# Patient Record
Sex: FEMALE | Race: OTHER | HISPANIC OR LATINO
[De-identification: names, ages, dates, MRNs, and addresses within clinical notes are randomized per-mention and may not be internally consistent; named-entity substitution may affect disease eponyms.]

---

## 2017-06-26 ENCOUNTER — APPOINTMENT (OUTPATIENT)
Dept: MRI IMAGING | Facility: CLINIC | Age: 27
End: 2017-06-26

## 2017-06-26 ENCOUNTER — OUTPATIENT (OUTPATIENT)
Dept: OUTPATIENT SERVICES | Facility: HOSPITAL | Age: 27
LOS: 1 days | End: 2017-06-26
Payer: MEDICAID

## 2017-06-26 DIAGNOSIS — Z00.8 ENCOUNTER FOR OTHER GENERAL EXAMINATION: ICD-10-CM

## 2017-06-26 PROCEDURE — 70551 MRI BRAIN STEM W/O DYE: CPT

## 2018-05-15 ENCOUNTER — RESULT REVIEW (OUTPATIENT)
Age: 28
End: 2018-05-15

## 2018-09-20 ENCOUNTER — APPOINTMENT (OUTPATIENT)
Dept: RHEUMATOLOGY | Facility: CLINIC | Age: 28
End: 2018-09-20
Payer: MEDICAID

## 2018-09-20 VITALS
HEIGHT: 59 IN | SYSTOLIC BLOOD PRESSURE: 120 MMHG | OXYGEN SATURATION: 98 % | TEMPERATURE: 97.8 F | BODY MASS INDEX: 26.21 KG/M2 | HEART RATE: 64 BPM | WEIGHT: 130 LBS | DIASTOLIC BLOOD PRESSURE: 72 MMHG | RESPIRATION RATE: 14 BRPM

## 2018-09-20 DIAGNOSIS — G43.909 MIGRAINE, UNSPECIFIED, NOT INTRACTABLE, W/OUT STATUS MIGRAINOSUS: ICD-10-CM

## 2018-09-20 DIAGNOSIS — Z83.511 FAMILY HISTORY OF GLAUCOMA: ICD-10-CM

## 2018-09-20 PROCEDURE — 99204 OFFICE O/P NEW MOD 45 MIN: CPT

## 2018-09-23 RX ORDER — IBUPROFEN 600 MG/1
600 TABLET, FILM COATED ORAL
Qty: 15 | Refills: 0 | Status: DISCONTINUED | COMMUNITY
Start: 2018-07-27 | End: 2018-09-23

## 2018-09-23 RX ORDER — PREDNISONE 20 MG/1
20 TABLET ORAL
Qty: 8 | Refills: 0 | Status: DISCONTINUED | COMMUNITY
Start: 2018-09-18 | End: 2018-09-23

## 2018-09-23 RX ORDER — CEPHALEXIN 500 MG/1
500 CAPSULE ORAL
Qty: 28 | Refills: 0 | Status: DISCONTINUED | COMMUNITY
Start: 2018-07-12 | End: 2018-09-23

## 2018-09-23 RX ORDER — AMOXICILLIN 500 MG/1
500 CAPSULE ORAL
Qty: 21 | Refills: 0 | Status: DISCONTINUED | COMMUNITY
Start: 2018-07-27 | End: 2018-09-23

## 2018-09-23 RX ORDER — METHYLPREDNISOLONE 4 MG/1
4 TABLET ORAL
Qty: 21 | Refills: 0 | Status: DISCONTINUED | COMMUNITY
Start: 2018-07-23 | End: 2018-09-23

## 2018-09-23 RX ORDER — CLOTRIMAZOLE 10 MG/G
1 CREAM TOPICAL
Qty: 30 | Refills: 0 | Status: DISCONTINUED | COMMUNITY
Start: 2018-07-23 | End: 2018-09-23

## 2018-09-25 LAB
ANA PAT FLD IF-IMP: ABNORMAL
ANA SER IF-ACNC: ABNORMAL
C3 SERPL-MCNC: 64 MG/DL
C4 SERPL-MCNC: 6 MG/DL
CREAT SPEC-SCNC: 104 MG/DL
CREAT/PROT UR: 0.1 RATIO
DSDNA AB SER-ACNC: 306 IU/ML
PROT UR-MCNC: 13 MG/DL

## 2018-10-25 ENCOUNTER — APPOINTMENT (OUTPATIENT)
Dept: RHEUMATOLOGY | Facility: CLINIC | Age: 28
End: 2018-10-25
Payer: MEDICAID

## 2018-10-25 VITALS
DIASTOLIC BLOOD PRESSURE: 72 MMHG | WEIGHT: 137 LBS | HEIGHT: 59 IN | OXYGEN SATURATION: 98 % | HEART RATE: 87 BPM | BODY MASS INDEX: 27.62 KG/M2 | TEMPERATURE: 97.8 F | SYSTOLIC BLOOD PRESSURE: 106 MMHG

## 2018-10-25 LAB
BASOPHILS # BLD AUTO: 0.02 K/UL
BASOPHILS NFR BLD AUTO: 0.2 %
EOSINOPHIL # BLD AUTO: 0.01 K/UL
EOSINOPHIL NFR BLD AUTO: 0.1 %
ERYTHROCYTE [SEDIMENTATION RATE] IN BLOOD BY WESTERGREN METHOD: 7 MM/HR
HCT VFR BLD CALC: 37.1 %
HGB BLD-MCNC: 13.4 G/DL
IMM GRANULOCYTES NFR BLD AUTO: 0.1 %
LYMPHOCYTES # BLD AUTO: 0.92 K/UL
LYMPHOCYTES NFR BLD AUTO: 11.2 %
MAN DIFF?: NORMAL
MCHC RBC-ENTMCNC: 29.8 PG
MCHC RBC-ENTMCNC: 36.1 GM/DL
MCV RBC AUTO: 82.4 FL
MONOCYTES # BLD AUTO: 0.25 K/UL
MONOCYTES NFR BLD AUTO: 3.1 %
NEUTROPHILS # BLD AUTO: 6.97 K/UL
NEUTROPHILS NFR BLD AUTO: 85.3 %
PLATELET # BLD AUTO: 340 K/UL
RBC # BLD: 4.5 M/UL
RBC # FLD: 13.1 %
WBC # FLD AUTO: 8.18 K/UL

## 2018-10-25 PROCEDURE — 99214 OFFICE O/P EST MOD 30 MIN: CPT

## 2018-10-25 RX ORDER — PREDNISONE 20 MG/1
20 TABLET ORAL
Qty: 60 | Refills: 1 | Status: DISCONTINUED | COMMUNITY
Start: 2018-09-20 | End: 2018-10-25

## 2018-10-25 RX ORDER — NAPROXEN 500 MG/1
500 TABLET ORAL
Qty: 20 | Refills: 0 | Status: DISCONTINUED | COMMUNITY
Start: 2018-09-18 | End: 2018-10-25

## 2018-10-26 LAB
ALBUMIN SERPL ELPH-MCNC: 4.7 G/DL
ALP BLD-CCNC: 56 U/L
ALT SERPL-CCNC: 13 U/L
ANION GAP SERPL CALC-SCNC: 14 MMOL/L
AST SERPL-CCNC: 15 U/L
BILIRUB SERPL-MCNC: 0.3 MG/DL
BUN SERPL-MCNC: 9 MG/DL
C3 SERPL-MCNC: 71 MG/DL
C4 SERPL-MCNC: 9 MG/DL
CALCIUM SERPL-MCNC: 9.1 MG/DL
CHLORIDE SERPL-SCNC: 103 MMOL/L
CO2 SERPL-SCNC: 27 MMOL/L
CREAT SERPL-MCNC: 0.68 MG/DL
CRP SERPL-MCNC: <0.1 MG/DL
DSDNA AB SER-ACNC: 119 IU/ML
GLUCOSE SERPL-MCNC: 92 MG/DL
POTASSIUM SERPL-SCNC: 4.3 MMOL/L
PROT SERPL-MCNC: 7.7 G/DL
SODIUM SERPL-SCNC: 144 MMOL/L

## 2018-10-28 LAB
ANA PAT FLD IF-IMP: ABNORMAL
ANA SER IF-ACNC: ABNORMAL

## 2018-11-07 ENCOUNTER — FORM ENCOUNTER (OUTPATIENT)
Age: 28
End: 2018-11-07

## 2018-11-08 ENCOUNTER — APPOINTMENT (OUTPATIENT)
Dept: ULTRASOUND IMAGING | Facility: HOSPITAL | Age: 28
End: 2018-11-08

## 2018-11-08 ENCOUNTER — OUTPATIENT (OUTPATIENT)
Dept: OUTPATIENT SERVICES | Facility: HOSPITAL | Age: 28
LOS: 1 days | End: 2018-11-08
Payer: MEDICAID

## 2018-11-08 DIAGNOSIS — I77.6 ARTERITIS, UNSPECIFIED: ICD-10-CM

## 2018-11-08 PROCEDURE — 93930 UPPER EXTREMITY STUDY: CPT | Mod: 26

## 2018-11-08 PROCEDURE — 93970 EXTREMITY STUDY: CPT

## 2018-11-08 PROCEDURE — 93970 EXTREMITY STUDY: CPT | Mod: 26

## 2018-11-08 PROCEDURE — 93930 UPPER EXTREMITY STUDY: CPT

## 2018-11-09 ENCOUNTER — INPATIENT (INPATIENT)
Facility: HOSPITAL | Age: 28
LOS: 4 days | Discharge: HOME | DRG: 300 | End: 2018-11-14
Attending: HOSPITALIST | Admitting: INTERNAL MEDICINE
Payer: MEDICAID

## 2018-11-09 ENCOUNTER — RX RENEWAL (OUTPATIENT)
Age: 28
End: 2018-11-09

## 2018-11-09 VITALS
HEART RATE: 91 BPM | OXYGEN SATURATION: 98 % | WEIGHT: 134.92 LBS | RESPIRATION RATE: 18 BRPM | SYSTOLIC BLOOD PRESSURE: 109 MMHG | HEIGHT: 59 IN | DIASTOLIC BLOOD PRESSURE: 76 MMHG | TEMPERATURE: 98 F

## 2018-11-09 LAB
ALBUMIN SERPL ELPH-MCNC: 4.6 G/DL — SIGNIFICANT CHANGE UP (ref 3.3–5)
ALP SERPL-CCNC: 60 U/L — SIGNIFICANT CHANGE UP (ref 40–120)
ALT FLD-CCNC: 12 U/L — SIGNIFICANT CHANGE UP (ref 10–45)
ANION GAP SERPL CALC-SCNC: 11 MMOL/L — SIGNIFICANT CHANGE UP (ref 5–17)
APTT BLD: 67.2 SEC — HIGH (ref 27.5–36.3)
AST SERPL-CCNC: 18 U/L — SIGNIFICANT CHANGE UP (ref 10–40)
BASOPHILS # BLD AUTO: 0.1 K/UL — SIGNIFICANT CHANGE UP (ref 0–0.2)
BASOPHILS NFR BLD AUTO: 0.8 % — SIGNIFICANT CHANGE UP (ref 0–2)
BILIRUB SERPL-MCNC: 0.4 MG/DL — SIGNIFICANT CHANGE UP (ref 0.2–1.2)
BLD GP AB SCN SERPL QL: NEGATIVE — SIGNIFICANT CHANGE UP
BUN SERPL-MCNC: 8 MG/DL — SIGNIFICANT CHANGE UP (ref 7–23)
CALCIUM SERPL-MCNC: 9.3 MG/DL — SIGNIFICANT CHANGE UP (ref 8.4–10.5)
CHLORIDE SERPL-SCNC: 100 MMOL/L — SIGNIFICANT CHANGE UP (ref 96–108)
CO2 SERPL-SCNC: 25 MMOL/L — SIGNIFICANT CHANGE UP (ref 22–31)
CREAT SERPL-MCNC: 0.65 MG/DL — SIGNIFICANT CHANGE UP (ref 0.5–1.3)
EOSINOPHIL # BLD AUTO: 0.1 K/UL — SIGNIFICANT CHANGE UP (ref 0–0.5)
EOSINOPHIL NFR BLD AUTO: 0.9 % — SIGNIFICANT CHANGE UP (ref 0–6)
GLUCOSE SERPL-MCNC: 87 MG/DL — SIGNIFICANT CHANGE UP (ref 70–99)
HCT VFR BLD CALC: 40.3 % — SIGNIFICANT CHANGE UP (ref 34.5–45)
HGB BLD-MCNC: 14.1 G/DL — SIGNIFICANT CHANGE UP (ref 11.5–15.5)
INR BLD: 1.2 RATIO — HIGH (ref 0.88–1.16)
LYMPHOCYTES # BLD AUTO: 2.1 K/UL — SIGNIFICANT CHANGE UP (ref 1–3.3)
LYMPHOCYTES # BLD AUTO: 22.8 % — SIGNIFICANT CHANGE UP (ref 13–44)
MCHC RBC-ENTMCNC: 29.8 PG — SIGNIFICANT CHANGE UP (ref 27–34)
MCHC RBC-ENTMCNC: 34.9 GM/DL — SIGNIFICANT CHANGE UP (ref 32–36)
MCV RBC AUTO: 85.3 FL — SIGNIFICANT CHANGE UP (ref 80–100)
MONOCYTES # BLD AUTO: 0.6 K/UL — SIGNIFICANT CHANGE UP (ref 0–0.9)
MONOCYTES NFR BLD AUTO: 6.5 % — SIGNIFICANT CHANGE UP (ref 2–14)
NEUTROPHILS # BLD AUTO: 6.4 K/UL — SIGNIFICANT CHANGE UP (ref 1.8–7.4)
NEUTROPHILS NFR BLD AUTO: 69.1 % — SIGNIFICANT CHANGE UP (ref 43–77)
PLATELET # BLD AUTO: 355 K/UL — SIGNIFICANT CHANGE UP (ref 150–400)
POTASSIUM SERPL-MCNC: 3.8 MMOL/L — SIGNIFICANT CHANGE UP (ref 3.5–5.3)
POTASSIUM SERPL-SCNC: 3.8 MMOL/L — SIGNIFICANT CHANGE UP (ref 3.5–5.3)
PROT SERPL-MCNC: 8.1 G/DL — SIGNIFICANT CHANGE UP (ref 6–8.3)
PROTHROM AB SERPL-ACNC: 13.8 SEC — HIGH (ref 10–12.9)
RBC # BLD: 4.73 M/UL — SIGNIFICANT CHANGE UP (ref 3.8–5.2)
RBC # FLD: 12.6 % — SIGNIFICANT CHANGE UP (ref 10.3–14.5)
RH IG SCN BLD-IMP: POSITIVE — SIGNIFICANT CHANGE UP
SODIUM SERPL-SCNC: 136 MMOL/L — SIGNIFICANT CHANGE UP (ref 135–145)
WBC # BLD: 9.2 K/UL — SIGNIFICANT CHANGE UP (ref 3.8–10.5)
WBC # FLD AUTO: 9.2 K/UL — SIGNIFICANT CHANGE UP (ref 3.8–10.5)

## 2018-11-09 PROCEDURE — 99285 EMERGENCY DEPT VISIT HI MDM: CPT

## 2018-11-09 NOTE — ED PROVIDER NOTE - NS ED ROS FT
See HPI    [ x] All other systems negative  [ ] Unable to assess ROS because ________. paresthesias, Otherwise negative except as per HPI.

## 2018-11-09 NOTE — ED PROVIDER NOTE - PHYSICAL EXAMINATION
General: WN/WD NAD  HEENT: PERRLA, EOMI, moist mucous membranes  Neurology: A&Ox3, nonfocal, AMADO x 4  Respiratory: CTA B/L, normal respiratory effort, no wheezes, crackles, rales  CV: RRR, S1S2, no murmurs, rubs or gallops  Abdominal: Soft, NT, ND +BS, Last BM  Extremities: No edema, + peripheral pulses  Incisions: None  Tubes: None General: WN/WD NAD  HEENT: PERRLA, EOMI, moist mucous membranes  Neurology: A&Ox3, nonfocal, AMADO x 4  Respiratory: CTA B/L, normal respiratory effort, no wheezes, crackles, rales  CV: RRR, S1S2, no murmurs, rubs or gallops  Abdominal: Soft, NT, ND +BS, Last BM  Extremities: No edema, + peripheral pulses, LUE -- L hand c palp radial pulse, symmetric, normal sensation throughout M/R/U, strength 5/5 throughout, no swelling of hand, CR <2 sec distally.  Incisions: None  Tubes: None

## 2018-11-09 NOTE — CONSULT NOTE ADULT - ASSESSMENT
Patient is a 28F with lupus found to have occlusion of right ulnar artery, blood flow to hand intact.  - no acute surgical intervention at this time  - patient can go home on therapeutic AC  - recommend hypercoagulability workup with heme/rheum  - patient seen and examined with fellow Dr. Danay Blunt PGY2  [phone] Patient is a 28F with lupus found to have occlusion of right ulnar artery, blood flow to hand intact.  - no acute surgical intervention at this time  - patient can go home on therapeutic AC, may use warfarin or NOAC as preferred by patient and/or primary team, depending on pricing/availability  - recommend hypercoagulability workup with heme/rheum  - not unreasonable to obtain echo to rule out source of thromboembolism  - patient seen and examined with fellow Dr. Danay Blunt PGY2  [id number]

## 2018-11-09 NOTE — ED PROVIDER NOTE - OBJECTIVE STATEMENT
28F w/ hx of lupus (on plaquinil and prednisone) and migraines presents sent in by rheumatologist after RUE ultrasound showed R ulnar arterial clot in the distal forearm. Pt saw rheum for RUE heaviness and pain. Obtained bilat venous and arterial sono, which showed findings above. No HA, blurry vision, SOB, nausea, abdominal pain, lower extremity paresthesias, fevers, chills. Endorses some mild R chest heaviness in association with the RUE heaviness. Can still move R hand and wrist. Doesn't feel cold. No hx of arterial clots in the past. 2 successful pregnancies, no abortions. No fam hx of early cardiac death. Non smoker (occ smokes marijuana, last use last week). Denies IVDU. No OCPs. LMP one month ago.

## 2018-11-09 NOTE — CONSULT NOTE ADULT - ATTENDING COMMENTS
seen ex;ed dw;ed fellow  As above  R ulnar occ.  No ischia  +rad puklse.  Ulnar pulse nonpalp.  Hand/fingers ok    Rec:   Med/rheum/heme pete and mgmt  Agree w AC

## 2018-11-09 NOTE — ED ADULT TRIAGE NOTE - CHIEF COMPLAINT QUOTE
Right arm weakness for a while, had ultrasound of right arm yesterday, was called today to go to ER for "arterial blockage". Had chest pressure today. Diagnosed with Lupus 3 months ago, on Prednisone and Plaquenil

## 2018-11-09 NOTE — ED ADULT NURSE REASSESSMENT NOTE - NS ED NURSE REASSESS COMMENT FT1
23:10. Report received from NEHA Martin. Pt AAOx4, NAD, resp nonlabored, skin warm/dry, resting comfortably in bed with family at bedside. Pt denies headache, dizziness, chest pain, palpitations, SOB, abd pain, n/v/d, urinary symptoms, fevers, chills, weakness at this time. Safety maintained.

## 2018-11-09 NOTE — CONSULT NOTE ADULT - SUBJECTIVE AND OBJECTIVE BOX
CC: 28y old Female admitted with a chief complaint of arm weakness , now    HPI: Patient is a 28F hx of lupus diagnosed 3 months ago sent in to the ED by her rheumatologist for evaluation of right ulnar artery occlusion.  Patient has had intermittent weakness, pain, and warmth in her right arm for the past 2 months.  As part of workup her rheumatologist sent her for OP dopplers, was found to have occlusion of rt ulnar artery and was sent into ED.  She is currently asymptomatic, no pain, no numbness/tingling, no weakness, no pressure. She is able to use her arm without difficulty.  No fevers, chills, n/v.  No hx of blood clots in past.      PMHx: Migraines  Lupus erythematosus, unspecified form    PSHx: No significant past surgical history      Allergies: No Known Allergies    Family Hx: No pertinent family history in first degree relatives      Physical Exam  T(C): 36.8  HR: 91 (91 - 91)  BP: 109/76 (109/76 - 109/76)  RR: 18 (18 - 18)  SpO2: 98% (98% - 98%)  Tmax: T(C): , Max: 36.8 (11-09-18 @ 18:39)    General: well developed, well nourished, NAD  Neuro: alert and oriented, no focal deficits, moves all extremities spontaneously  HEENT: NCAT, EOMI, anicteric, mucosa moist  Respiratory: airway patent, respirations unlabored  CVS: regular rate and rhythm  Abdomen: soft, nontender, nondistended  Extremities: no edema, sensation and movement grossly intact. Right radial and ulnar pulses intact, hand warm, good cap refill.   Skin: warm, dry, appropriate color    Labs:                        14.1   9.2   )-----------( 355      ( 09 Nov 2018 20:45 )             40.3     PT/INR - ( 09 Nov 2018 20:45 )   PT: 13.8 sec;   INR: 1.20 ratio         PTT - ( 09 Nov 2018 20:45 )  PTT:67.2 sec  11-09    136  |  100  |  8   ----------------------------<  87  3.8   |  25  |  0.65    Ca    9.3      09 Nov 2018 20:45    TPro  8.1  /  Alb  4.6  /  TBili  0.4  /  DBili  x   /  AST  18  /  ALT  12  /  AlkPhos  60  11-09            Imaging and other studies:  < from: [facility] Duplex Upper Extrem Arterial, Bilat (11.08.18 @ 10:51) >  Impression:    Near-total occlusion of the mid to distal right ulnar artery .    < end of copied text >

## 2018-11-10 ENCOUNTER — TRANSCRIPTION ENCOUNTER (OUTPATIENT)
Age: 28
End: 2018-11-10

## 2018-11-10 DIAGNOSIS — I74.2 EMBOLISM AND THROMBOSIS OF ARTERIES OF THE UPPER EXTREMITIES: ICD-10-CM

## 2018-11-10 DIAGNOSIS — M32.9 SYSTEMIC LUPUS ERYTHEMATOSUS, UNSPECIFIED: ICD-10-CM

## 2018-11-10 DIAGNOSIS — Z29.9 ENCOUNTER FOR PROPHYLACTIC MEASURES, UNSPECIFIED: ICD-10-CM

## 2018-11-10 LAB
APPEARANCE UR: CLEAR — SIGNIFICANT CHANGE UP
APTT BLD: 118.3 SEC — HIGH (ref 27.5–36.3)
APTT BLD: 75.4 SEC — HIGH (ref 27.5–36.3)
BACTERIA # UR AUTO: ABNORMAL
BILIRUB UR-MCNC: NEGATIVE — SIGNIFICANT CHANGE UP
COLOR SPEC: YELLOW — SIGNIFICANT CHANGE UP
CREAT ?TM UR-MCNC: 74 MG/DL — SIGNIFICANT CHANGE UP
DIFF PNL FLD: NEGATIVE — SIGNIFICANT CHANGE UP
EPI CELLS # UR: 4 /HPF — SIGNIFICANT CHANGE UP
GLUCOSE UR QL: NEGATIVE — SIGNIFICANT CHANGE UP
HCT VFR BLD CALC: 37.7 % — SIGNIFICANT CHANGE UP (ref 34.5–45)
HCT VFR BLD CALC: 40.5 % — SIGNIFICANT CHANGE UP (ref 34.5–45)
HGB BLD-MCNC: 13.7 G/DL — SIGNIFICANT CHANGE UP (ref 11.5–15.5)
HGB BLD-MCNC: 13.7 G/DL — SIGNIFICANT CHANGE UP (ref 11.5–15.5)
HYALINE CASTS # UR AUTO: 1 /LPF — SIGNIFICANT CHANGE UP (ref 0–2)
INR BLD: 1.31 RATIO — HIGH (ref 0.88–1.16)
KETONES UR-MCNC: NEGATIVE — SIGNIFICANT CHANGE UP
LEUKOCYTE ESTERASE UR-ACNC: ABNORMAL
MCHC RBC-ENTMCNC: 28.8 PG — SIGNIFICANT CHANGE UP (ref 27–34)
MCHC RBC-ENTMCNC: 30.8 PG — SIGNIFICANT CHANGE UP (ref 27–34)
MCHC RBC-ENTMCNC: 33.9 GM/DL — SIGNIFICANT CHANGE UP (ref 32–36)
MCHC RBC-ENTMCNC: 36.4 GM/DL — HIGH (ref 32–36)
MCV RBC AUTO: 84.7 FL — SIGNIFICANT CHANGE UP (ref 80–100)
MCV RBC AUTO: 85.2 FL — SIGNIFICANT CHANGE UP (ref 80–100)
NITRITE UR-MCNC: NEGATIVE — SIGNIFICANT CHANGE UP
PH UR: 7 — SIGNIFICANT CHANGE UP (ref 5–8)
PLATELET # BLD AUTO: 318 K/UL — SIGNIFICANT CHANGE UP (ref 150–400)
PLATELET # BLD AUTO: 336 K/UL — SIGNIFICANT CHANGE UP (ref 150–400)
PROT ?TM UR-MCNC: 10 MG/DL — SIGNIFICANT CHANGE UP (ref 0–12)
PROT UR-MCNC: ABNORMAL
PROT/CREAT UR-RTO: 0.1 RATIO — SIGNIFICANT CHANGE UP (ref 0–0.2)
PROTHROM AB SERPL-ACNC: 15.2 SEC — HIGH (ref 10–12.9)
RBC # BLD: 4.44 M/UL — SIGNIFICANT CHANGE UP (ref 3.8–5.2)
RBC # BLD: 4.76 M/UL — SIGNIFICANT CHANGE UP (ref 3.8–5.2)
RBC # FLD: 12.4 % — SIGNIFICANT CHANGE UP (ref 10.3–14.5)
RBC # FLD: 12.4 % — SIGNIFICANT CHANGE UP (ref 10.3–14.5)
RBC CASTS # UR COMP ASSIST: 1 /HPF — SIGNIFICANT CHANGE UP (ref 0–4)
SP GR SPEC: 1.01 — SIGNIFICANT CHANGE UP (ref 1.01–1.02)
UROBILINOGEN FLD QL: NEGATIVE — SIGNIFICANT CHANGE UP
WBC # BLD: 12.1 K/UL — HIGH (ref 3.8–10.5)
WBC # BLD: 9.4 K/UL — SIGNIFICANT CHANGE UP (ref 3.8–10.5)
WBC # FLD AUTO: 12.1 K/UL — HIGH (ref 3.8–10.5)
WBC # FLD AUTO: 9.4 K/UL — SIGNIFICANT CHANGE UP (ref 3.8–10.5)
WBC UR QL: 10 /HPF — HIGH (ref 0–5)

## 2018-11-10 PROCEDURE — 99223 1ST HOSP IP/OBS HIGH 75: CPT | Mod: GC

## 2018-11-10 PROCEDURE — 12345: CPT | Mod: NC,GC

## 2018-11-10 RX ORDER — FONDAPARINUX SODIUM 2.5 MG/.5ML
1 INJECTION, SOLUTION SUBCUTANEOUS
Qty: 60 | Refills: 0 | OUTPATIENT
Start: 2018-11-10 | End: 2018-12-09

## 2018-11-10 RX ORDER — WARFARIN SODIUM 2.5 MG/1
1 TABLET ORAL
Qty: 30 | Refills: 0 | OUTPATIENT
Start: 2018-11-10 | End: 2018-12-09

## 2018-11-10 RX ORDER — ENOXAPARIN SODIUM 100 MG/ML
50 INJECTION SUBCUTANEOUS
Qty: 1500 | Refills: 0 | OUTPATIENT
Start: 2018-11-10 | End: 2018-12-09

## 2018-11-10 RX ORDER — HYDROXYCHLOROQUINE SULFATE 200 MG
1 TABLET ORAL
Qty: 0 | Refills: 0 | COMMUNITY

## 2018-11-10 RX ORDER — WARFARIN SODIUM 2.5 MG/1
5 TABLET ORAL ONCE
Qty: 0 | Refills: 0 | Status: COMPLETED | OUTPATIENT
Start: 2018-11-10 | End: 2018-11-10

## 2018-11-10 RX ORDER — HEPARIN SODIUM 5000 [USP'U]/ML
2500 INJECTION INTRAVENOUS; SUBCUTANEOUS EVERY 6 HOURS
Qty: 0 | Refills: 0 | Status: DISCONTINUED | OUTPATIENT
Start: 2018-11-10 | End: 2018-11-14

## 2018-11-10 RX ORDER — HEPARIN SODIUM 5000 [USP'U]/ML
5000 INJECTION INTRAVENOUS; SUBCUTANEOUS EVERY 6 HOURS
Qty: 0 | Refills: 0 | Status: DISCONTINUED | OUTPATIENT
Start: 2018-11-10 | End: 2018-11-14

## 2018-11-10 RX ORDER — HEPARIN SODIUM 5000 [USP'U]/ML
INJECTION INTRAVENOUS; SUBCUTANEOUS
Qty: 25000 | Refills: 0 | Status: DISCONTINUED | OUTPATIENT
Start: 2018-11-10 | End: 2018-11-10

## 2018-11-10 RX ORDER — HEPARIN SODIUM 5000 [USP'U]/ML
INJECTION INTRAVENOUS; SUBCUTANEOUS
Qty: 25000 | Refills: 0 | Status: DISCONTINUED | OUTPATIENT
Start: 2018-11-10 | End: 2018-11-14

## 2018-11-10 RX ORDER — ASPIRIN/CALCIUM CARB/MAGNESIUM 324 MG
81 TABLET ORAL DAILY
Qty: 0 | Refills: 0 | Status: DISCONTINUED | OUTPATIENT
Start: 2018-11-10 | End: 2018-11-14

## 2018-11-10 RX ORDER — HYDROXYCHLOROQUINE SULFATE 200 MG
200 TABLET ORAL
Qty: 0 | Refills: 0 | Status: DISCONTINUED | OUTPATIENT
Start: 2018-11-10 | End: 2018-11-14

## 2018-11-10 RX ORDER — APIXABAN 2.5 MG/1
2 TABLET, FILM COATED ORAL
Qty: 120 | Refills: 0 | OUTPATIENT
Start: 2018-11-10 | End: 2018-12-09

## 2018-11-10 RX ADMIN — Medication 200 MILLIGRAM(S): at 17:51

## 2018-11-10 RX ADMIN — HEPARIN SODIUM 1100 UNIT(S)/HR: 5000 INJECTION INTRAVENOUS; SUBCUTANEOUS at 14:03

## 2018-11-10 RX ADMIN — HEPARIN SODIUM 1100 UNIT(S)/HR: 5000 INJECTION INTRAVENOUS; SUBCUTANEOUS at 01:37

## 2018-11-10 RX ADMIN — Medication 200 MILLIGRAM(S): at 07:48

## 2018-11-10 RX ADMIN — WARFARIN SODIUM 5 MILLIGRAM(S): 2.5 TABLET ORAL at 22:26

## 2018-11-10 RX ADMIN — Medication 81 MILLIGRAM(S): at 11:55

## 2018-11-10 RX ADMIN — WARFARIN SODIUM 5 MILLIGRAM(S): 2.5 TABLET ORAL at 01:38

## 2018-11-10 RX ADMIN — Medication 5 MILLIGRAM(S): at 07:18

## 2018-11-10 RX ADMIN — HEPARIN SODIUM 1000 UNIT(S)/HR: 5000 INJECTION INTRAVENOUS; SUBCUTANEOUS at 21:49

## 2018-11-10 NOTE — H&P ADULT - PROBLEM SELECTOR PLAN 1
- Evaluated by Vascular surgery- No surgical intervention at this time  - Blood flow intact to hand  - Hypercoaguable work up outpt by Rheum/Heme  - c/w Hep gtt and d/c on therapeutic a/c - Evaluated by Vascular surgery- No surgical intervention at this time  - Blood flow intact to hand. Palpable pulses. c/w ASA  - Hypercoaguable work up outpt by Rheum/Heme  - c/w Hep gtt and d/c on therapeutic a/c - Evaluated by Vascular surgery- No surgical intervention at this time  - f/u vascular recs. likely no need for TTE.  - right hand perfusing well.   - will r/o APLS for now. otherwise f/u outpt Rheum/Heme for hypercoag workup  - c/w ASA  - c/w Hep gtt and d/c on therapeutic a/c  - likely avoid coumadin given risk of birth defects. confirm insurance coverage. - Evaluated by Vascular surgery- No surgical intervention at this time  - f/u vascular recs.  - spoke with vasc surgery, they will f/u with further recs. likely no need for TTE.  - right hand perfusing well.   - will r/o APLS for now. otherwise f/u outpt Rheum/Heme for hypercoag workup  - c/w ASA  - c/w Hep gtt and d/c on therapeutic a/c  - consider avoiding coumadin given risk of birth defects. confirm insurance coverage.

## 2018-11-10 NOTE — PROGRESS NOTE ADULT - PROBLEM SELECTOR PLAN 1
- Evaluated by Vascular surgery- No surgical intervention at this time  - f/u vascular recs.  - spoke with vasc surgery, they will f/u with further recs. likely no need for TTE.  - right hand perfusing well.   - will r/o APLS for now. otherwise f/u outpt Rheum/Heme for hypercoag workup  - c/w ASA  - c/w Hep gtt and d/c on therapeutic a/c  - consider avoiding coumadin given risk of birth defects. confirm insurance coverage. - Evaluated by Vascular surgery- No surgical intervention at this time  - f/u vascular recs.  - right hand perfusing well.   - will r/o APLS for now. otherwise f/u outpt Rheum/Heme for hypercoag workup  - will order echo to rule out cardiac thrombi  - c/w ASA  - c/w Hep gtt and d/c on therapeutic a/c  - consider avoiding coumadin given risk of birth defects. confirm insurance coverage. - Evaluated by Vascular surgery- No surgical intervention at this time  - f/u vascular recs.  - right hand perfusing well.   - will r/o APLS for now. otherwise f/u outpt Rheum/Heme for hypercoag workup  - if APLS negative, can consider echo for cardiac thrombi  - c/w ASA  - c/w Hep gtt and d/c on therapeutic a/c  - consider avoiding coumadin given risk of birth defects. confirm insurance coverage.

## 2018-11-10 NOTE — PROGRESS NOTE ADULT - SUBJECTIVE AND OBJECTIVE BOX
Azam Segovia  [id number]    CHIEF COMPLAINT: right arm pain    Interval Events:    No pain or numbness in her right arm.  No chest pain, shortness of breath.    REVIEW OF SYSTEMS:    CONSTITUTIONAL: No weakness, fevers or chills  EYES/ENT: No visual changes;  No vertigo or throat pain   NECK: No pain or stiffness  RESPIRATORY: No cough, wheezing, hemoptysis; No shortness of breath  CARDIOVASCULAR: No chest pain or palpitations  GASTROINTESTINAL: No abdominal or epigastric pain. No nausea, vomiting, or hematemesis; No diarrhea or constipation. No melena or hematochezia.  GENITOURINARY: No dysuria, frequency or hematuria  NEUROLOGICAL: No numbness or weakness  SKIN: No itching, rashes      OBJECTIVE:  ICU Vital Signs Last 24 Hrs  T(C): 36.7 (10 Nov 2018 04:59), Max: 36.9 (10 Nov 2018 03:11)  T(F): 98.1 (10 Nov 2018 04:59), Max: 98.5 (10 Nov 2018 03:11)  HR: 68 (10 Nov 2018 04:59) (68 - 91)  BP: 100/69 (10 Nov 2018 04:59) (100/69 - 109/76)  BP(mean): --  ABP: --  ABP(mean): --  RR: 16 (10 Nov 2018 04:59) (16 - 18)  SpO2: 100% (10 Nov 2018 04:59) (98% - 100%)        CAPILLARY BLOOD GLUCOSE    PHYSICAL EXAM:  GENERAL: NAD, well-developed  HEAD:  Atraumatic, Normocephalic  EYES: EOMI, PERRLA, conjunctiva and sclera clear  NECK: Supple, No JVD  CHEST/LUNG: Clear to auscultation bilaterally; No wheeze  HEART: Regular rate and rhythm; No murmurs, rubs, or gallops  ABDOMEN: Soft, Nontender, Nondistended; Bowel sounds present  EXTREMITIES:  2+ radial pulses b/l, lower extremities warm  PSYCH: AAOx3  NEUROLOGY: non-focal  SKIN: No rashes or lesions    LINES:    HOSPITAL MEDICATIONS:  aspirin  chewable 81 milliGRAM(s) Oral daily  heparin  Infusion.  Unit(s)/Hr IV Continuous <Continuous>    hydroxychloroquine 200 milliGRAM(s) Oral two times a day      predniSONE   Tablet 5 milliGRAM(s) Oral daily                        LABS:                        14.1   9.2   )-----------( 355      ( 09 Nov 2018 20:45 )             40.3     Hgb Trend: 14.1<--  11-09    136  |  100  |  8   ----------------------------<  87  3.8   |  25  |  0.65    Ca    9.3      09 Nov 2018 20:45    TPro  8.1  /  Alb  4.6  /  TBili  0.4  /  DBili  x   /  AST  18  /  ALT  12  /  AlkPhos  60  11-09    Creatinine Trend: 0.65<--  PT/INR - ( 09 Nov 2018 20:45 )   PT: 13.8 sec;   INR: 1.20 ratio         PTT - ( 09 Nov 2018 20:45 )  PTT:67.2 sec          MICROBIOLOGY:     RADIOLOGY:  [ ] Reviewed and interpreted by me    EKG:

## 2018-11-10 NOTE — DISCHARGE NOTE ADULT - MEDICATION SUMMARY - MEDICATIONS TO TAKE
I will START or STAY ON the medications listed below when I get home from the hospital:    predniSONE 5 mg oral tablet  -- 1 tab(s) by mouth once a day  -- Indication: For Lupus erythematosus, unspecified form    aspirin 81 mg oral tablet  -- 1 tab(s) by mouth once a day  -- Indication: For Lupus erythematosus, unspecified form    warfarin 4 mg oral tablet  -- 1 tab(s) by mouth once a day (at bedtime) MDD:Take a total of warfarin 6.5mg each night  -- Do not take this drug if you are pregnant.  It is very important that you take or use this exactly as directed.  Do not skip doses or discontinue unless directed by your doctor.  Obtain medical advice before taking any non-prescription drugs as some may affect the action of this medication.    -- Indication: For Right ulnar artery clot    warfarin 2.5 mg oral tablet  -- 1 tab(s) by mouth once a day (at bedtime) MDD:Take a total of warfarin 6.5mg each night  -- Do not take this drug if you are pregnant.  It is very important that you take or use this exactly as directed.  Do not skip doses or discontinue unless directed by your doctor.  Obtain medical advice before taking any non-prescription drugs as some may affect the action of this medication.    -- Indication: For Right ulnar artery clot    hydroxychloroquine 200 mg oral tablet  -- 1 tab(s) by mouth 2 times a day  -- Indication: For Lupus erythematosus, unspecified form

## 2018-11-10 NOTE — H&P ADULT - NSHPREVIEWOFSYSTEMS_GEN_ALL_CORE
REVIEW OF SYSTEMS:    CONSTITUTIONAL: Denies any fatigue, weakness, fevers or chills  EYES/ENT: No visual changes;  No vertigo or throat pain   NECK: No pain or stiffness  RESPIRATORY: No cough, wheezing, hemoptysis; No shortness of breath  CARDIOVASCULAR: No chest pain or palpitations  GASTROINTESTINAL: No abdominal or epigastric pain. No nausea, vomiting, or hematemesis; No diarrhea or constipation. No melena or hematochezia.  GENITOURINARY: No dysuria, frequency or hematuria  NEUROLOGICAL: No numbness or weakness  SKIN: No itching, burning, rashes, or lesions   All other review of systems is negative unless indicated above. REVIEW OF SYSTEMS:    CONSTITUTIONAL: Denies any fatigue, weakness, fevers or chills  EYES/ENT: Reports a transient episode of b/l blurry vision yesterday while driving, but no visual field defects;  No vertigo or throat pain   NECK: No pain or stiffness  RESPIRATORY: No cough, wheezing, hemoptysis; No shortness of breath  CARDIOVASCULAR: No chest pain or palpitations  GASTROINTESTINAL: No abdominal or epigastric pain. No nausea, vomiting, or hematemesis; No diarrhea or constipation. No melena or hematochezia.  GENITOURINARY: No dysuria, frequency or hematuria  NEUROLOGICAL: No numbness or weakness  SKIN: No itching, rashes, or lesions. +burning in right hand and b/l LE  psych: no depression or anxiety  All other review of systems is negative unless indicated above.

## 2018-11-10 NOTE — DISCHARGE NOTE ADULT - PLAN OF CARE
Ongoing management You came to the hospital with an obstruction of an artery in your arm.  The vascular surgeons did not think you needed surgery.  You will need to take... Your lupus may have been underlying this.  Please continue to take your home medications.  Please also follow with your rheumatologist to discuss this hospital stay. You came to the hospital with an obstruction of an artery in your arm.  The vascular surgeons did not think you needed surgery.  You were started on an IV blood thinner called heparin and were then changed to a medication called warfarin. You will continue taking warfarin once you leave the hospital. This medication requires you to measure your blood levels called the INR which has to be between 2-3. Please continue to take your home medications for management of lupus.  In addition, imaging was done of your chest and neck and no clots were seen. We also ordered some labs to assess for a clotting disorder and you can follow up with these results as an outpatient with your rheumatologist. You came to the hospital with an obstruction of an artery in your arm.  The vascular surgeons did not think you needed surgery.  You were started on an IV blood thinner called heparin and were then changed to a medication called warfarin. You will continue taking warfarin once you leave the hospital. This medication requires you to measure your blood levels called the INR which has to be between 2.5-3.5. Please continue to take your home medications for management of lupus. In addition, imaging was done of your chest and neck and no clots were seen. We also ordered some labs to assess for a clotting disorder and you can follow up with these results as an outpatient with your rheumatologist. You came to the hospital with an obstruction of an artery in your arm.  The vascular surgeons did not think you needed surgery.  You were started on an IV blood thinner called heparin and were then changed to a medication called warfarin. You will continue taking warfarin once you leave the hospital. This medication requires you to measure your blood levels called the INR which has to be between 2.5-3.5. You will need to follow up with your rheumatologist in order to check the INR levels. Please continue to take your home medications for management of lupus. In addition, imaging was done of your chest and neck and no clots were seen. Management We ordered various labs in order to assess whether or not you had a clotting disorder that is common in patient with lupus. You will follow up with your rheumatologist and they will review these labs with you. 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 You need to take longterm blood thinners such as coumadin to prevent clots.

## 2018-11-10 NOTE — H&P ADULT - NSHPLABSRESULTS_GEN_ALL_CORE
14.1   9.2   )-----------( 355      ( 09 Nov 2018 20:45 )             40.3       11-09    136  |  100  |  8   ----------------------------<  87  3.8   |  25  |  0.65    Ca    9.3      09 Nov 2018 20:45    TPro  8.1  /  Alb  4.6  /  TBili  0.4  /  DBili  x   /  AST  18  /  ALT  12  /  AlkPhos  60  11-09                  PT/INR - ( 09 Nov 2018 20:45 )   PT: 13.8 sec;   INR: 1.20 ratio         PTT - ( 09 Nov 2018 20:45 )  PTT:67.2 sec    Lactate Trend            CAPILLARY BLOOD GLUCOSE Personally reviewed labs, imaging.    14.1   9.2   )-----------( 355      ( 09 Nov 2018 20:45 )             40.3       11-09    136  |  100  |  8   ----------------------------<  87  3.8   |  25  |  0.65    Ca    9.3      09 Nov 2018 20:45    TPro  8.1  /  Alb  4.6  /  TBili  0.4  /  DBili  x   /  AST  18  /  ALT  12  /  AlkPhos  60  11-09                  PT/INR - ( 09 Nov 2018 20:45 )   PT: 13.8 sec;   INR: 1.20 ratio         PTT - ( 09 Nov 2018 20:45 )  PTT:67.2 sec    Lactate Trend            CAPILLARY BLOOD GLUCOSE

## 2018-11-10 NOTE — CONSULT NOTE ADULT - ASSESSMENT
28 Avery, recently found to have  +DAMON, high DsDNA and hypocomplementemia done as work up of erythematous spots palms/soles, now with a near complete occlusion of  right distal artery   Discussed with radiology no evidence of thrombus, absent blood flow distal to the ulnar artery. Limited role for further imaging such as CTA 28 Avery, recently found to have  +DAMON, high DsDNA and hypocomplementemia done as work up of erythematous spots palms/soles, now with a near complete occlusion of  right distal artery   Discussed with radiology no evidence of thrombus on the US, absent blood flow distal to the ulnar artery. Limited role for CTA of the UE     Recommend  -agree with anticoagulation  -check aPL labs ( LA, anticardiolipin, anti-beta2 glycoprotein)  If patient turns out to have APS, would recommend long-term treatment with coumadin rather than DOACs  -check cryoglobulins   -would recommend imaging of other vessel beds to ensure patency ( neck/chest)     Patient aware of the plan    Maya Winter MD   Rheumatology Fellow  Pager: [phone]

## 2018-11-10 NOTE — DISCHARGE NOTE ADULT - PATIENT PORTAL LINK FT
You can access the [facility] Patient Portal, offered by [facility], by registering with the following website: [email or web address]

## 2018-11-10 NOTE — CONSULT NOTE ADULT - SUBJECTIVE AND OBJECTIVE BOX
DANILO RUSHING  [id number]    HISTORY OF PRESENT ILLNESS:      Serology  DAMON +1:640   DsDNA > 300  C3   C4   Sm, RNP, SSA/SSB negative    Meds  prednisone 6mg  HCQ       PAST MEDICAL & SURGICAL HISTORY:  Migraines  Lupus erythematosus, unspecified form  No significant past surgical history      Review of Systems:  Gen:  No fevers/chills, weight loss  HEENT: No recurrent episodes of URTI, sinusitis, otitis, scleritis. No oral ulcers   CVS: No chest pain/palpitations  Resp: No SOB/wheezing  GI: No N/V/C/D/abdominal pain  MSK: no history of joint pain or swelling   Skin: No rashes  No Raynaud's   No history of miscarriages     MEDICATIONS  (STANDING):  aspirin  chewable 81 milliGRAM(s) Oral daily  heparin  Infusion.  Unit(s)/Hr (11 mL/Hr) IV Continuous <Continuous>  hydroxychloroquine 200 milliGRAM(s) Oral two times a day  predniSONE   Tablet 5 milliGRAM(s) Oral daily    MEDICATIONS  (PRN):  heparin  Injectable 5000 Unit(s) IV Push every 6 hours PRN For aPTT less than 40  heparin  Injectable 2500 Unit(s) IV Push every 6 hours PRN For aPTT between 40 - 57      Allergies    No Known Allergies    Intolerances        PERTINENT MEDICATION HISTORY:    SOCIAL HISTORY:  OCCUPATION:  TRAVEL HISTORY:    FAMILY HISTORY:  No pertinent family history in first degree relatives: No family history of lupus      Vital Signs Last 24 Hrs  T(C): 36.7 (10 Nov 2018 04:59), Max: 36.9 (10 Nov 2018 03:11)  T(F): 98.1 (10 Nov 2018 04:59), Max: 98.5 (10 Nov 2018 03:11)  HR: 68 (10 Nov 2018 04:59) (68 - 91)  BP: 100/69 (10 Nov 2018 04:59) (100/69 - 109/76)  BP(mean): --  RR: 16 (10 Nov 2018 04:59) (16 - 18)  SpO2: 100% (10 Nov 2018 04:59) (98% - 100%)    Daily Height in cm: 149.86 (10 Nov 2018 04:59)    Daily     Physical Exam:  General: No apparent distress  HEENT: EOMI, MMM  CVS: +S1/S2, RRR  Resp: CTA b/l  GI: Soft, NT/ND  MSK:    Neuro: AAOx3  muscle strength RUE LUE ; RLE LLE   Skin: no  rashes    LABS:                        14.1   9.2   )-----------( 355      ( 09 Nov 2018 20:45 )             40.3     11-09    136  |  100  |  8   ----------------------------<  87  3.8   |  25  |  0.65    Ca    9.3      09 Nov 2018 20:45    TPro  8.1  /  Alb  4.6  /  TBili  0.4  /  DBili  x   /  AST  18  /  ALT  12  /  AlkPhos  60  11-09    PT/INR - ( 09 Nov 2018 20:45 )   PT: 13.8 sec;   INR: 1.20 ratio    PTT - ( 09 Nov 2018 20:45 )  PTT:67.2 sec    RADIOLOGY & ADDITIONAL STUDIES:  VA Duplex Upper Extrem Arterial, Bilat (11.08.18 @ 10:51) >  Both subclavian, axillary and brachial arteries are patent.. The right   radial, left radial and left ulnar arteries are also patent.    There is near total occlusion of the mid to distal right ulnar artery.   Beginning in the proximal right ulnar artery and extending to the level   of the distal ulnar artery there is marked reduction in blood flow   velocities with tardus parvus waveforms being demonstrated at the level   of the right distal ulnar artery at the wrist level. DANILO RUSHING  [id number]    HISTORY OF PRESENT ILLNESS:    28 Avery, recently diagnosed with SLE, was seen by Dianne Álvarez NP at the Rheumatology office and was sent in to the ER with the finding of occlusion of the ulnar artery   Patient reports that over the summer she started developing "burning sensation" of palms and sole and was seen several times at [place] diagnosed with coxsackie  Because of persistent symptoms the physician at the office where she work ordered serology and was consistent with +DAMON, DsDNA ( see below)  Patient was seen by Henri CARDONA and an US was ordered since patient was complaining of right arm pain and numbness    Of note, patient has no alopecia, rashes, arthralgias/ arthritis, no hx of clots, no miscarriages   no known family hx of thrombotic diseases or auto-immune disease        Serology  DAMON +1:640   DsDNA > 300  C3   C4   Sm, RNP, SSA/SSB negative    Meds  prednisone 6mg  HCQ       PAST MEDICAL & SURGICAL HISTORY:  Migraines  Lupus erythematosus, unspecified form  No significant past surgical history      Review of Systems:  as above     MEDICATIONS  (STANDING):  aspirin  chewable 81 milliGRAM(s) Oral daily  heparin  Infusion.  Unit(s)/Hr (11 mL/Hr) IV Continuous <Continuous>  hydroxychloroquine 200 milliGRAM(s) Oral two times a day  predniSONE   Tablet 5 milliGRAM(s) Oral daily    MEDICATIONS  (PRN):  heparin  Injectable 5000 Unit(s) IV Push every 6 hours PRN For aPTT less than 40  heparin  Injectable 2500 Unit(s) IV Push every 6 hours PRN For aPTT between 40 - 57      Allergies    No Known Allergies    Intolerances        PERTINENT MEDICATION HISTORY: none     SOCIAL HISTORY: used to smoke few cig/day, currently stopped   OCCUPATION: MA   TRAVEL HISTORY: none     FAMILY HISTORY:  No pertinent family history in first degree relatives: No family history of auto-immune disease     Vital Signs Last 24 Hrs  T(C): 36.7 (10 Nov 2018 04:59), Max: 36.9 (10 Nov 2018 03:11)  T(F): 98.1 (10 Nov 2018 04:59), Max: 98.5 (10 Nov 2018 03:11)  HR: 68 (10 Nov 2018 04:59) (68 - 91)  BP: 100/69 (10 Nov 2018 04:59) (100/69 - 109/76)  BP(mean): --  RR: 16 (10 Nov 2018 04:59) (16 - 18)  SpO2: 100% (10 Nov 2018 04:59) (98% - 100%)    Daily Height in cm: 149.86 (10 Nov 2018 04:59)    Daily     Physical Exam:  General: No apparent distress  HEENT: EOMI, MMM  CVS: +S1/S2, RRR  Resp: CTA b/l  GI: Soft, NT/ND  MSK:  no synovitis, no tenderness   Neuro: AAOx3  muscle strength 5/5  RUE LUE ; RLE LLE   Skin: one small erythematous blanching spot over right palm     LABS:                        14.1   9.2   )-----------( 355      ( 09 Nov 2018 20:45 )             40.3     11-09    136  |  100  |  8   ----------------------------<  87  3.8   |  25  |  0.65    Ca    9.3      09 Nov 2018 20:45    TPro  8.1  /  Alb  4.6  /  TBili  0.4  /  DBili  x   /  AST  18  /  ALT  12  /  AlkPhos  60  11-09    PT/INR - ( 09 Nov 2018 20:45 )   PT: 13.8 sec;   INR: 1.20 ratio    PTT - ( 09 Nov 2018 20:45 )  PTT:67.2 sec    RADIOLOGY & ADDITIONAL STUDIES:  VA Duplex Upper Extrem Arterial, Bilat (11.08.18 @ 10:51) >  Both subclavian, axillary and brachial arteries are patent.. The right   radial, left radial and left ulnar arteries are also patent.    There is near total occlusion of the mid to distal right ulnar artery.   Beginning in the proximal right ulnar artery and extending to the level   of the distal ulnar artery there is marked reduction in blood flow   velocities with tardus parvus waveforms being demonstrated at the level   of the right distal ulnar artery at the wrist level.

## 2018-11-10 NOTE — DISCHARGE NOTE ADULT - CARE PLAN
Principal Discharge DX:	Ulnar artery obstruction, right  Goal:	Ongoing management  Assessment and plan of treatment:	You came to the hospital with an obstruction of an artery in your arm.  The vascular surgeons did not think you needed surgery.  You will need to take...  Secondary Diagnosis:	Lupus erythematosus, unspecified form  Goal:	Ongoing management  Assessment and plan of treatment:	Your lupus may have been underlying this.  Please continue to take your home medications.  Please also follow with your rheumatologist to discuss this hospital stay. Principal Discharge DX:	Ulnar artery obstruction, right  Goal:	Ongoing management  Assessment and plan of treatment:	You came to the hospital with an obstruction of an artery in your arm.  The vascular surgeons did not think you needed surgery.  You were started on an IV blood thinner called heparin and were then changed to a medication called warfarin. You will continue taking warfarin once you leave the hospital. This medication requires you to measure your blood levels called the INR which has to be between 2-3.  Secondary Diagnosis:	Lupus erythematosus, unspecified form  Goal:	Ongoing management  Assessment and plan of treatment:	Please continue to take your home medications for management of lupus.  In addition, imaging was done of your chest and neck and no clots were seen. We also ordered some labs to assess for a clotting disorder and you can follow up with these results as an outpatient with your rheumatologist. Principal Discharge DX:	Ulnar artery obstruction, right  Goal:	Ongoing management  Assessment and plan of treatment:	You came to the hospital with an obstruction of an artery in your arm.  The vascular surgeons did not think you needed surgery.  You were started on an IV blood thinner called heparin and were then changed to a medication called warfarin. You will continue taking warfarin once you leave the hospital. This medication requires you to measure your blood levels called the INR which has to be between 2.5-3.5.  Secondary Diagnosis:	Lupus erythematosus, unspecified form  Goal:	Ongoing management  Assessment and plan of treatment:	Please continue to take your home medications for management of lupus. In addition, imaging was done of your chest and neck and no clots were seen. We also ordered some labs to assess for a clotting disorder and you can follow up with these results as an outpatient with your rheumatologist. Principal Discharge DX:	Ulnar artery obstruction, right  Goal:	Ongoing management  Assessment and plan of treatment:	You came to the hospital with an obstruction of an artery in your arm.  The vascular surgeons did not think you needed surgery.  You were started on an IV blood thinner called heparin and were then changed to a medication called warfarin. You will continue taking warfarin once you leave the hospital. This medication requires you to measure your blood levels called the INR which has to be between 2.5-3.5. You will need to follow up with your rheumatologist in order to check the INR levels.  Secondary Diagnosis:	Lupus erythematosus, unspecified form  Goal:	Ongoing management  Assessment and plan of treatment:	Please continue to take your home medications for management of lupus. In addition, imaging was done of your chest and neck and no clots were seen.  Secondary Diagnosis:	Anti-phospholipid antibody syndrome  Goal:	Management  Assessment and plan of treatment:	We ordered various labs in order to assess whether or not you had a clotting disorder that is common in patient with lupus. You will follow up with your rheumatologist and they will review these labs with you. Principal Discharge DX:	Ulnar artery obstruction, right  Goal:	Ongoing management  Assessment and plan of treatment:	You came to the hospital with an obstruction of an artery in your arm.  The vascular surgeons did not think you needed surgery.  You were started on an IV blood thinner called heparin and were then changed to a medication called warfarin. You will continue taking warfarin once you leave the hospital. This medication requires you to measure your blood levels called the INR which has to be between 2.5-3.5. You will need to follow up with your rheumatologist in order to check the INR levels.  Secondary Diagnosis:	Lupus erythematosus, unspecified form  Goal:	Ongoing management  Assessment and plan of treatment:	Please continue to take your home medications for management of lupus. In addition, imaging was done of your chest and neck and no clots were seen.  Secondary Diagnosis:	Anti-phospholipid antibody syndrome  Goal:	Management  Assessment and plan of treatment:	You have anti-phospholipid antibody syndrome which is an autoimmune condition that makes you prone to developing blood clots. You need to take longterm blood thinners such as coumadin to prevent clots.

## 2018-11-10 NOTE — H&P ADULT - FAMILY HISTORY
No pertinent family history in first degree relatives No pertinent family history in first degree relatives, No family history of lupus

## 2018-11-10 NOTE — DISCHARGE NOTE ADULT - NS AS DC FOLLOWUP STROKE INST
Coumadin/Warfarin Coumadin/Warfarin/Influenza vaccination (VIS Pub Date: August 7, 2015) Influenza vaccination (VIS Pub Date: August 7, 2015)

## 2018-11-10 NOTE — H&P ADULT - NSHPPHYSICALEXAM_GEN_ALL_CORE
PHYSICAL EXAM:    General: No acute distress.  HEENT: Normocephalic, atraumatic.  PERRL.  EOMI.  No scleral icterus.  Moist MM.  No oropharyngeal exudates.    Neck: Supple.  Full range of motion.  No JVD.  No carotid bruits.  No thyromegaly.  Trachea midline.  No lymphadenopathy.   Heart: RRR.  Normal S1 and S2.  No murmurs, rubs, or gallops.   Lungs: CTAB. No wheezes, crackles, or rhonchi.    Abdomen: BS+, soft, NT/ND.  No organomegaly.  Skin: Warm and dry.  No rashes.  Extremities: No edema, clubbing, or cyanosis.  2+ peripheral pulses b/l.  Musculoskeletal: No deformities.  No spinal or paraspinal tenderness.  Neuro: A&Ox3.  CN II-XII intact.  5/5 strength in UE and LE b/l.  Tactile sensation intact in UE and LE b/l.  Cerebellar function intact as assessed by finger-to-nose test. PHYSICAL EXAM:    General: No acute distress.  HEENT: Normocephalic, atraumatic.  PERRL.  EOMI.    Neck: Supple.  Full range of motion  Heart: RRR.  Normal S1 and S2.  No murmurs, rubs, or gallops.   Lungs: CTAB. No wheezes, crackles, or rhonchi.    Abdomen: BS+, soft, NT/ND.  No organomegaly.  Skin: Warm and dry.  No rashes.  Extremities: No edema, clubbing, or cyanosis.  2+ peripheral pulses b/l.  Musculoskeletal: No deformities.  No spinal or paraspinal tenderness.  Neuro: A&Ox3.  CN II-XII intact. PHYSICAL EXAM:    General: No acute distress.  HEENT: Normocephalic, atraumatic.  PERRL.  EOMI.    Neck: Supple.  Full range of motion  Heart: RRR.  Normal S1 and S2.  No murmurs, rubs, or gallops.   Lungs: CTAB. No wheezes, crackles, or rhonchi.    Abdomen: BS+, soft, NT/ND.  No organomegaly.  Skin: Warm and dry.  No rashes.  Extremities: No edema, clubbing, or cyanosis.  2+ radial pulses b/l. Unable to palpate ulnar pulse on right.  Musculoskeletal: No deformities.  No spinal or paraspinal tenderness.  Neuro: A&Ox3.  CN II-XII intact.

## 2018-11-10 NOTE — H&P ADULT - ASSESSMENT
27 y/o F w/ SLE admitted R Ulnar Artery occlusion 27 y/o F w/ SLE admitted for R Ulnar Artery occlusion

## 2018-11-10 NOTE — H&P ADULT - PROBLEM SELECTOR PLAN 2
- Recently diagnosed 3months ago; c/w Hydroxychloroquine 200mg BID and Prednisone 5mg PO QD  - Rheumatology f/u upon discharge - affecting hand and feet, no other organs appear to be affected  - Recently diagnosed 3months ago; c/w Hydroxychloroquine 200mg BID and Prednisone 5mg PO QD  - Rheumatology f/u upon discharge

## 2018-11-10 NOTE — DISCHARGE NOTE ADULT - ADDITIONAL INSTRUCTIONS
-Followup with your rheumatologist Dianne Álvarez NP, within 1-2 weeks from discharge. You will be on this pill blood thinner, warfarin, which requires you have your blood checked regularly to measure your INR. Your goal INR is 2.5 - 3.5.  -INR checks where _____ -Followup with your rheumatologist Dianne Álvarez NP, within 1-2 weeks from discharge. You will be on this pill blood thinner, warfarin, which requires you have your blood checked regularly to measure your INR. Your goal INR is 2.5 - 3.5.  -INR checks were between 2-3 in the hospital. -Followup with your rheumatologist Dianne Álvarez NP, for further management and treatment of your lupus and anti-phospholipid antibody syndrome. You have an appt for an INR check this Friday, 11/16/2018 at 12:00pm. You will be on warfarin, a blood thinner, which requires you have your blood checked regularly to measure your INR. Your goal INR is 2.5 - 3.5.

## 2018-11-10 NOTE — H&P ADULT - HISTORY OF PRESENT ILLNESS
8F hx of lupus diagnosed 3 months ago sent in to the ED by her rheumatologist for evaluation of right ulnar artery occlusion.  Patient has had intermittent weakness, pain, and warmth in her right arm for the past 2 months.  As part of workup her rheumatologist sent her for OP dopplers, was found to have occlusion of rt ulnar artery and was sent into ED.  She is currently asymptomatic, no pain, no numbness/tingling, no weakness, no pressure. She is able to use her arm without difficulty.  No fevers, chills, n/v.  No hx of blood clots in past. 29 y/o F w/ a pmh significant for SLE (dx 3 mo ago) who presents to the ED per referral from her Rheumatologist for evaluation of right ulnar artery occlusion. Over the last 2 months, pt complained of intermittent weakness, pain, and warmth in her right arm. This has never happened to her before. Her Rheumatologist ordered dopplers which showed of the R ulnar artery and was sent into ED. Overall, she reports feeling well and is asymptomatic. Denies any pain, numbness/tingling, weakness, fevers, chills, hx of blood clots, dysuria or melena.      In the ED, pt afebrile, /70, HR 79, RR 16 (100% RA). VA Duplex shows near total occlusion of the mid to distal R ulnar artery. Pt given Warfarin 5mg x1, started on Hep gtt, and admitted to medicine for management of acute arterial occlusion. 29 y/o F w/ a pmh significant for SLE (dx 3 mo ago) who presents to the ED per referral from her Rheumatologist for evaluation of right ulnar artery occlusion. Over the last 2 months, pt complained of intermittent weakness, pain, and warmth in her right arm. This has never happened to her before. Her Rheumatologist ordered dopplers which showed of the R ulnar artery, started on aspirin, and was sent into ED. Overall, she reports feeling well and is asymptomatic. Denies any pain, numbness/tingling, weakness, fevers, chills, hx of blood clots, dysuria or melena.      In the ED, pt afebrile, /70, HR 79, RR 16 (100% RA). VA Duplex shows near total occlusion of the mid to distal R ulnar artery with collaterals. Pt given Warfarin 5mg x1, started on Hep gtt, and admitted to medicine for management of acute arterial occlusion.

## 2018-11-10 NOTE — DISCHARGE NOTE ADULT - HOSPITAL COURSE
Ms Burgos is a 29yo woman with hx lupus dx 3 years ago that presents with right ulnar artery occlusion.  She was evaluated by vascular who did not recommend surgery.  She was seen by rheumatology who recommended... Ms Burgos is a 27yo woman with hx lupus dx 3 years ago that presents with right ulnar artery occlusion.  She was evaluated by vascular who did not recommend surgery.  She was seen by rheumatology who recommended the patient get APLS labs which were still pending prior to discharge. A chest angio chest and neck was done which did not show evidence of clots given the patient's SLE. This patient is being discharged in stable condition and will need to follow up with their PMD for further management. Ms Burgos is a 27yo woman with hx lupus dx 3 years ago that presents with R arm pain, found to have right ulnar artery occlusion.  She was evaluated by vascular who did not recommend surgery.  She was seen by rheumatology who recommended the patient get APLS labs which were still pending prior to discharge, there is high concern for APLS. A chest angio chest and neck was done which did not show evidence of clots given the patient's SLE. She was treated with heparin drip and bridge to coumadin 5mg/day with goal INR 3.5 - 3.5. The pain in her right arm improved. This patient is being discharged in stable condition and will need to follow up with their PMD for further management. She is to repeat APLS labs after 12 weeks from discharge. She is to get INR checks at _______ Ms Burgos is a 29yo woman with hx lupus dx 3 years ago that presents with R arm pain, found to have right ulnar artery occlusion.  She was evaluated by vascular who did not recommend surgery.  She was seen by rheumatology who recommended the patient get APLS labs which were still pending prior to discharge, there is high concern for APLS. A chest angio chest and neck was done which did not show evidence of clots given the patient's SLE. She was treated with heparin drip and bridge to coumadin 5mg/day with goal INR 3.5 - 3.5. The pain in her right arm improved. This patient is being discharged in stable condition and will need to follow up with their PMD for further management. She is to repeat APLS labs after 12 weeks from discharge. She is to get INR checks with Dianne Álvarez. Ms Burgos is a 27yo woman with hx lupus dx 3 years ago that presents with R arm pain, found to have right ulnar artery occlusion.  She was evaluated by vascular who did not recommend surgery.  She was seen by rheumatology who recommended the patient get APLS labs which were still pending prior to discharge, there is high concern for APLS. A chest angio chest and neck was done which did not show evidence of clots given the patient's SLE. She was treated with heparin drip and bridge to coumadin 5mg/day with goal INR 3.5 - 3.5. The pain in her right arm improved. This patient is being discharged in stable condition and will need to follow up with their PMD for further management. APLS labs were positive. She was discharged on coumadin 6.5mg qHS for 2 days and has an INR appt on 11/16. She is to get INR checks with Dianne Álvarez. Ms Burgos is a 27yo woman with hx lupus dx 3 years ago that presents with R arm pain, found to have right ulnar artery occlusion.  She was evaluated by vascular who did not recommend surgery.  She was seen by rheumatology who recommended the patient get APLS labs which were still pending prior to discharge, there is high concern for APLS. A chest angio chest and neck was done which did not show evidence of clots given the patient's SLE. She was treated with heparin drip and bridge to coumadin 5mg/day with goal INR 2.5 - 3.5. The pain in her right arm improved, WWP. This patient is being discharged in stable condition and will need to follow up with their PMD for further management. APLS labs were positive. She was discharged on coumadin 6.5mg qHS for 2 days and has an INR appt on 11/16, d/w rheum prior to d/c who is okay with discharge w/ close f/u. She is to get INR checks with Dianne Álvarez.

## 2018-11-10 NOTE — DISCHARGE NOTE ADULT - PROVIDER TOKENS
FREE:[LAST:[Henri],FIRST:[Dianne],PHONE:[phone]],FAX:[(   [phone]    -],ADDRESS:[[address] [place] [address]]]

## 2018-11-11 LAB
ANION GAP SERPL CALC-SCNC: 12 MMOL/L — SIGNIFICANT CHANGE UP (ref 5–17)
APTT BLD: 122.3 SEC — CRITICAL HIGH (ref 27.5–36.3)
APTT BLD: 138.1 SEC — CRITICAL HIGH (ref 27.5–36.3)
APTT BLD: 83.9 SEC — HIGH (ref 27.5–36.3)
BUN SERPL-MCNC: 7 MG/DL — SIGNIFICANT CHANGE UP (ref 7–23)
CALCIUM SERPL-MCNC: 9 MG/DL — SIGNIFICANT CHANGE UP (ref 8.4–10.5)
CHLORIDE SERPL-SCNC: 103 MMOL/L — SIGNIFICANT CHANGE UP (ref 96–108)
CO2 SERPL-SCNC: 25 MMOL/L — SIGNIFICANT CHANGE UP (ref 22–31)
CREAT SERPL-MCNC: 0.65 MG/DL — SIGNIFICANT CHANGE UP (ref 0.5–1.3)
GLUCOSE SERPL-MCNC: 89 MG/DL — SIGNIFICANT CHANGE UP (ref 70–99)
HCT VFR BLD CALC: 39.2 % — SIGNIFICANT CHANGE UP (ref 34.5–45)
HGB BLD-MCNC: 14.4 G/DL — SIGNIFICANT CHANGE UP (ref 11.5–15.5)
MCHC RBC-ENTMCNC: 31.2 PG — SIGNIFICANT CHANGE UP (ref 27–34)
MCHC RBC-ENTMCNC: 36.7 GM/DL — HIGH (ref 32–36)
MCV RBC AUTO: 85.2 FL — SIGNIFICANT CHANGE UP (ref 80–100)
PLATELET # BLD AUTO: 296 K/UL — SIGNIFICANT CHANGE UP (ref 150–400)
POTASSIUM SERPL-MCNC: 4.1 MMOL/L — SIGNIFICANT CHANGE UP (ref 3.5–5.3)
POTASSIUM SERPL-SCNC: 4.1 MMOL/L — SIGNIFICANT CHANGE UP (ref 3.5–5.3)
RBC # BLD: 4.6 M/UL — SIGNIFICANT CHANGE UP (ref 3.8–5.2)
RBC # FLD: 11.5 % — SIGNIFICANT CHANGE UP (ref 10.3–14.5)
SODIUM SERPL-SCNC: 140 MMOL/L — SIGNIFICANT CHANGE UP (ref 135–145)
WBC # BLD: 7.8 K/UL — SIGNIFICANT CHANGE UP (ref 3.8–10.5)
WBC # FLD AUTO: 7.8 K/UL — SIGNIFICANT CHANGE UP (ref 3.8–10.5)

## 2018-11-11 PROCEDURE — 99232 SBSQ HOSP IP/OBS MODERATE 35: CPT | Mod: GC

## 2018-11-11 RX ORDER — POLYETHYLENE GLYCOL 3350 17 G/17G
17 POWDER, FOR SOLUTION ORAL DAILY
Qty: 0 | Refills: 0 | Status: DISCONTINUED | OUTPATIENT
Start: 2018-11-11 | End: 2018-11-14

## 2018-11-11 RX ORDER — WARFARIN SODIUM 2.5 MG/1
6 TABLET ORAL ONCE
Qty: 0 | Refills: 0 | Status: COMPLETED | OUTPATIENT
Start: 2018-11-11 | End: 2018-11-11

## 2018-11-11 RX ORDER — SENNA PLUS 8.6 MG/1
2 TABLET ORAL AT BEDTIME
Qty: 0 | Refills: 0 | Status: DISCONTINUED | OUTPATIENT
Start: 2018-11-11 | End: 2018-11-14

## 2018-11-11 RX ORDER — DOCUSATE SODIUM 100 MG
100 CAPSULE ORAL
Qty: 0 | Refills: 0 | Status: DISCONTINUED | OUTPATIENT
Start: 2018-11-11 | End: 2018-11-14

## 2018-11-11 RX ADMIN — HEPARIN SODIUM 0 UNIT(S)/HR: 5000 INJECTION INTRAVENOUS; SUBCUTANEOUS at 14:36

## 2018-11-11 RX ADMIN — Medication 200 MILLIGRAM(S): at 17:24

## 2018-11-11 RX ADMIN — Medication 100 MILLIGRAM(S): at 17:24

## 2018-11-11 RX ADMIN — HEPARIN SODIUM 900 UNIT(S)/HR: 5000 INJECTION INTRAVENOUS; SUBCUTANEOUS at 06:26

## 2018-11-11 RX ADMIN — WARFARIN SODIUM 6 MILLIGRAM(S): 2.5 TABLET ORAL at 22:03

## 2018-11-11 RX ADMIN — HEPARIN SODIUM 700 UNIT(S)/HR: 5000 INJECTION INTRAVENOUS; SUBCUTANEOUS at 22:26

## 2018-11-11 RX ADMIN — Medication 200 MILLIGRAM(S): at 06:06

## 2018-11-11 RX ADMIN — Medication 81 MILLIGRAM(S): at 11:54

## 2018-11-11 RX ADMIN — HEPARIN SODIUM 700 UNIT(S)/HR: 5000 INJECTION INTRAVENOUS; SUBCUTANEOUS at 15:39

## 2018-11-11 RX ADMIN — Medication 5 MILLIGRAM(S): at 06:06

## 2018-11-11 NOTE — DIETITIAN INITIAL EVALUATION ADULT. - ADHERENCE
n/a/Pt denied following any specific dietary restrictions PTA. Reports NKFA. States she takes vitamin B complex daily at home

## 2018-11-11 NOTE — DIETITIAN INITIAL EVALUATION ADULT. - PROBLEM SELECTOR PLAN 1
- Evaluated by Vascular surgery- No surgical intervention at this time  - f/u vascular recs.  - spoke with vasc surgery, they will f/u with further recs. likely no need for TTE.  - right hand perfusing well.   - will r/o APLS for now. otherwise f/u outpt Rheum/Heme for hypercoag workup  - c/w ASA  - c/w Hep gtt and d/c on therapeutic a/c  - consider avoiding coumadin given risk of birth defects. confirm insurance coverage.

## 2018-11-11 NOTE — DIETITIAN INITIAL EVALUATION ADULT. - ENERGY NEEDS
Ht 59 inches Wt 133.6 pounds BMI 27 Kg/m^2  IBW 98 pounds +/- 10%; 136% IBW  Edema: none Skin: no pressure injuries per nursing flow sheet   Other pertinent information: 29 yo female recently diagnosed with SLE presenting from rheumatologist's office 2/2 R ulnar artery occlusion and currently on heparin infusion

## 2018-11-11 NOTE — DIETITIAN INITIAL EVALUATION ADULT. - NS FNS REASON FOR WEIGHT CHANG
Pt reports UBW of 160 pounds around 7/2018. States she initially lost 30 pounds to 130 pounds around 9/2018 2/2 decreased PO Intakes/appetite. States she was diagnosed with SLE 9/2018 and prescribed steroids, which assisted in increasing her appetite with subsequent weight gain of 8 pounds to reported current weight of 138 pounds. Noted admit weight of 133.6 pounds

## 2018-11-11 NOTE — PROGRESS NOTE ADULT - PROBLEM SELECTOR PLAN 1
- Evaluated by Vascular surgery- No surgical intervention at this time  - f/u vascular recs.  - right hand perfusing well.   - will r/o APLS for now. otherwise f/u outpt Rheum/Heme for hypercoag workup  - if APLS negative, can consider echo for cardiac thrombi  - c/w ASA  - c/w Hep gtt and d/c on therapeutic a/c  - will dose coumadin tonight

## 2018-11-11 NOTE — DIETITIAN INITIAL EVALUATION ADULT. - NS AS NUTRI INTERV MEALS SNACK
1) Continue regular diet 2) Provide food preferences within dietary restrictions when feasible 3) Encourage continued good PO Intake of meals

## 2018-11-11 NOTE — DIETITIAN INITIAL EVALUATION ADULT. - NS AS NUTRI INTERV COLLABORAT
Subjective:       Patient ID: Patria Nieto is a 66 y.o. male.    Chief Complaint: Sinusitis and Cough    HPI   65 yo male pt of Dr. Félix Rojas with uncontrolled DM, Type 2, HTN, and hyperlipidemia presents for an urgent care visit c/o cough and wheezing. Pt has been seen by Dr. Rupesh Patton (ENT)  on 7/24/17 during which he was diagnosed with a sinus infection and treated with a steroid injection and Azithromycin. Pt initially felt better but then his symptoms recurred. Pt was seen by Dr. Patton on 7/31/17 and he received Prednisone tablets and Doxycycline. Pt completed the course of Doxycyline but discontinued the Prednisone after 2 days due to elevated blood sugars. Pt notes persistent cough which is productive of white sputum. Pt notes wheezing and constant postnasal drip. No fever, chills, facial pain. No nasal congestion. Pt requests an inhaler for treatment of the wheezing and cough. Pt is unsure if he used an Albuterol inhaler. His chart shows an intolerance to Metaproterenol which he is unaware of. A call was placed to his pharmacy which has no allergies noted for the patient. Pt would like a trial of Albuterol which he thinks he has taken in the past.  Review of Systems   Constitutional: Negative for chills and fever.   HENT: Negative for sore throat and trouble swallowing.    Respiratory: Positive for cough and wheezing. Negative for shortness of breath.    Cardiovascular: Negative for chest pain, palpitations and leg swelling.   Gastrointestinal: Negative for abdominal pain, diarrhea, nausea and vomiting.   Genitourinary: Negative for dysuria and hematuria.       Objective:      Physical Exam   Constitutional: He appears well-developed and well-nourished.   HENT:   Head: Normocephalic and atraumatic.   Right Ear: External ear normal.   Left Ear: External ear normal.   Nose: Nose normal.   Mouth/Throat: Oropharynx is clear and moist. No oropharyngeal exudate.   Eyes: Conjunctivae and EOM  are normal. Pupils are equal, round, and reactive to light. Right eye exhibits no discharge. Left eye exhibits no discharge. No scleral icterus.   Neck: Normal range of motion. Neck supple. No JVD present. No thyromegaly present.   Cardiovascular: Normal rate, regular rhythm and normal heart sounds.    Pulmonary/Chest: Effort normal. No respiratory distress. He has rhonchi in the right upper field and the left upper field. He has no rales. He exhibits no tenderness.   Abdominal: Soft. Bowel sounds are normal. He exhibits no mass. There is no tenderness.   Lymphadenopathy:     He has no cervical adenopathy.   Vitals reviewed.      Assessment:       See plan  Plan:       Patria was seen today for sinusitis and cough.    Diagnoses and all orders for this visit:    Wheezing  -  Probable due to bronchospasm  -     albuterol 90 mcg/actuation inhaler; Inhale 2 puffs into the lungs every 6 (six) hours as needed for Wheezing. Rescue. Pt to use Albuterol qid x 7 days and then prn  -    Mucinex 1-2 tabs twice daily as needed for cough.    Chronic sinusitis, unspecified location  -     albuterol 90 mcg/actuation inhaler; Inhale 2 puffs into the lungs every 6 (six) hours as needed for Wheezing. Rescue    Type 2 diabetes, uncontrolled, with neuropathy  - Continue f/u with Endocrinology.    F/U as needed.       RD remains available prn, Lora Riddle RD CDN Pager #[phone]

## 2018-11-11 NOTE — DIETITIAN INITIAL EVALUATION ADULT. - ORAL INTAKE PTA
Pt endorsed a good appetite & PO intakes for the last 2-3 months. Usual Diet Recall: Breakfast- egg sandwich Lunch- protein, starch, vegetables Dinner- similar to lunch Snacks- chips. Diet recall revealing adequate intakes/good

## 2018-11-11 NOTE — DIETITIAN INITIAL EVALUATION ADULT. - OTHER INFO
Nutrition consult received for unintentional weight loss of >10%. Reports good appetite & PO intakes currently. States she had a full breakfast this morning and, as per chart, 100% PO intake x1 meal noted. Denies chewing/swallowing difficulty. Denies nausea/emesis. No GI distress

## 2018-11-11 NOTE — DIETITIAN INITIAL EVALUATION ADULT. - PROBLEM SELECTOR PLAN 2
- affecting hand and feet, no other organs appear to be affected  - Recently diagnosed 3months ago; c/w Hydroxychloroquine 200mg BID and Prednisone 5mg PO QD  - Rheumatology f/u upon discharge

## 2018-11-11 NOTE — PROGRESS NOTE ADULT - SUBJECTIVE AND OBJECTIVE BOX
Patient is a 28y old  Female who presents with a chief complaint of Arterial occlusion (10 Nov 2018 18:19)      Overnight Events:      REVIEW OF SYSTEMS:  CONSTITUTIONAL: No weakness, fevers or chills  EYES/ENT: No visual changes, no throat pain   RESPIRATORY: No cough, wheezing, hemoptysis; No shortness of breath  CARDIOVASCULAR: No chest pain or palpitations  GASTROINTESTINAL: No abdominal, nausea, vomiting, or hematemesis; No diarrhea or constipation. No melena or hematochezia.  GENITOURINARY: No dysuria, frequency or hematuria  NEUROLOGICAL: No dizziness, numbness, or weakness  SKIN: No itching, burning, rashes, or lesions   All other review of systems is negative unless indicated above.    VITAL SIGNS:  Vital Signs Last 24 Hrs  T(C): 36.9 (11 Nov 2018 12:41), Max: 36.9 (10 Nov 2018 14:39)  T(F): 98.4 (11 Nov 2018 12:41), Max: 98.4 (10 Nov 2018 14:39)  HR: 91 (11 Nov 2018 12:41) (80 - 91)  BP: 106/75 (11 Nov 2018 12:41) (103/68 - 111/78)  BP(mean): --  RR: 16 (11 Nov 2018 12:41) (16 - 17)  SpO2: 98% (11 Nov 2018 12:41) (96% - 100%)    PHYSICAL EXAM:   GENERAL: no acute distress  HEENT: NC/AT, EOMI, neck supple, MMM  RESPIRATORY: LCTAB/L, no rhonchi, rales, or wheezing  CARDIOVASCULAR: RRR, no murmurs, gallops, rubs  ABDOMINAL: soft, non-tender, non-distended, positive bowel sounds   EXTREMITIES: no clubbing, cyanosis, or edema  NEUROLOGICAL: alert and oriented x 3, non-focal  SKIN: no rashes or lesions   MUSCULOSKELETAL: no gross joint deformity                          14.4   7.8   )-----------( 296      ( 11 Nov 2018 05:28 )             39.2     11-11    140  |  103  |  7   ----------------------------<  89  4.1   |  25  |  0.65    Ca    9.0      11 Nov 2018 05:28    TPro  8.1  /  Alb  4.6  /  TBili  0.4  /  DBili  x   /  AST  18  /  ALT  12  /  AlkPhos  60  11-09    PT/INR - ( 11 Nov 2018 05:28 )   PT: 15.9 sec;   INR: 1.37 ratio         PTT - ( 11 Nov 2018 05:28 )  PTT:122.3 sec    CAPILLARY BLOOD GLUCOSE        I&O's Summary    10 Nov 2018 07:01  -  11 Nov 2018 07:00  --------------------------------------------------------  IN: 480 mL / OUT: 2 mL / NET: 478 mL        MEDICATIONS  (STANDING):  aspirin  chewable 81 milliGRAM(s) Oral daily  docusate sodium 100 milliGRAM(s) Oral two times a day  heparin  Infusion.  Unit(s)/Hr (11 mL/Hr) IV Continuous <Continuous>  hydroxychloroquine 200 milliGRAM(s) Oral two times a day  predniSONE   Tablet 5 milliGRAM(s) Oral daily  senna 2 Tablet(s) Oral at bedtime  warfarin 6 milliGRAM(s) Oral once Patient is a 28y old  Female who presents with a chief complaint of Arterial occlusion (10 Nov 2018 18:19)      Overnight Events:  No acute events ON.     REVIEW OF SYSTEMS:  CONSTITUTIONAL: No weakness, fevers or chills  EYES/ENT: No visual changes, no throat pain   RESPIRATORY: No cough, wheezing, hemoptysis; No shortness of breath  CARDIOVASCULAR: No chest pain or palpitations  GASTROINTESTINAL: No abdominal, nausea, vomiting, or hematemesis; No diarrhea or constipation. No melena or hematochezia.  GENITOURINARY: No dysuria, frequency or hematuria  NEUROLOGICAL: No dizziness, numbness, or weakness  SKIN: No itching, burning, rashes, or lesions   All other review of systems is negative unless indicated above.    VITAL SIGNS:  Vital Signs Last 24 Hrs  T(C): 36.9 (11 Nov 2018 12:41), Max: 36.9 (10 Nov 2018 14:39)  T(F): 98.4 (11 Nov 2018 12:41), Max: 98.4 (10 Nov 2018 14:39)  HR: 91 (11 Nov 2018 12:41) (80 - 91)  BP: 106/75 (11 Nov 2018 12:41) (103/68 - 111/78)  BP(mean): --  RR: 16 (11 Nov 2018 12:41) (16 - 17)  SpO2: 98% (11 Nov 2018 12:41) (96% - 100%)    PHYSICAL EXAM:   GENERAL: no acute distress  HEENT: NC/AT, EOMI, neck supple, MMM  RESPIRATORY: LCTAB/L, no rhonchi, rales, or wheezing  CARDIOVASCULAR: RRR, no murmurs, gallops, rubs  ABDOMINAL: soft, non-tender, non-distended, positive bowel sounds   EXTREMITIES: no clubbing, cyanosis, or edema  NEUROLOGICAL: alert and oriented x 3, non-focal  SKIN: no rashes or lesions   VASCULAR: diminished R ulnar pulse  MUSCULOSKELETAL: no gross joint deformity                          14.4   7.8   )-----------( 296      ( 11 Nov 2018 05:28 )             39.2     11-11    140  |  103  |  7   ----------------------------<  89  4.1   |  25  |  0.65    Ca    9.0      11 Nov 2018 05:28    TPro  8.1  /  Alb  4.6  /  TBili  0.4  /  DBili  x   /  AST  18  /  ALT  12  /  AlkPhos  60  11-09    PT/INR - ( 11 Nov 2018 05:28 )   PT: 15.9 sec;   INR: 1.37 ratio         PTT - ( 11 Nov 2018 05:28 )  PTT:122.3 sec    CAPILLARY BLOOD GLUCOSE        I&O's Summary    10 Nov 2018 07:01  -  11 Nov 2018 07:00  --------------------------------------------------------  IN: 480 mL / OUT: 2 mL / NET: 478 mL        MEDICATIONS  (STANDING):  aspirin  chewable 81 milliGRAM(s) Oral daily  docusate sodium 100 milliGRAM(s) Oral two times a day  heparin  Infusion.  Unit(s)/Hr (11 mL/Hr) IV Continuous <Continuous>  hydroxychloroquine 200 milliGRAM(s) Oral two times a day  predniSONE   Tablet 5 milliGRAM(s) Oral daily  senna 2 Tablet(s) Oral at bedtime  warfarin 6 milliGRAM(s) Oral once

## 2018-11-12 ENCOUNTER — APPOINTMENT (OUTPATIENT)
Dept: CT IMAGING | Facility: CLINIC | Age: 28
End: 2018-11-12

## 2018-11-12 ENCOUNTER — APPOINTMENT (OUTPATIENT)
Dept: VASCULAR SURGERY | Facility: CLINIC | Age: 28
End: 2018-11-12

## 2018-11-12 LAB
ANION GAP SERPL CALC-SCNC: 12 MMOL/L — SIGNIFICANT CHANGE UP (ref 5–17)
APTT BLD: 138.6 SEC — CRITICAL HIGH (ref 27.5–36.3)
APTT BLD: 65.6 SEC — HIGH (ref 27.5–36.3)
APTT BLD: 78 SEC — HIGH (ref 27.5–36.3)
BUN SERPL-MCNC: 10 MG/DL — SIGNIFICANT CHANGE UP (ref 7–23)
CALCIUM SERPL-MCNC: 9.4 MG/DL — SIGNIFICANT CHANGE UP (ref 8.4–10.5)
CHLORIDE SERPL-SCNC: 104 MMOL/L — SIGNIFICANT CHANGE UP (ref 96–108)
CO2 SERPL-SCNC: 25 MMOL/L — SIGNIFICANT CHANGE UP (ref 22–31)
CREAT SERPL-MCNC: 0.7 MG/DL — SIGNIFICANT CHANGE UP (ref 0.5–1.3)
DRVVT SCREEN TO CONFIRM RATIO: ABNORMAL
DSDNA AB SER-ACNC: 131 IU/ML — HIGH
GLUCOSE SERPL-MCNC: 92 MG/DL — SIGNIFICANT CHANGE UP (ref 70–99)
HCT VFR BLD CALC: 40.6 % — SIGNIFICANT CHANGE UP (ref 34.5–45)
HGB BLD-MCNC: 14.6 G/DL — SIGNIFICANT CHANGE UP (ref 11.5–15.5)
INR BLD: 1.77 RATIO — HIGH (ref 0.88–1.16)
LA NT DPL PPP QL: 62.4 SEC — SIGNIFICANT CHANGE UP
MAGNESIUM SERPL-MCNC: 1.9 MG/DL — SIGNIFICANT CHANGE UP (ref 1.6–2.6)
MCHC RBC-ENTMCNC: 30.8 PG — SIGNIFICANT CHANGE UP (ref 27–34)
MCHC RBC-ENTMCNC: 36 GM/DL — SIGNIFICANT CHANGE UP (ref 32–36)
MCV RBC AUTO: 85.5 FL — SIGNIFICANT CHANGE UP (ref 80–100)
NORMALIZED SCT PPP-RTO: 1.78 RATIO — HIGH (ref 0–1.16)
NORMALIZED SCT PPP-RTO: ABNORMAL
PHOSPHATE SERPL-MCNC: 3.9 MG/DL — SIGNIFICANT CHANGE UP (ref 2.5–4.5)
PLATELET # BLD AUTO: 303 K/UL — SIGNIFICANT CHANGE UP (ref 150–400)
POTASSIUM SERPL-MCNC: 3.8 MMOL/L — SIGNIFICANT CHANGE UP (ref 3.5–5.3)
POTASSIUM SERPL-SCNC: 3.8 MMOL/L — SIGNIFICANT CHANGE UP (ref 3.5–5.3)
PROTHROM AB SERPL-ACNC: 20.5 SEC — HIGH (ref 10–12.9)
RBC # BLD: 4.75 M/UL — SIGNIFICANT CHANGE UP (ref 3.8–5.2)
RBC # FLD: 12.2 % — SIGNIFICANT CHANGE UP (ref 10.3–14.5)
SODIUM SERPL-SCNC: 141 MMOL/L — SIGNIFICANT CHANGE UP (ref 135–145)
WBC # BLD: 7.9 K/UL — SIGNIFICANT CHANGE UP (ref 3.8–10.5)
WBC # FLD AUTO: 7.9 K/UL — SIGNIFICANT CHANGE UP (ref 3.8–10.5)

## 2018-11-12 PROCEDURE — 71275 CT ANGIOGRAPHY CHEST: CPT | Mod: 26

## 2018-11-12 PROCEDURE — 70498 CT ANGIOGRAPHY NECK: CPT | Mod: 26

## 2018-11-12 PROCEDURE — 99233 SBSQ HOSP IP/OBS HIGH 50: CPT | Mod: GC

## 2018-11-12 RX ORDER — WARFARIN SODIUM 2.5 MG/1
6 TABLET ORAL ONCE
Qty: 0 | Refills: 0 | Status: COMPLETED | OUTPATIENT
Start: 2018-11-12 | End: 2018-11-12

## 2018-11-12 RX ORDER — SODIUM CHLORIDE 9 MG/ML
1000 INJECTION INTRAMUSCULAR; INTRAVENOUS; SUBCUTANEOUS
Qty: 0 | Refills: 0 | Status: DISCONTINUED | OUTPATIENT
Start: 2018-11-12 | End: 2018-11-14

## 2018-11-12 RX ADMIN — Medication 200 MILLIGRAM(S): at 17:24

## 2018-11-12 RX ADMIN — HEPARIN SODIUM 0 UNIT(S)/HR: 5000 INJECTION INTRAVENOUS; SUBCUTANEOUS at 05:28

## 2018-11-12 RX ADMIN — HEPARIN SODIUM 500 UNIT(S)/HR: 5000 INJECTION INTRAVENOUS; SUBCUTANEOUS at 13:04

## 2018-11-12 RX ADMIN — WARFARIN SODIUM 6 MILLIGRAM(S): 2.5 TABLET ORAL at 21:50

## 2018-11-12 RX ADMIN — HEPARIN SODIUM 500 UNIT(S)/HR: 5000 INJECTION INTRAVENOUS; SUBCUTANEOUS at 06:26

## 2018-11-12 RX ADMIN — Medication 81 MILLIGRAM(S): at 12:31

## 2018-11-12 RX ADMIN — Medication 200 MILLIGRAM(S): at 05:32

## 2018-11-12 RX ADMIN — HEPARIN SODIUM 500 UNIT(S)/HR: 5000 INJECTION INTRAVENOUS; SUBCUTANEOUS at 20:26

## 2018-11-12 RX ADMIN — Medication 5 MILLIGRAM(S): at 05:32

## 2018-11-12 NOTE — PROVIDER CONTACT NOTE (CRITICAL VALUE NOTIFICATION) - ACTION/TREATMENT ORDERED:
per nomogram, hold heparin gtt for 1 hour, restart at 7cc/hour, repeat PTT in 6 hours
[facility] protocol . Heparin held from 0525 to 0625 . Restarted at 0625 at 5 cc/hr . Will continue to monitor

## 2018-11-12 NOTE — PROGRESS NOTE ADULT - SUBJECTIVE AND OBJECTIVE BOX
VASCULAR SURGERY NOTE    Patient reporting no R hand pain, coolness, weakness or numbness.    MEDICATIONS  (STANDING):  aspirin  chewable 81 milliGRAM(s) Oral daily  docusate sodium 100 milliGRAM(s) Oral two times a day  heparin  Infusion.  Unit(s)/Hr (11 mL/Hr) IV Continuous <Continuous>  hydroxychloroquine 200 milliGRAM(s) Oral two times a day  predniSONE   Tablet 5 milliGRAM(s) Oral daily  senna 2 Tablet(s) Oral at bedtime  sodium chloride 0.9%. 1000 milliLiter(s) (75 mL/Hr) IV Continuous <Continuous>    MEDICATIONS  (PRN):  heparin  Injectable 5000 Unit(s) IV Push every 6 hours PRN For aPTT less than 40  heparin  Injectable 2500 Unit(s) IV Push every 6 hours PRN For aPTT between 40 - 57  polyethylene glycol 3350 17 Gram(s) Oral daily PRN Constipation      Vital Signs Last 24 Hrs  T(C): 36.7 (13 Nov 2018 04:47), Max: 36.8 (12 Nov 2018 05:41)  T(F): 98 (13 Nov 2018 04:47), Max: 98.2 (12 Nov 2018 05:41)  HR: 92 (13 Nov 2018 04:47) (70 - 92)  BP: 109/73 (13 Nov 2018 04:47) (101/67 - 109/73)  BP(mean): --  RR: 18 (13 Nov 2018 04:47) (16 - 18)  SpO2: 99% (13 Nov 2018 04:47) (98% - 100%)  CAPILLARY BLOOD GLUCOSE          I&O's Detail    11 Nov 2018 07:01  -  12 Nov 2018 07:00  --------------------------------------------------------  IN:    heparin  Infusion.: 72.5 mL  Total IN: 72.5 mL    OUT:    Voided: 2 mL  Total OUT: 2 mL    Total NET: 70.5 mL          GEN: NAD, alert and oriented x 3  ABD: Soft, non-tender, non-distended  EXT/VASC:                          RUE - No edema/erythema or deformity. Warm, cap refill < 2 sec. Motor and sensory function intact.                                         radial  -  palpable [ x ]       doppler signal [  ]                                       ulnar  -  palpable [  ]       doppler signal [  ]                                       brachial  -  palpable [ x ]       doppler signal [  ]                       LUE  - No edema/erythema or deformity. Warm, cap refill < 2 sec. Motor and sensory function intact.                                        radial  -  palpable [ x ]       doppler signal [  ]                                       ulnar  -  palpable [ x ]       doppler signal [  ]                                       brachial  -  palpable [ x ]       doppler signal [  ]      LABS:                        14.6   7.9   )-----------( 303      ( 12 Nov 2018 04:47 )             40.6     11-12    141  |  104  |  10  ----------------------------<  92  3.8   |  25  |  0.70    Ca    9.4      12 Nov 2018 04:47  Phos  3.9     11-12  Mg     1.9     11-12      Lactate:    PT/INR - ( 12 Nov 2018 04:47 )   PT: 20.5 sec;   INR: 1.77 ratio         PTT - ( 12 Nov 2018 20:02 )  PTT:65.6 sec              IMAGING:  CTA of the neck and chest performed.

## 2018-11-12 NOTE — PROGRESS NOTE ADULT - PROBLEM SELECTOR PLAN 1
- Evaluated by Vascular surgery- No surgical intervention at this time  - f/u vascular recs.  - right hand perfusing well.   - will r/o APLS for now. otherwise f/u outpt Rheum/Heme for hypercoag workup  - if APLS negative, can consider echo for cardiac thrombi  - c/w ASA  - c/w Hep gtt and d/c on therapeutic a/c  - will dose coumadin tonight - Evaluated by Vascular surgery- No surgical intervention at this time  - f/u vascular recs.  - right hand perfusing well.   - will r/o APLS for now. otherwise f/u outpt Rheum/Heme for hypercoag workup  - if APLS negative, can consider echo for cardiac thrombi (will take 3-5 days for labs to result)--> needs CT angio chest/neck as well  - c/w ASA  - c/w Hep gtt and d/c on therapeutic a/c  - will dose coumadin tonight

## 2018-11-12 NOTE — PROGRESS NOTE ADULT - SUBJECTIVE AND OBJECTIVE BOX
Authored by Dr. Benoit, PGY-[id number] [phone]  [facility] B Medicine Team      Patient is a 28y old  Female who presents with a chief complaint of Arterial occlusion (2018 13:48)    SUBJECTIVE / OVERNIGHT EVENTS:    No acute overnight events.   Denies any CP, SOB, N/V, abd pain, F/C. Patient denies any loss of sensation in the right forearm or warmth/swelling.    MEDICATIONS  (STANDING):  aspirin  chewable 81 milliGRAM(s) Oral daily  docusate sodium 100 milliGRAM(s) Oral two times a day  heparin  Infusion.  Unit(s)/Hr (11 mL/Hr) IV Continuous <Continuous>  hydroxychloroquine 200 milliGRAM(s) Oral two times a day  predniSONE   Tablet 5 milliGRAM(s) Oral daily  senna 2 Tablet(s) Oral at bedtime    MEDICATIONS  (PRN):  heparin  Injectable 5000 Unit(s) IV Push every 6 hours PRN For aPTT less than 40  heparin  Injectable 2500 Unit(s) IV Push every 6 hours PRN For aPTT between 40 - 57  polyethylene glycol 3350 17 Gram(s) Oral daily PRN Constipation      Vital Signs Last 24 Hrs  T(C): 36.8 (2018 05:41), Max: 36.9 (2018 12:41)  T(F): 98.2 (2018 05:41), Max: 98.4 (2018 12:41)  HR: 70 (2018 05:41) (70 - 91)  BP: 101/67 (2018 05:41) (100/68 - 106/75)  BP(mean): --  RR: 18 (2018 05:41) (16 - 18)  SpO2: 100% (2018 05:41) (98% - 100%)  CAPILLARY BLOOD GLUCOSE        I&O's Summary    2018 07:01  -  2018 07:00  --------------------------------------------------------  IN: 72.5 mL / OUT: 2 mL / NET: 70.5 mL        PHYSICAL EXAM  GENERAL: NAD, well-developed  EYES: conjunctiva and sclera clear  NECK: Supple, No JVD  ENT: MMM  CHEST/LUNG: Clear to auscultation bilaterally; No wheeze  HEART: Regular rate and rhythm; No murmurs  ABDOMEN: Soft, Nontender, Nondistended; Bowel sounds present  EXTREMITIES:  right forearm is not swollen or erythematous 2+ Peripheral Pulses, No clubbing, cyanosis, or edema  NEURO: nonfocal, AOx3  PSYCH: calm   SKIN: No rashes or lesions    LABS:                        14.6   7.9   )-----------( 303      ( 2018 04:47 )             40.6     -12    141  |  104  |  10  ----------------------------<  92  3.8   |  25  |  0.70    Ca    9.4      2018 04:47  Phos  3.9     -  Mg     1.9     -12      PT/INR - ( 2018 04:47 )   PT: 20.5 sec;   INR: 1.77 ratio         PTT - ( 2018 04:47 )  PTT:138.6 sec      Urinalysis Basic - ( 10 Nov 2018 14:32 )    Color: Yellow / Appearance: Clear / S.015 / pH: x  Gluc: x / Ketone: Negative  / Bili: Negative / Urobili: Negative   Blood: x / Protein: Trace / Nitrite: Negative   Leuk Esterase: Moderate / RBC: 1 /hpf / WBC 10 /hpf   Sq Epi: x / Non Sq Epi: 4 /hpf / Bacteria: Few          RADIOLOGY & ADDITIONAL TESTS:    Imaging Personally Reviewed:  Consultant(s) Notes Reviewed:    Care Discussed with Consultants/Other Providers:

## 2018-11-13 LAB
ANION GAP SERPL CALC-SCNC: 14 MMOL/L — SIGNIFICANT CHANGE UP (ref 5–17)
APTT BLD: 63.5 SEC — HIGH (ref 27.5–36.3)
B2 GLYCOPROT1 AB SER QL: POSITIVE
BUN SERPL-MCNC: 11 MG/DL — SIGNIFICANT CHANGE UP (ref 7–23)
CALCIUM SERPL-MCNC: 9.2 MG/DL — SIGNIFICANT CHANGE UP (ref 8.4–10.5)
CARDIOLIPIN AB SER-ACNC: POSITIVE
CHLORIDE SERPL-SCNC: 102 MMOL/L — SIGNIFICANT CHANGE UP (ref 96–108)
CO2 SERPL-SCNC: 22 MMOL/L — SIGNIFICANT CHANGE UP (ref 22–31)
CREAT SERPL-MCNC: 0.68 MG/DL — SIGNIFICANT CHANGE UP (ref 0.5–1.3)
GLUCOSE SERPL-MCNC: 79 MG/DL — SIGNIFICANT CHANGE UP (ref 70–99)
HCT VFR BLD CALC: 39.7 % — SIGNIFICANT CHANGE UP (ref 34.5–45)
HGB BLD-MCNC: 14.3 G/DL — SIGNIFICANT CHANGE UP (ref 11.5–15.5)
INR BLD: 2.34 RATIO — HIGH (ref 0.88–1.16)
MAGNESIUM SERPL-MCNC: 1.9 MG/DL — SIGNIFICANT CHANGE UP (ref 1.6–2.6)
MCHC RBC-ENTMCNC: 30.8 PG — SIGNIFICANT CHANGE UP (ref 27–34)
MCHC RBC-ENTMCNC: 36.1 GM/DL — HIGH (ref 32–36)
MCV RBC AUTO: 85.3 FL — SIGNIFICANT CHANGE UP (ref 80–100)
PHOSPHATE SERPL-MCNC: 3.9 MG/DL — SIGNIFICANT CHANGE UP (ref 2.5–4.5)
PLATELET # BLD AUTO: 290 K/UL — SIGNIFICANT CHANGE UP (ref 150–400)
POTASSIUM SERPL-MCNC: 3.9 MMOL/L — SIGNIFICANT CHANGE UP (ref 3.5–5.3)
POTASSIUM SERPL-SCNC: 3.9 MMOL/L — SIGNIFICANT CHANGE UP (ref 3.5–5.3)
PROTHROM AB SERPL-ACNC: 27.6 SEC — HIGH (ref 10–12.9)
RBC # BLD: 4.66 M/UL — SIGNIFICANT CHANGE UP (ref 3.8–5.2)
RBC # FLD: 11.8 % — SIGNIFICANT CHANGE UP (ref 10.3–14.5)
SODIUM SERPL-SCNC: 138 MMOL/L — SIGNIFICANT CHANGE UP (ref 135–145)
WBC # BLD: 7.8 K/UL — SIGNIFICANT CHANGE UP (ref 3.8–10.5)
WBC # FLD AUTO: 7.8 K/UL — SIGNIFICANT CHANGE UP (ref 3.8–10.5)

## 2018-11-13 PROCEDURE — 99231 SBSQ HOSP IP/OBS SF/LOW 25: CPT | Mod: GC

## 2018-11-13 PROCEDURE — 99233 SBSQ HOSP IP/OBS HIGH 50: CPT | Mod: GC

## 2018-11-13 RX ORDER — ACETAMINOPHEN 500 MG
650 TABLET ORAL ONCE
Qty: 0 | Refills: 0 | Status: COMPLETED | OUTPATIENT
Start: 2018-11-13 | End: 2018-11-13

## 2018-11-13 RX ORDER — WARFARIN SODIUM 2.5 MG/1
5 TABLET ORAL ONCE
Qty: 0 | Refills: 0 | Status: COMPLETED | OUTPATIENT
Start: 2018-11-13 | End: 2018-11-13

## 2018-11-13 RX ADMIN — HEPARIN SODIUM 500 UNIT(S)/HR: 5000 INJECTION INTRAVENOUS; SUBCUTANEOUS at 07:36

## 2018-11-13 RX ADMIN — Medication 200 MILLIGRAM(S): at 05:46

## 2018-11-13 RX ADMIN — Medication 5 MILLIGRAM(S): at 05:46

## 2018-11-13 RX ADMIN — Medication 650 MILLIGRAM(S): at 23:18

## 2018-11-13 RX ADMIN — Medication 200 MILLIGRAM(S): at 17:18

## 2018-11-13 RX ADMIN — Medication 81 MILLIGRAM(S): at 11:50

## 2018-11-13 RX ADMIN — WARFARIN SODIUM 5 MILLIGRAM(S): 2.5 TABLET ORAL at 22:33

## 2018-11-13 RX ADMIN — Medication 650 MILLIGRAM(S): at 23:55

## 2018-11-13 RX ADMIN — Medication 100 MILLIGRAM(S): at 17:18

## 2018-11-13 NOTE — PROGRESS NOTE ADULT - SUBJECTIVE AND OBJECTIVE BOX
Vascular Surgery Progress Note      SUBJECTIVE: Patient seen and examined on morning rounds. Denies pain, sensory, or motor deficits. Hand warm.      OBJECTIVE:     ** Vital signs / I&O's **    Vital Signs Last 24 Hrs  T(C): 36.7 (13 Nov 2018 04:47), Max: 36.8 (12 Nov 2018 12:56)  T(F): 98 (13 Nov 2018 04:47), Max: 98.2 (12 Nov 2018 12:56)  HR: 92 (13 Nov 2018 04:47) (75 - 92)  BP: 109/73 (13 Nov 2018 04:47) (103/70 - 109/73)  BP(mean): --  RR: 18 (13 Nov 2018 04:47) (16 - 18)  SpO2: 99% (13 Nov 2018 04:47) (98% - 99%)        ** Physical Exam **  GEN: NAD, alert and oriented x 3  ABD: Soft, non-tender, non-distended  EXT/VASC:                          RUE - No edema/erythema or deformity. Warm, cap refill < 2 sec. Motor and sensory function intact.                                         radial  -  palpable [ x ]       doppler signal [  ]                                       ulnar  -  palpable [  ]       doppler signal [  ]                                       brachial  -  palpable [ x ]       doppler signal [  ]                       LUE  - No edema/erythema or deformity. Warm, cap refill < 2 sec. Motor and sensory function intact.                                        radial  -  palpable [ x ]       doppler signal [  ]                                       ulnar  -  palpable [ x ]       doppler signal [  ]                                       brachial  -  palpable [ x ]       doppler signal [  ]    ** Labs **                          14.3   7.8   )-----------( 290      ( 13 Nov 2018 07:05 )             39.7     13 Nov 2018 07:05    138    |  102    |  11     ----------------------------<  79     3.9     |  22     |  0.68     Ca    9.2        13 Nov 2018 07:05  Phos  3.9       13 Nov 2018 07:05  Mg     1.9       13 Nov 2018 07:05      PT/INR - ( 13 Nov 2018 07:05 )   PT: 27.6 sec;   INR: 2.34 ratio         PTT - ( 13 Nov 2018 07:05 )  PTT:63.5 sec  CAPILLARY BLOOD GLUCOSE      MEDICATIONS  (STANDING):  aspirin  chewable 81 milliGRAM(s) Oral daily  docusate sodium 100 milliGRAM(s) Oral two times a day  heparin  Infusion.  Unit(s)/Hr (11 mL/Hr) IV Continuous <Continuous>  hydroxychloroquine 200 milliGRAM(s) Oral two times a day  predniSONE   Tablet 5 milliGRAM(s) Oral daily  senna 2 Tablet(s) Oral at bedtime  sodium chloride 0.9%. 1000 milliLiter(s) (75 mL/Hr) IV Continuous <Continuous>    MEDICATIONS  (PRN):  heparin  Injectable 5000 Unit(s) IV Push every 6 hours PRN For aPTT less than 40  heparin  Injectable 2500 Unit(s) IV Push every 6 hours PRN For aPTT between 40 - 57  polyethylene glycol 3350 17 Gram(s) Oral daily PRN Constipation

## 2018-11-13 NOTE — PROGRESS NOTE ADULT - PROBLEM SELECTOR PLAN 1
- CTA chest showed no PE; pending CT neck  - Evaluated by Vascular surgery- No surgical intervention at this time  - f/u vascular recs.  - right hand perfusing well.   - if APLS negative, can consider echo for cardiac thrombi (will take 3-5 days for labs to result)  - c/w ASA  - c/w Hep gtt and d/c on therapeutic a/c  - will c/t dose coumadin nightly

## 2018-11-13 NOTE — PROGRESS NOTE ADULT - SUBJECTIVE AND OBJECTIVE BOX
DANILO RUSHING  [id number]    INTERVAL HPI/OVERNIGHT EVENTS:  No complaints on ROS    MEDICATIONS  (STANDING):  aspirin  chewable 81 milliGRAM(s) Oral daily  docusate sodium 100 milliGRAM(s) Oral two times a day  heparin  Infusion.  Unit(s)/Hr (11 mL/Hr) IV Continuous <Continuous>  hydroxychloroquine 200 milliGRAM(s) Oral two times a day  predniSONE   Tablet 5 milliGRAM(s) Oral daily  senna 2 Tablet(s) Oral at bedtime  sodium chloride 0.9%. 1000 milliLiter(s) (75 mL/Hr) IV Continuous <Continuous>    MEDICATIONS  (PRN):  heparin  Injectable 5000 Unit(s) IV Push every 6 hours PRN For aPTT less than 40  heparin  Injectable 2500 Unit(s) IV Push every 6 hours PRN For aPTT between 40 - 57  polyethylene glycol 3350 17 Gram(s) Oral daily PRN Constipation      Allergies    No Known Allergies    Intolerances      Vital Signs Last 24 Hrs  T(C): 37.1 (13 Nov 2018 14:35), Max: 37.1 (13 Nov 2018 14:35)  T(F): 98.7 (13 Nov 2018 14:35), Max: 98.7 (13 Nov 2018 14:35)  HR: 82 (13 Nov 2018 14:35) (82 - 92)  BP: 102/69 (13 Nov 2018 14:35) (102/69 - 109/73)  BP(mean): --  RR: 18 (13 Nov 2018 14:35) (18 - 18)  SpO2: 100% (13 Nov 2018 14:35) (98% - 100%)    Physical Exam:  General: NAD  HEENT: EOMI, MMM  MSK: no synovitis, FROM in all joints  Neuro: AAOx3      LABS:                        14.3   7.8   )-----------( 290      ( 13 Nov 2018 07:05 )             39.7     11-13    138  |  102  |  11  ----------------------------<  79  3.9   |  22  |  0.68    Ca    9.2      13 Nov 2018 07:05  Phos  3.9     11-13  Mg     1.9     11-13      PT/INR - ( 13 Nov 2018 07:05 )   PT: 27.6 sec;   INR: 2.34 ratio         PTT - ( 13 Nov 2018 07:05 )  PTT:63.5 sec  Beta 2 Glycoprotein 1 Antibody Screen (11.10.18 @ 15:12)    Beta 2 Glycoprotein 1 Antibody Screen: Positive    Anticardiolipin Antibody Level, Total (11.10.18 @ 15:12)    Anticardiolipin Antibody Level, Total: Positive: Method: EIA    Lupus Profile (11.10.18 @ 15:12)    DRVVT Inhibitor Screen: 62.4: The presence of direct thrombin inhibitors (such as argatroban,  refludan), or direct  Xa inhibitors (such as fondaparinux)  may cause  false positive results. sec    DRVVT S/C Ratio: LA POS    Silica Clotting Time S/C Ratio: 1.78 ratio    Silica Clotting Time Interpretation: LA POS:            RADIOLOGY & ADDITIONAL TESTS:  < from: CT Angio Chest w/ IV Cont (11.12.18 @ 18:54) >    FINDINGS:    LUNGS AND LARGE AIRWAYS: Patent central airways.  No consolidation or   pulmonary edema.  PLEURA: No pleural effusion.  VESSELS: No pulmonary embolism. Main pulmonary arteries normal in   diameter. No aortic aneurysm or dissection.  HEART: Heart size is normal. No pericardial effusion. No CT evidence of   right heart strain.  MEDIASTINUM AND [place]: No lymphadenopathy.  CHEST WALL AND LOWER NECK: Within normal limits.  VISUALIZED UPPER ABDOMEN: Within normal limits.  BONES: Within normal limits.    IMPRESSION: No pulmonary embolism. No aortic aneurysm or dissection.

## 2018-11-13 NOTE — PROGRESS NOTE ADULT - SUBJECTIVE AND OBJECTIVE BOX
Authored by Dr. Benoit, PGY-1 [phone]  Formerly [facility] B Medicine Team      Patient is a 28y old  Female who presents with a chief complaint of Arterial occlusion (12 Nov 2018 15:12)    SUBJECTIVE / OVERNIGHT EVENTS:    No acute overnight events.   Denies any CP, SOB, N/V, abd pain, F/C.     MEDICATIONS  (STANDING):  aspirin  chewable 81 milliGRAM(s) Oral daily  docusate sodium 100 milliGRAM(s) Oral two times a day  heparin  Infusion.  Unit(s)/Hr (11 mL/Hr) IV Continuous <Continuous>  hydroxychloroquine 200 milliGRAM(s) Oral two times a day  predniSONE   Tablet 5 milliGRAM(s) Oral daily  senna 2 Tablet(s) Oral at bedtime  sodium chloride 0.9%. 1000 milliLiter(s) (75 mL/Hr) IV Continuous <Continuous>    MEDICATIONS  (PRN):  heparin  Injectable 5000 Unit(s) IV Push every 6 hours PRN For aPTT less than 40  heparin  Injectable 2500 Unit(s) IV Push every 6 hours PRN For aPTT between 40 - 57  polyethylene glycol 3350 17 Gram(s) Oral daily PRN Constipation      Vital Signs Last 24 Hrs  T(C): 36.7 (13 Nov 2018 04:47), Max: 36.8 (12 Nov 2018 12:56)  T(F): 98 (13 Nov 2018 04:47), Max: 98.2 (12 Nov 2018 12:56)  HR: 92 (13 Nov 2018 04:47) (75 - 92)  BP: 109/73 (13 Nov 2018 04:47) (103/70 - 109/73)  BP(mean): --  RR: 18 (13 Nov 2018 04:47) (16 - 18)  SpO2: 99% (13 Nov 2018 04:47) (98% - 99%)  CAPILLARY BLOOD GLUCOSE        I&O's Summary      PHYSICAL EXAM  GENERAL: NAD, well-developed  EYES: conjunctiva and sclera clear  NECK: Supple, No JVD  ENT: MMM  CHEST/LUNG: Clear to auscultation bilaterally; No wheeze  HEART: Regular rate and rhythm; No murmurs  ABDOMEN: Soft, Nontender, Nondistended; Bowel sounds present  EXTREMITIES:  2+ Peripheral Pulses, No clubbing, cyanosis, or edema  NEURO: nonfocal, AOx3  PSYCH: calm   SKIN: No rashes or lesions    LABS:                        14.3   7.8   )-----------( 290      ( 13 Nov 2018 07:05 )             39.7     11-13    138  |  102  |  11  ----------------------------<  79  3.9   |  22  |  0.68    Ca    9.2      13 Nov 2018 07:05  Phos  3.9     11-13  Mg     1.9     11-13      PT/INR - ( 13 Nov 2018 07:05 )   PT: 27.6 sec;   INR: 2.34 ratio         PTT - ( 13 Nov 2018 07:05 )  PTT:63.5 sec            RADIOLOGY & ADDITIONAL TESTS:    Imaging Personally Reviewed:  Consultant(s) Notes Reviewed:    Care Discussed with Consultants/Other Providers:

## 2018-11-14 VITALS
OXYGEN SATURATION: 98 % | DIASTOLIC BLOOD PRESSURE: 63 MMHG | RESPIRATION RATE: 18 BRPM | SYSTOLIC BLOOD PRESSURE: 130 MMHG | TEMPERATURE: 99 F | HEART RATE: 96 BPM

## 2018-11-14 LAB
APTT BLD: 70.5 SEC — HIGH (ref 27.5–36.3)
CRYOGLOB SERPL-MCNC: POSITIVE
INR BLD: 2.23 RATIO — HIGH (ref 0.88–1.16)
PROTHROM AB SERPL-ACNC: 26.1 SEC — HIGH (ref 10–12.9)

## 2018-11-14 PROCEDURE — 99239 HOSP IP/OBS DSCHRG MGMT >30: CPT

## 2018-11-14 RX ORDER — WARFARIN SODIUM 2.5 MG/1
1 TABLET ORAL
Qty: 2 | Refills: 0
Start: 2018-11-14 | End: 2018-11-15

## 2018-11-14 RX ORDER — INFLUENZA VIRUS VACCINE 15; 15; 15; 15 UG/.5ML; UG/.5ML; UG/.5ML; UG/.5ML
0.5 SUSPENSION INTRAMUSCULAR ONCE
Qty: 0 | Refills: 0 | Status: COMPLETED | OUTPATIENT
Start: 2018-11-14 | End: 2018-11-14

## 2018-11-14 RX ADMIN — INFLUENZA VIRUS VACCINE 0.5 MILLILITER(S): 15; 15; 15; 15 SUSPENSION INTRAMUSCULAR at 12:57

## 2018-11-14 RX ADMIN — Medication 81 MILLIGRAM(S): at 12:49

## 2018-11-14 RX ADMIN — HEPARIN SODIUM 500 UNIT(S)/HR: 5000 INJECTION INTRAVENOUS; SUBCUTANEOUS at 08:43

## 2018-11-14 RX ADMIN — Medication 5 MILLIGRAM(S): at 05:46

## 2018-11-14 RX ADMIN — Medication 200 MILLIGRAM(S): at 05:46

## 2018-11-14 RX ADMIN — Medication 100 MILLIGRAM(S): at 05:46

## 2018-11-14 NOTE — PROGRESS NOTE ADULT - PROBLEM SELECTOR PLAN 1
- CTA chest/neck showed no evidence of clots  - Evaluated by Vascular surgery- No surgical intervention at this time  - f/u vascular recs.  -- c/w ASA  - once therapeutic INR, can d/c hep gtt and give coumadin  - will c/t dose coumadin nightly

## 2018-11-14 NOTE — PROGRESS NOTE ADULT - PROBLEM SELECTOR PROBLEM 1
Ulnar artery thrombosis

## 2018-11-14 NOTE — PROGRESS NOTE ADULT - ASSESSMENT
27 y/o F w/ SLE admitted for R Ulnar Artery occlusion
27 y/o F w/ SLE admitted for R Ulnar Artery occlusion
28y female with SLE and R ulnar occlusion. Undergoing APLS workup and imaging evaluation for embolic source.     - Exam stable  - Continue heparin gtt  - 3 month course anticoagulation  - Please reconsult as needed    Vascular surgery  Pager [phone]
28y female with SLE and R ulnar occlusion. Undergoing APLS workup and imaging evaluation for embolic source. Will follow. Continue heparin gtt.      Neftaly Flores  R4, General Surgery
29 y/o F w/ SLE admitted for R Ulnar Artery occlusion now on heparin gtt and will transition to coumadin
29 y/o F w/ SLE admitted for R Ulnar Artery occlusion now on heparin gtt and will transition to coumadin
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    cont bridge to therapeutic range (INR 2.5-3.5)  repeat APS labs as outpatient in 12 wks    FU with rheumatology as outpatient     Dr. Daniels  Rheumatology Fellow  Pager: [phone]
27 y/o F w/ SLE admitted for R Ulnar Artery occlusion

## 2018-11-14 NOTE — PROGRESS NOTE ADULT - ATTENDING COMMENTS
Patient seen and examined at bedside. Agree with assessment and plan as stated above. Please call [phone] for any questions or concerns
agree.  seen and examined, excellent appearing, home tmrw on coumadin. Can d/c heparin tmrw after 24 hrs heparin drip while INR therapeutic.  setting up outpatient f/u of INR.
agree. seen and examined, excellent appearing. Hands WWP, asymptomatic. Has f/u w/ rheumatologist for INR check. d/w house rheum, okay to send home w/ current INR (>2 x 2), goal can still be 2.5-3.5. Will adjust coumadin on d/c.   35 min spent coordinating d/c, see d/c sum for details.
Agree.  Transitioning to coumadin, CTA ordered to ensure patency of chest/neck vessels as requested by rheum.   APL labs noted.   Need to ensure coumadin f/u as outpatient. Extensive dietary/etOH counseling.   Would consider statin for primary prevention given burden of inflammation and likely premature atherosclerosis.  Pending INR and results of CTA she may be able to complete this bridge via lovenox at home, mother of 2 kids. Await further results, dispo early-mid this week.
Continue with heparin gttt to bridge to coumadin.   INR remains subtherapeutic.
Bridging to coumadin with heparin.  No vascular intervention.  Rheum consulted, f/u recs.

## 2018-11-14 NOTE — PROGRESS NOTE ADULT - PROBLEM SELECTOR PLAN 3
- DVT PPx: Hep gtt  - Diet: Regular

## 2018-11-14 NOTE — PROGRESS NOTE ADULT - REASON FOR ADMISSION
Arterial occlusion

## 2018-11-14 NOTE — PROGRESS NOTE ADULT - SUBJECTIVE AND OBJECTIVE BOX
Authored by Dr. Benoit, PGY-1 [phone]  [facility] Medicine Team      Patient is a 28y old  Female who presents with a chief complaint of Arterial occlusion (13 Nov 2018 18:39)    SUBJECTIVE / OVERNIGHT EVENTS:    No acute overnight events.   Denies any CP, SOB, N/V, abd pain, F/C.     MEDICATIONS  (STANDING):  aspirin  chewable 81 milliGRAM(s) Oral daily  docusate sodium 100 milliGRAM(s) Oral two times a day  heparin  Infusion.  Unit(s)/Hr (11 mL/Hr) IV Continuous <Continuous>  hydroxychloroquine 200 milliGRAM(s) Oral two times a day  predniSONE   Tablet 5 milliGRAM(s) Oral daily  senna 2 Tablet(s) Oral at bedtime  sodium chloride 0.9%. 1000 milliLiter(s) (75 mL/Hr) IV Continuous <Continuous>    MEDICATIONS  (PRN):  heparin  Injectable 5000 Unit(s) IV Push every 6 hours PRN For aPTT less than 40  heparin  Injectable 2500 Unit(s) IV Push every 6 hours PRN For aPTT between 40 - 57  polyethylene glycol 3350 17 Gram(s) Oral daily PRN Constipation      Vital Signs Last 24 Hrs  T(C): 36.8 (14 Nov 2018 05:15), Max: 37.1 (13 Nov 2018 14:35)  T(F): 98.2 (14 Nov 2018 05:15), Max: 98.8 (13 Nov 2018 22:02)  HR: 90 (14 Nov 2018 05:15) (82 - 90)  BP: 108/71 (14 Nov 2018 05:15) (102/69 - 114/78)  BP(mean): --  RR: 18 (14 Nov 2018 05:15) (18 - 18)  SpO2: 99% (14 Nov 2018 05:15) (98% - 100%)  CAPILLARY BLOOD GLUCOSE        I&O's Summary    13 Nov 2018 07:01  -  14 Nov 2018 07:00  --------------------------------------------------------  IN: 1080 mL / OUT: 4 mL / NET: 1076 mL        PHYSICAL EXAM  GENERAL: NAD, well-developed  EYES: conjunctiva and sclera clear  NECK: Supple, No JVD  ENT: MMM  CHEST/LUNG: Clear to auscultation bilaterally; No wheeze  HEART: Regular rate and rhythm; No murmurs  ABDOMEN: Soft, Nontender, Nondistended; Bowel sounds present  EXTREMITIES:  2+ Peripheral Pulses, No clubbing, cyanosis, or edema  NEURO: nonfocal, AOx3  PSYCH: calm   SKIN: No rashes or lesions    LABS:                        14.3   7.8   )-----------( 290      ( 13 Nov 2018 07:05 )             39.7     11-13    138  |  102  |  11  ----------------------------<  79  3.9   |  22  |  0.68    Ca    9.2      13 Nov 2018 07:05  Phos  3.9     11-13  Mg     1.9     11-13      PT/INR - ( 14 Nov 2018 06:57 )   PT: 26.1 sec;   INR: 2.23 ratio         PTT - ( 14 Nov 2018 06:57 )  PTT:70.5 sec            RADIOLOGY & ADDITIONAL TESTS:    Imaging Personally Reviewed:  Consultant(s) Notes Reviewed:    Care Discussed with Consultants/Other Providers:

## 2018-11-14 NOTE — PROGRESS NOTE ADULT - PROBLEM SELECTOR PLAN 2
- affecting hand and feet, no other organs appear to be affected  - Recently diagnosed 3months ago; c/w Hydroxychloroquine 200mg BID and Prednisone 5mg PO QD  - Rheumatology f/u upon discharge
- affecting hand and feet, no other organs appear to be affected  - Recently diagnosed 3months ago; c/w Hydroxychloroquine 200mg BID and Prednisone 5mg PO QD  - Rheumatology f/u upon discharge  - APLS labs strongly positive
- affecting hand and feet, no other organs appear to be affected  - Recently diagnosed 3months ago; c/w Hydroxychloroquine 200mg BID and Prednisone 5mg PO QD  - Rheumatology f/u upon discharge

## 2018-11-16 ENCOUNTER — APPOINTMENT (OUTPATIENT)
Dept: RHEUMATOLOGY | Facility: CLINIC | Age: 28
End: 2018-11-16
Payer: MEDICAID

## 2018-11-16 VITALS
TEMPERATURE: 98.7 F | WEIGHT: 133 LBS | HEIGHT: 59 IN | DIASTOLIC BLOOD PRESSURE: 72 MMHG | HEART RATE: 94 BPM | BODY MASS INDEX: 26.81 KG/M2 | SYSTOLIC BLOOD PRESSURE: 104 MMHG | OXYGEN SATURATION: 98 %

## 2018-11-16 PROBLEM — L93.0 DISCOID LUPUS ERYTHEMATOSUS: Chronic | Status: ACTIVE | Noted: 2018-11-09

## 2018-11-16 PROBLEM — G43.909 MIGRAINE, UNSPECIFIED, NOT INTRACTABLE, WITHOUT STATUS MIGRAINOSUS: Chronic | Status: ACTIVE | Noted: 2018-11-09

## 2018-11-16 LAB
INR PPP: 2.92 RATIO
PT BLD: 34.4 SEC

## 2018-11-16 PROCEDURE — 99214 OFFICE O/P EST MOD 30 MIN: CPT

## 2018-11-18 LAB
ALBUMIN MFR SERPL ELPH: 55.5 %
ALBUMIN SERPL ELPH-MCNC: 4.6 G/DL
ALBUMIN SERPL-MCNC: 4.4 G/DL
ALBUMIN/GLOB SERPL: 1.3 RATIO
ALP BLD-CCNC: 56 U/L
ALPHA1 GLOB MFR SERPL ELPH: 3.8 %
ALPHA1 GLOB SERPL ELPH-MCNC: 0.3 G/DL
ALPHA2 GLOB MFR SERPL ELPH: 8.2 %
ALPHA2 GLOB SERPL ELPH-MCNC: 0.6 G/DL
ALT SERPL-CCNC: 14 U/L
ANION GAP SERPL CALC-SCNC: 13 MMOL/L
AST SERPL-CCNC: 18 U/L
B-GLOBULIN MFR SERPL ELPH: 12.1 %
B-GLOBULIN SERPL ELPH-MCNC: 1 G/DL
BILIRUB SERPL-MCNC: 0.3 MG/DL
BUN SERPL-MCNC: 16 MG/DL
C3 SERPL-MCNC: 96 MG/DL
C4 SERPL-MCNC: 18 MG/DL
CALCIUM SERPL-MCNC: 9.4 MG/DL
CCP AB SER IA-ACNC: 17 UNITS
CHLORIDE SERPL-SCNC: 103 MMOL/L
CO2 SERPL-SCNC: 23 MMOL/L
CREAT SERPL-MCNC: 0.65 MG/DL
DEPRECATED KAPPA LC FREE/LAMBDA SER: 0.67 RATIO
DSDNA AB SER-ACNC: 135 IU/ML
GAMMA GLOB FLD ELPH-MCNC: 1.6 G/DL
GAMMA GLOB MFR SERPL ELPH: 20.4 %
GLUCOSE SERPL-MCNC: 75 MG/DL
HBV CORE IGG+IGM SER QL: NONREACTIVE
HBV SURFACE AB SER QL: NONREACTIVE
HBV SURFACE AG SER QL: NONREACTIVE
HCV AB SER QL: NONREACTIVE
HCV S/CO RATIO: 0.12 S/CO
IGA SER QL IEP: 345 MG/DL
IGG SER QL IEP: 1541 MG/DL
IGM SER QL IEP: 219 MG/DL
INTERPRETATION SERPL IEP-IMP: NORMAL
KAPPA LC CSF-MCNC: 2.41 MG/DL
KAPPA LC SERPL-MCNC: 1.61 MG/DL
M PROTEIN SPEC IFE-MCNC: NORMAL
M TB IFN-G BLD-IMP: NEGATIVE
POTASSIUM SERPL-SCNC: 4.8 MMOL/L
PROT SERPL-MCNC: 7.9 G/DL
PROT SERPL-MCNC: 7.9 G/DL
PROT SERPL-MCNC: 8 G/DL
QUANTIFERON TB PLUS MITOGEN MINUS NIL: 5.12 IU/ML
QUANTIFERON TB PLUS NIL: 0.02 IU/ML
QUANTIFERON TB PLUS TB1 MINUS NIL: 0 IU/ML
QUANTIFERON TB PLUS TB2 MINUS NIL: 0 IU/ML
RF+CCP IGG SER-IMP: NEGATIVE
RHEUMATOID FACT SER QL: <10 IU/ML
SODIUM SERPL-SCNC: 139 MMOL/L

## 2018-11-20 ENCOUNTER — RX RENEWAL (OUTPATIENT)
Age: 28
End: 2018-11-20

## 2018-11-20 LAB
CRYOCRIT SER SPUN WESTERGREN: 2 MM
CRYOGLOB SERPL-MCNC: POSITIVE

## 2018-11-21 LAB
INR PPP: 3.69 RATIO
PT BLD: 43 SEC

## 2018-11-22 ENCOUNTER — TRANSCRIPTION ENCOUNTER (OUTPATIENT)
Age: 28
End: 2018-11-22

## 2018-11-27 ENCOUNTER — FORM ENCOUNTER (OUTPATIENT)
Age: 28
End: 2018-11-27

## 2018-11-28 ENCOUNTER — OUTPATIENT (OUTPATIENT)
Dept: OUTPATIENT SERVICES | Facility: HOSPITAL | Age: 28
LOS: 1 days | End: 2018-11-28
Payer: MEDICAID

## 2018-11-28 ENCOUNTER — APPOINTMENT (OUTPATIENT)
Dept: CT IMAGING | Facility: IMAGING CENTER | Age: 28
End: 2018-11-28
Payer: MEDICAID

## 2018-11-28 DIAGNOSIS — I77.1 STRICTURE OF ARTERY: ICD-10-CM

## 2018-11-28 LAB
INR PPP: 3.53 RATIO
PT BLD: 41.9 SEC

## 2018-11-28 PROCEDURE — 73206 CT ANGIO UPR EXTRM W/O&W/DYE: CPT | Mod: 26,RT

## 2018-11-28 PROCEDURE — 73206 CT ANGIO UPR EXTRM W/O&W/DYE: CPT

## 2018-12-07 ENCOUNTER — APPOINTMENT (OUTPATIENT)
Dept: RHEUMATOLOGY | Facility: CLINIC | Age: 28
End: 2018-12-07
Payer: MEDICAID

## 2018-12-07 VITALS
BODY MASS INDEX: 26.81 KG/M2 | TEMPERATURE: 98.6 F | SYSTOLIC BLOOD PRESSURE: 102 MMHG | HEIGHT: 59 IN | OXYGEN SATURATION: 98 % | WEIGHT: 133 LBS | HEART RATE: 102 BPM | DIASTOLIC BLOOD PRESSURE: 64 MMHG | RESPIRATION RATE: 14 BRPM

## 2018-12-07 PROCEDURE — 99214 OFFICE O/P EST MOD 30 MIN: CPT

## 2018-12-08 LAB
CRP SERPL-MCNC: <0.1 MG/DL
ERYTHROCYTE [SEDIMENTATION RATE] IN BLOOD BY WESTERGREN METHOD: 4 MM/HR
INR PPP: 2.08 RATIO
PT BLD: 23.8 SEC

## 2018-12-10 LAB
CONFIRM: 39 SEC
DRVVT IMM 1:2 NP PPP: ABNORMAL
DRVVT SCREEN TO CONFIRM RATIO: 1.68 RATIO
SCREEN DRVVT: 80.8 SEC

## 2018-12-11 LAB
B2 GLYCOPROT1 IGG SER-ACNC: <5 SGU
B2 GLYCOPROT1 IGM SER-ACNC: 132.3 SMU
CARDIOLIPIN IGM SER-MCNC: 121.2 MPL
CARDIOLIPIN IGM SER-MCNC: 16 GPL
DEPRECATED CARDIOLIPIN IGA SER: 8.8 APL

## 2018-12-12 LAB — B2 GLYCOPROT1 IGA SERPL IA-ACNC: 15.9 SAU

## 2018-12-13 ENCOUNTER — RX RENEWAL (OUTPATIENT)
Age: 28
End: 2018-12-13

## 2018-12-13 RX ORDER — WARFARIN 4 MG/1
4 TABLET ORAL DAILY
Qty: 30 | Refills: 0 | Status: DISCONTINUED | COMMUNITY
Start: 2018-11-14 | End: 2018-12-13

## 2018-12-19 ENCOUNTER — APPOINTMENT (OUTPATIENT)
Dept: RHEUMATOLOGY | Facility: CLINIC | Age: 28
End: 2018-12-19

## 2018-12-19 ENCOUNTER — RX RENEWAL (OUTPATIENT)
Age: 28
End: 2018-12-19

## 2019-01-09 PROCEDURE — 81001 URINALYSIS AUTO W/SCOPE: CPT

## 2019-01-09 PROCEDURE — 84100 ASSAY OF PHOSPHORUS: CPT

## 2019-01-09 PROCEDURE — 83735 ASSAY OF MAGNESIUM: CPT

## 2019-01-09 PROCEDURE — 86147 CARDIOLIPIN ANTIBODY EA IG: CPT

## 2019-01-09 PROCEDURE — 82570 ASSAY OF URINE CREATININE: CPT

## 2019-01-09 PROCEDURE — 71275 CT ANGIOGRAPHY CHEST: CPT

## 2019-01-09 PROCEDURE — 85610 PROTHROMBIN TIME: CPT

## 2019-01-09 PROCEDURE — 80053 COMPREHEN METABOLIC PANEL: CPT

## 2019-01-09 PROCEDURE — 85027 COMPLETE CBC AUTOMATED: CPT

## 2019-01-09 PROCEDURE — 86901 BLOOD TYPING SEROLOGIC RH(D): CPT

## 2019-01-09 PROCEDURE — 86850 RBC ANTIBODY SCREEN: CPT

## 2019-01-09 PROCEDURE — 82595 ASSAY OF CRYOGLOBULIN: CPT

## 2019-01-09 PROCEDURE — 80048 BASIC METABOLIC PNL TOTAL CA: CPT

## 2019-01-09 PROCEDURE — 86900 BLOOD TYPING SEROLOGIC ABO: CPT

## 2019-01-09 PROCEDURE — 99285 EMERGENCY DEPT VISIT HI MDM: CPT

## 2019-01-09 PROCEDURE — 86225 DNA ANTIBODY NATIVE: CPT

## 2019-01-09 PROCEDURE — 70498 CT ANGIOGRAPHY NECK: CPT

## 2019-01-09 PROCEDURE — 84156 ASSAY OF PROTEIN URINE: CPT

## 2019-01-09 PROCEDURE — 86146 BETA-2 GLYCOPROTEIN ANTIBODY: CPT

## 2019-01-09 PROCEDURE — 90686 IIV4 VACC NO PRSV 0.5 ML IM: CPT

## 2019-01-09 PROCEDURE — 85730 THROMBOPLASTIN TIME PARTIAL: CPT

## 2019-01-11 ENCOUNTER — APPOINTMENT (OUTPATIENT)
Dept: RHEUMATOLOGY | Facility: CLINIC | Age: 29
End: 2019-01-11
Payer: MEDICAID

## 2019-01-11 ENCOUNTER — LABORATORY RESULT (OUTPATIENT)
Age: 29
End: 2019-01-11

## 2019-01-11 VITALS
RESPIRATION RATE: 16 BRPM | TEMPERATURE: 98.7 F | HEART RATE: 99 BPM | SYSTOLIC BLOOD PRESSURE: 110 MMHG | DIASTOLIC BLOOD PRESSURE: 60 MMHG | OXYGEN SATURATION: 98 % | HEIGHT: 59 IN | BODY MASS INDEX: 27.21 KG/M2 | WEIGHT: 135 LBS

## 2019-01-11 PROCEDURE — 99214 OFFICE O/P EST MOD 30 MIN: CPT

## 2019-01-11 RX ORDER — PREDNISONE 20 MG/1
20 TABLET ORAL
Qty: 20 | Refills: 0 | Status: DISCONTINUED | COMMUNITY
Start: 2018-12-07 | End: 2019-01-11

## 2019-01-12 LAB
25(OH)D3 SERPL-MCNC: 14.6 NG/ML
ALBUMIN SERPL ELPH-MCNC: 4.7 G/DL
ALP BLD-CCNC: 53 U/L
ALT SERPL-CCNC: 11 U/L
ANION GAP SERPL CALC-SCNC: 10 MMOL/L
APPEARANCE: CLEAR
AST SERPL-CCNC: 13 U/L
BASOPHILS # BLD AUTO: 0.02 K/UL
BASOPHILS NFR BLD AUTO: 0.2 %
BILIRUB SERPL-MCNC: 0.2 MG/DL
BILIRUBIN URINE: NEGATIVE
BLOOD URINE: NEGATIVE
BUN SERPL-MCNC: 12 MG/DL
C3 SERPL-MCNC: 79 MG/DL
C4 SERPL-MCNC: 13 MG/DL
CALCIUM SERPL-MCNC: 9.4 MG/DL
CHLORIDE SERPL-SCNC: 102 MMOL/L
CO2 SERPL-SCNC: 27 MMOL/L
COLOR: YELLOW
CREAT SERPL-MCNC: 0.71 MG/DL
CREAT SPEC-SCNC: 75 MG/DL
CREAT/PROT UR: 0.1 RATIO
CRP SERPL-MCNC: <0.1 MG/DL
EOSINOPHIL # BLD AUTO: 0.02 K/UL
EOSINOPHIL NFR BLD AUTO: 0.2 %
ERYTHROCYTE [SEDIMENTATION RATE] IN BLOOD BY WESTERGREN METHOD: 3 MM/HR
GLUCOSE QUALITATIVE U: NEGATIVE MG/DL
GLUCOSE SERPL-MCNC: 74 MG/DL
HCT VFR BLD CALC: 37.7 %
HGB BLD-MCNC: 12.8 G/DL
IMM GRANULOCYTES NFR BLD AUTO: 0.1 %
KETONES URINE: NEGATIVE
LEUKOCYTE ESTERASE URINE: ABNORMAL
LYMPHOCYTES # BLD AUTO: 1.68 K/UL
LYMPHOCYTES NFR BLD AUTO: 19.4 %
MAN DIFF?: NORMAL
MCHC RBC-ENTMCNC: 29.7 PG
MCHC RBC-ENTMCNC: 34 GM/DL
MCV RBC AUTO: 87.5 FL
MONOCYTES # BLD AUTO: 0.5 K/UL
MONOCYTES NFR BLD AUTO: 5.8 %
NEUTROPHILS # BLD AUTO: 6.42 K/UL
NEUTROPHILS NFR BLD AUTO: 74.3 %
NITRITE URINE: NEGATIVE
PH URINE: 7
PLATELET # BLD AUTO: 316 K/UL
POTASSIUM SERPL-SCNC: 4.4 MMOL/L
PROT SERPL-MCNC: 7.5 G/DL
PROT UR-MCNC: 10 MG/DL
PROTEIN URINE: NEGATIVE MG/DL
RBC # BLD: 4.31 M/UL
RBC # FLD: 13 %
SODIUM SERPL-SCNC: 139 MMOL/L
SPECIFIC GRAVITY URINE: 1.02
UROBILINOGEN URINE: NEGATIVE MG/DL
WBC # FLD AUTO: 8.65 K/UL

## 2019-01-21 LAB
INR PPP: 2.71 RATIO
PT BLD: 31.3 SEC

## 2019-02-01 ENCOUNTER — RX RENEWAL (OUTPATIENT)
Age: 29
End: 2019-02-01

## 2019-02-06 LAB
INR PPP: 3.69 RATIO
PT BLD: 43.8 SEC

## 2019-02-19 ENCOUNTER — RX RENEWAL (OUTPATIENT)
Age: 29
End: 2019-02-19

## 2019-03-01 ENCOUNTER — TRANSCRIPTION ENCOUNTER (OUTPATIENT)
Age: 29
End: 2019-03-01

## 2019-04-10 NOTE — H&P ADULT - PROBLEM SELECTOR PROBLEM 1
Secondary to severe mitral regurgitation  He is requiring supplemental oxygen and critical care is consulted  IV lasix for diuresis as tolerated--potassium is low at 3.5 with replacement  Supplemental oxygen at 4 liters    Ulnar artery thrombosis

## 2019-04-12 ENCOUNTER — APPOINTMENT (OUTPATIENT)
Dept: RHEUMATOLOGY | Facility: CLINIC | Age: 29
End: 2019-04-12

## 2019-04-26 ENCOUNTER — RX RENEWAL (OUTPATIENT)
Age: 29
End: 2019-04-26

## 2019-04-29 NOTE — H&P ADULT - NSHPSOCIALHISTORY_GEN_ALL_CORE
Denies tobacco, ETOH or recreational drug abuse Denies recreational drug abuse. Last etoh 1 week ago. Intermittently smoked a few cigarettes for a few years, not currently smoking.  Works in doctor's office. Medical Necessity Statement: Based on my medical judgement, Mohs surgery is the most appropriate treatment for this cancer compared to other treatments.

## 2019-05-03 ENCOUNTER — LABORATORY RESULT (OUTPATIENT)
Age: 29
End: 2019-05-03

## 2019-05-03 ENCOUNTER — APPOINTMENT (OUTPATIENT)
Dept: RHEUMATOLOGY | Facility: CLINIC | Age: 29
End: 2019-05-03
Payer: COMMERCIAL

## 2019-05-03 VITALS
HEART RATE: 79 BPM | SYSTOLIC BLOOD PRESSURE: 105 MMHG | HEIGHT: 59 IN | BODY MASS INDEX: 27.21 KG/M2 | TEMPERATURE: 98.1 F | WEIGHT: 135 LBS | DIASTOLIC BLOOD PRESSURE: 61 MMHG | RESPIRATION RATE: 16 BRPM | OXYGEN SATURATION: 98 %

## 2019-05-03 PROCEDURE — 99214 OFFICE O/P EST MOD 30 MIN: CPT

## 2019-05-03 RX ORDER — WARFARIN 2.5 MG/1
2.5 TABLET ORAL DAILY
Qty: 30 | Refills: 0 | Status: DISCONTINUED | COMMUNITY
Start: 2018-11-14 | End: 2019-05-03

## 2019-05-03 RX ORDER — PREDNISONE 5 MG/1
5 TABLET ORAL
Qty: 30 | Refills: 3 | Status: DISCONTINUED | COMMUNITY
Start: 2018-10-25 | End: 2019-05-03

## 2019-05-03 RX ORDER — WARFARIN 3 MG/1
3 TABLET ORAL
Qty: 60 | Refills: 11 | Status: DISCONTINUED | COMMUNITY
Start: 2018-11-21 | End: 2019-05-03

## 2019-05-03 RX ORDER — ASPIRIN ENTERIC COATED TABLETS 81 MG 81 MG/1
81 TABLET, DELAYED RELEASE ORAL
Qty: 30 | Refills: 3 | Status: DISCONTINUED | COMMUNITY
Start: 2018-11-09 | End: 2019-05-03

## 2019-05-05 LAB
25(OH)D3 SERPL-MCNC: 25.5 NG/ML
ALBUMIN SERPL ELPH-MCNC: 4.8 G/DL
ALP BLD-CCNC: 58 U/L
ALT SERPL-CCNC: 8 U/L
ANION GAP SERPL CALC-SCNC: 15 MMOL/L
APPEARANCE: CLEAR
AST SERPL-CCNC: 14 U/L
BASOPHILS # BLD AUTO: 0.05 K/UL
BASOPHILS NFR BLD AUTO: 0.9 %
BILIRUB SERPL-MCNC: 0.4 MG/DL
BILIRUBIN URINE: NEGATIVE
BLOOD URINE: NEGATIVE
BUN SERPL-MCNC: 9 MG/DL
C3 SERPL-MCNC: 75 MG/DL
C4 SERPL-MCNC: 12 MG/DL
CALCIUM SERPL-MCNC: 9.4 MG/DL
CHLORIDE SERPL-SCNC: 101 MMOL/L
CO2 SERPL-SCNC: 26 MMOL/L
COLOR: NORMAL
CREAT SERPL-MCNC: 0.67 MG/DL
CREAT SPEC-SCNC: 61 MG/DL
CREAT/PROT UR: 0.1 RATIO
CRP SERPL-MCNC: <0.1 MG/DL
ENA RNP AB SER IA-ACNC: <0.2 AL
ENA SM AB SER IA-ACNC: <0.2 AL
ENA SS-A AB SER IA-ACNC: 0.3 AL
ENA SS-B AB SER IA-ACNC: <0.2 AL
EOSINOPHIL # BLD AUTO: 0.07 K/UL
EOSINOPHIL NFR BLD AUTO: 1.3 %
ERYTHROCYTE [SEDIMENTATION RATE] IN BLOOD BY WESTERGREN METHOD: 6 MM/HR
FOLATE SERPL-MCNC: 15.7 NG/ML
GLUCOSE QUALITATIVE U: NEGATIVE
GLUCOSE SERPL-MCNC: 74 MG/DL
HCT VFR BLD CALC: 38.5 %
HGB BLD-MCNC: 13.3 G/DL
IMM GRANULOCYTES NFR BLD AUTO: 0.2 %
IRON SATN MFR SERPL: 13 %
IRON SERPL-MCNC: 47 UG/DL
KETONES URINE: NEGATIVE
LEUKOCYTE ESTERASE URINE: NEGATIVE
LYMPHOCYTES # BLD AUTO: 1.62 K/UL
LYMPHOCYTES NFR BLD AUTO: 28.9 %
MAN DIFF?: NORMAL
MCHC RBC-ENTMCNC: 29.6 PG
MCHC RBC-ENTMCNC: 34.5 GM/DL
MCV RBC AUTO: 85.6 FL
MONOCYTES # BLD AUTO: 0.44 K/UL
MONOCYTES NFR BLD AUTO: 7.9 %
NEUTROPHILS # BLD AUTO: 3.41 K/UL
NEUTROPHILS NFR BLD AUTO: 60.8 %
NITRITE URINE: NEGATIVE
PH URINE: 6.5
PLATELET # BLD AUTO: 337 K/UL
POTASSIUM SERPL-SCNC: 4.1 MMOL/L
PROT SERPL-MCNC: 7.5 G/DL
PROT UR-MCNC: 5 MG/DL
PROTEIN URINE: NORMAL
RBC # BLD: 4.5 M/UL
RBC # FLD: 12.5 %
SODIUM SERPL-SCNC: 142 MMOL/L
SPECIFIC GRAVITY URINE: 1.01
TIBC SERPL-MCNC: 360 UG/DL
TSH SERPL-ACNC: 1.73 UIU/ML
UIBC SERPL-MCNC: 313 UG/DL
UROBILINOGEN URINE: NORMAL
VIT B12 SERPL-MCNC: 416 PG/ML
WBC # FLD AUTO: 5.6 K/UL

## 2019-05-06 LAB
B2 GLYCOPROT1 AB SER QL: POSITIVE
CONFIRM: 27.8 SEC
DRVVT IMM 1:2 NP PPP: ABNORMAL
DRVVT SCREEN TO CONFIRM RATIO: 1.75 RATIO
SCREEN DRVVT: 58.7 SEC

## 2019-05-07 LAB
CARDIOLIPIN AB SER IA-ACNC: POSITIVE
DSDNA AB SER-ACNC: 118 IU/ML

## 2019-05-14 ENCOUNTER — FORM ENCOUNTER (OUTPATIENT)
Age: 29
End: 2019-05-14

## 2019-05-15 ENCOUNTER — OUTPATIENT (OUTPATIENT)
Dept: OUTPATIENT SERVICES | Facility: HOSPITAL | Age: 29
LOS: 1 days | End: 2019-05-15
Payer: COMMERCIAL

## 2019-05-15 ENCOUNTER — RX RENEWAL (OUTPATIENT)
Age: 29
End: 2019-05-15

## 2019-05-15 ENCOUNTER — APPOINTMENT (OUTPATIENT)
Dept: ULTRASOUND IMAGING | Facility: HOSPITAL | Age: 29
End: 2019-05-15
Payer: COMMERCIAL

## 2019-05-15 DIAGNOSIS — Z00.00 ENCOUNTER FOR GENERAL ADULT MEDICAL EXAMINATION WITHOUT ABNORMAL FINDINGS: ICD-10-CM

## 2019-05-15 DIAGNOSIS — I70.90 UNSPECIFIED ATHEROSCLEROSIS: ICD-10-CM

## 2019-05-15 PROCEDURE — 93930 UPPER EXTREMITY STUDY: CPT

## 2019-05-15 PROCEDURE — 93930 UPPER EXTREMITY STUDY: CPT | Mod: 26

## 2019-05-16 ENCOUNTER — RESULT REVIEW (OUTPATIENT)
Age: 29
End: 2019-05-16

## 2019-05-23 ENCOUNTER — EMERGENCY (EMERGENCY)
Facility: HOSPITAL | Age: 29
LOS: 1 days | Discharge: HOME | End: 2019-05-23
Attending: EMERGENCY MEDICINE
Payer: COMMERCIAL

## 2019-05-23 VITALS
TEMPERATURE: 98 F | HEART RATE: 79 BPM | HEIGHT: 59 IN | RESPIRATION RATE: 18 BRPM | WEIGHT: 138.01 LBS | OXYGEN SATURATION: 98 % | SYSTOLIC BLOOD PRESSURE: 114 MMHG | DIASTOLIC BLOOD PRESSURE: 79 MMHG

## 2019-05-23 VITALS
OXYGEN SATURATION: 98 % | RESPIRATION RATE: 16 BRPM | DIASTOLIC BLOOD PRESSURE: 74 MMHG | SYSTOLIC BLOOD PRESSURE: 108 MMHG | HEART RATE: 76 BPM | TEMPERATURE: 99 F

## 2019-05-23 PROCEDURE — 93010 ELECTROCARDIOGRAM REPORT: CPT

## 2019-05-23 PROCEDURE — 93005 ELECTROCARDIOGRAM TRACING: CPT

## 2019-05-23 PROCEDURE — 71046 X-RAY EXAM CHEST 2 VIEWS: CPT

## 2019-05-23 PROCEDURE — 93308 TTE F-UP OR LMTD: CPT | Mod: 26

## 2019-05-23 PROCEDURE — 71046 X-RAY EXAM CHEST 2 VIEWS: CPT | Mod: 26

## 2019-05-23 PROCEDURE — 99284 EMERGENCY DEPT VISIT MOD MDM: CPT | Mod: 25

## 2019-05-23 PROCEDURE — 93308 TTE F-UP OR LMTD: CPT

## 2019-05-23 NOTE — ED PROVIDER NOTE - NSFOLLOWUPINSTRUCTIONS_ED_ALL_ED_FT
See your Primary Doctor and Rheumatologist this week for follow up.    Continue all current medications as previously directed.    Stay well hydrated, eat regular healthy meals, get plenty of rest.    Seek immediate medical care for new/worsening symptoms/concerns.

## 2019-05-23 NOTE — ED PROVIDER NOTE - CLINICAL SUMMARY MEDICAL DECISION MAKING FREE TEXT BOX
------------ATTENDING NOTE------------  pt w/ mother c/o several days of intermittent chest pain, describing random occurrences of mild central dull ache and feeling needing to catch breath, lasting 1 to 2 minutes, resolve w/o complaint, no palpitations or near/syncope, no exertional symptoms, no recent fevers/illness, asymptomatic ED course, has known DVT but very low suspicion for PE by Hx/PE/results, complicated by Lupus but low suspicion for pericarditis / pleuritis, nml VS, equal distal pulses, soft benign abd, in depth dw all about ddx, tx, freeman, continued close outpt fu.  - Jet Pineda MD   --------------------------------------------------------

## 2019-05-23 NOTE — ED ADULT NURSE NOTE - OBJECTIVE STATEMENT
27 y/o female came with mother seen in [place]/ Peds ER, pt c/o intermittent dull chest pain x several days, and feeling a need to catch her breath, lasting 1 to 2 minutes, that resolved.  No recent travel, no fever, no chills, no palpitations, no dizziness, no n/v/d.  No acute respiratory distress noted.  Extremities mobile.

## 2019-05-24 ENCOUNTER — RX RENEWAL (OUTPATIENT)
Age: 29
End: 2019-05-24

## 2019-05-30 ENCOUNTER — APPOINTMENT (OUTPATIENT)
Dept: VASCULAR SURGERY | Facility: CLINIC | Age: 29
End: 2019-05-30
Payer: COMMERCIAL

## 2019-05-30 VITALS
HEART RATE: 76 BPM | SYSTOLIC BLOOD PRESSURE: 107 MMHG | TEMPERATURE: 98 F | DIASTOLIC BLOOD PRESSURE: 73 MMHG | WEIGHT: 135 LBS | HEIGHT: 59 IN | BODY MASS INDEX: 27.21 KG/M2

## 2019-05-30 PROCEDURE — 93923 UPR/LXTR ART STDY 3+ LVLS: CPT

## 2019-05-30 PROCEDURE — 99204 OFFICE O/P NEW MOD 45 MIN: CPT

## 2019-05-30 RX ORDER — B-COMPLEX WITH VITAMIN C
TABLET ORAL
Refills: 0 | Status: DISCONTINUED | COMMUNITY
End: 2019-05-30

## 2019-05-30 RX ORDER — OMEPRAZOLE 20 MG/1
20 CAPSULE, DELAYED RELEASE ORAL
Qty: 15 | Refills: 0 | Status: DISCONTINUED | COMMUNITY
Start: 2018-09-18 | End: 2019-05-30

## 2019-05-30 NOTE — PHYSICAL EXAM
[JVD] : no jugular venous distention  [Normal Breath Sounds] : Normal breath sounds [2+] : left 2+ [Ankle Swelling (On Exam)] : not present [Varicose Veins Of Lower Extremities] : not present [] : not present [Abdomen Tenderness] : ~T ~M No abdominal tenderness [No Rash or Lesion] : No rash or lesion [Alert] : alert [Calm] : calm [de-identified] : appears well [de-identified] : sensorimotor intact

## 2019-05-30 NOTE — ASSESSMENT
[FreeTextEntry1] : In the office patient underwent arterial Dopplers of her upper extremities which are within normal limits. At this time I agree with starting the patient on Xarelto, as patient must be hypercoagulable. No other vascular intervention is necessary. I have warned the patient against  using oral contraceptives\par She may followup as needed

## 2019-05-30 NOTE — HISTORY OF PRESENT ILLNESS
[FreeTextEntry1] : 28-year-old female with a history of SLE presents with a six-month history of occlusion of her right ulnar artery. Patient states that initially she had pain in the right arm and hand however, over time the pain has improved. She states that at times the right hand may feel tired. No ulcers or numbness or tingling in the right hand. She is here for evaluation.

## 2019-06-11 ENCOUNTER — RX RENEWAL (OUTPATIENT)
Age: 29
End: 2019-06-11

## 2019-06-11 RX ORDER — PREDNISONE 20 MG/1
20 TABLET ORAL
Qty: 30 | Refills: 0 | Status: DISCONTINUED | COMMUNITY
Start: 2019-04-26 | End: 2019-06-11

## 2019-06-12 ENCOUNTER — APPOINTMENT (OUTPATIENT)
Dept: RHEUMATOLOGY | Facility: CLINIC | Age: 29
End: 2019-06-12
Payer: COMMERCIAL

## 2019-06-12 VITALS
SYSTOLIC BLOOD PRESSURE: 100 MMHG | HEART RATE: 82 BPM | HEIGHT: 59 IN | OXYGEN SATURATION: 98 % | BODY MASS INDEX: 27.62 KG/M2 | WEIGHT: 137 LBS | DIASTOLIC BLOOD PRESSURE: 69 MMHG | TEMPERATURE: 99 F

## 2019-06-12 PROCEDURE — 99214 OFFICE O/P EST MOD 30 MIN: CPT

## 2019-06-13 LAB
BASOPHILS # BLD AUTO: 0.04 K/UL
BASOPHILS NFR BLD AUTO: 0.6 %
EOSINOPHIL # BLD AUTO: 0.07 K/UL
EOSINOPHIL NFR BLD AUTO: 1 %
HCT VFR BLD CALC: 35.2 %
HGB BLD-MCNC: 12.1 G/DL
IMM GRANULOCYTES NFR BLD AUTO: 0.3 %
LYMPHOCYTES # BLD AUTO: 1.78 K/UL
LYMPHOCYTES NFR BLD AUTO: 24.6 %
MAN DIFF?: NORMAL
MCHC RBC-ENTMCNC: 30.1 PG
MCHC RBC-ENTMCNC: 34.4 GM/DL
MCV RBC AUTO: 87.6 FL
MONOCYTES # BLD AUTO: 0.55 K/UL
MONOCYTES NFR BLD AUTO: 7.6 %
NEUTROPHILS # BLD AUTO: 4.77 K/UL
NEUTROPHILS NFR BLD AUTO: 65.9 %
PLATELET # BLD AUTO: 309 K/UL
RBC # BLD: 4.02 M/UL
RBC # FLD: 12.7 %
WBC # FLD AUTO: 7.23 K/UL

## 2019-06-28 RX ORDER — RIVAROXABAN 15 MG-20MG
15 & 20 KIT ORAL
Qty: 1 | Refills: 0 | Status: DISCONTINUED | COMMUNITY
Start: 2019-05-24 | End: 2019-06-28

## 2019-07-02 NOTE — PROVIDER CONTACT NOTE (CRITICAL VALUE NOTIFICATION) - NS PROVIDER READ BACK TO LAB
----- Message from Leroy Ryder MD sent at 7/2/2019  2:10 PM CDT -----  Please let her know that everythhing looks good on her mri. We just need to get a new xr in a month to make sure things have healed. I don't see any pathologic area that is concerning right now.   Thanks yes

## 2019-07-03 ENCOUNTER — RX RENEWAL (OUTPATIENT)
Age: 29
End: 2019-07-03

## 2019-07-03 RX ORDER — RIVAROXABAN 20 MG/1
20 TABLET, FILM COATED ORAL
Qty: 90 | Refills: 3 | Status: DISCONTINUED | COMMUNITY
Start: 2019-05-24 | End: 2019-07-03

## 2019-07-30 ENCOUNTER — APPOINTMENT (OUTPATIENT)
Dept: RHEUMATOLOGY | Facility: CLINIC | Age: 29
End: 2019-07-30
Payer: COMMERCIAL

## 2019-07-30 VITALS
DIASTOLIC BLOOD PRESSURE: 77 MMHG | BODY MASS INDEX: 28.22 KG/M2 | SYSTOLIC BLOOD PRESSURE: 115 MMHG | WEIGHT: 140 LBS | HEIGHT: 59 IN | OXYGEN SATURATION: 98 % | TEMPERATURE: 98.5 F | HEART RATE: 87 BPM

## 2019-07-30 PROCEDURE — 99214 OFFICE O/P EST MOD 30 MIN: CPT

## 2019-07-31 LAB
C3 SERPL-MCNC: 65 MG/DL
C4 SERPL-MCNC: 12 MG/DL
CRP SERPL-MCNC: <0.1 MG/DL
DSDNA AB SER-ACNC: 103 IU/ML
ERYTHROCYTE [SEDIMENTATION RATE] IN BLOOD BY WESTERGREN METHOD: 3 MM/HR

## 2019-08-06 ENCOUNTER — RX RENEWAL (OUTPATIENT)
Age: 29
End: 2019-08-06

## 2019-08-13 LAB
INR PPP: 2.06 RATIO
PT BLD: 23.8 SEC

## 2019-08-30 ENCOUNTER — RX RENEWAL (OUTPATIENT)
Age: 29
End: 2019-08-30

## 2019-10-03 ENCOUNTER — APPOINTMENT (OUTPATIENT)
Dept: RHEUMATOLOGY | Facility: CLINIC | Age: 29
End: 2019-10-03
Payer: COMMERCIAL

## 2019-10-03 ENCOUNTER — LABORATORY RESULT (OUTPATIENT)
Age: 29
End: 2019-10-03

## 2019-10-03 VITALS
WEIGHT: 136 LBS | OXYGEN SATURATION: 98 % | HEART RATE: 83 BPM | TEMPERATURE: 98.1 F | DIASTOLIC BLOOD PRESSURE: 81 MMHG | BODY MASS INDEX: 27.47 KG/M2 | SYSTOLIC BLOOD PRESSURE: 114 MMHG

## 2019-10-03 PROCEDURE — 99214 OFFICE O/P EST MOD 30 MIN: CPT

## 2019-10-03 NOTE — ASSESSMENT
[FreeTextEntry1] : 28 year-old female with red rash over feet and hand with severe pain - appearance of vasculitis\par Labs with positive DAMON and DSDNA, triple positive APS\par \par 1. SLE - on Plaquenil 200 mg BID - no side effects -no rashes or active symptoms - reports increase in hair loss - no bald spots\par 2. 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

## 2019-10-03 NOTE — HISTORY OF PRESENT ILLNESS
[___ Month(s) Ago] : [unfilled] month(s) ago [Skin Lesions] : skin lesions [FreeTextEntry1] : US with no complete ulnar artery occlusion - now on Coumadin - unknown INR - has mild flares over the arms and hands at least twice a week  with a burning sensation over the 3rd and 4th digit - then resolves \par \par Denies any fevers, chill, rashes, weight loss,fatigue,  hair loss, joint pains, dry eyes or mouth, mouth sores, Raynaud's. chest pain or SOB, GI or , numbness/tingling\par

## 2019-10-03 NOTE — PHYSICAL EXAM
[General Appearance - Alert] : alert [General Appearance - In No Acute Distress] : in no acute distress [Outer Ear] : the ears and nose were normal in appearance [Neck Appearance] : the appearance of the neck was normal [Neck Cervical Mass (___cm)] : no neck mass was observed [Oropharynx] : the oropharynx was normal [Jugular Venous Distention Increased] : there was no jugular-venous distention [Thyroid Diffuse Enlargement] : the thyroid was not enlarged [Thyroid Nodule] : there were no palpable thyroid nodules [Auscultation Breath Sounds / Voice Sounds] : lungs were clear to auscultation bilaterally [Heart Rate And Rhythm] : heart rate was normal and rhythm regular [Heart Sounds] : normal S1 and S2 [Heart Sounds Gallop] : no gallops [Murmurs] : no murmurs [Heart Sounds Pericardial Friction Rub] : no pericardial rub [Bowel Sounds] : normal bowel sounds [Abdomen Soft] : soft [Abdomen Tenderness] : non-tender [Abdomen Mass (___ Cm)] : no abdominal mass palpated [Cervical Lymph Nodes Enlarged Posterior Bilaterally] : posterior cervical [Cervical Lymph Nodes Enlarged Anterior Bilaterally] : anterior cervical [No CVA Tenderness] : no ~M costovertebral angle tenderness [Supraclavicular Lymph Nodes Enlarged Bilaterally] : supraclavicular [No Spinal Tenderness] : no spinal tenderness [Abnormal Walk] : normal gait [Musculoskeletal - Swelling] : no joint swelling seen [Nail Clubbing] : no clubbing  or cyanosis of the fingernails [Motor Tone] : muscle strength and tone were normal [Skin Color & Pigmentation] : normal skin color and pigmentation [Skin Turgor] : normal skin turgor [] : no rash [Oriented To Time, Place, And Person] : oriented to person, place, and time [Impaired Insight] : insight and judgment were intact [Affect] : the affect was normal [FreeTextEntry1] : unable to obtain ulnar pulse over the right wrist

## 2019-10-04 LAB
25(OH)D3 SERPL-MCNC: 22 NG/ML
ALBUMIN SERPL ELPH-MCNC: 4.7 G/DL
ALP BLD-CCNC: 64 U/L
ALT SERPL-CCNC: 7 U/L
ANION GAP SERPL CALC-SCNC: 12 MMOL/L
APPEARANCE: CLEAR
AST SERPL-CCNC: 16 U/L
BASOPHILS # BLD AUTO: 0.07 K/UL
BASOPHILS NFR BLD AUTO: 1 %
BILIRUB SERPL-MCNC: 0.4 MG/DL
BILIRUBIN URINE: ABNORMAL
BLOOD URINE: NEGATIVE
BUN SERPL-MCNC: 12 MG/DL
C3 SERPL-MCNC: 72 MG/DL
C4 SERPL-MCNC: 13 MG/DL
CALCIUM SERPL-MCNC: 9.4 MG/DL
CHLORIDE SERPL-SCNC: 104 MMOL/L
CK SERPL-CCNC: 71 U/L
CO2 SERPL-SCNC: 26 MMOL/L
COLOR: YELLOW
CREAT SERPL-MCNC: 0.71 MG/DL
CREAT SPEC-SCNC: 245 MG/DL
CREAT/PROT UR: 0.1 RATIO
CRP SERPL-MCNC: <0.1 MG/DL
DSDNA AB SER-ACNC: 105 IU/ML
EOSINOPHIL # BLD AUTO: 0.05 K/UL
EOSINOPHIL NFR BLD AUTO: 0.7 %
ERYTHROCYTE [SEDIMENTATION RATE] IN BLOOD BY WESTERGREN METHOD: 5 MM/HR
GLUCOSE QUALITATIVE U: NEGATIVE
GLUCOSE SERPL-MCNC: 103 MG/DL
HCT VFR BLD CALC: 39.9 %
HGB BLD-MCNC: 13.5 G/DL
IMM GRANULOCYTES NFR BLD AUTO: 0.3 %
KETONES URINE: NORMAL
LEUKOCYTE ESTERASE URINE: NEGATIVE
LYMPHOCYTES # BLD AUTO: 1.76 K/UL
LYMPHOCYTES NFR BLD AUTO: 24.4 %
MAN DIFF?: NORMAL
MCHC RBC-ENTMCNC: 29 PG
MCHC RBC-ENTMCNC: 33.8 GM/DL
MCV RBC AUTO: 85.6 FL
MONOCYTES # BLD AUTO: 0.61 K/UL
MONOCYTES NFR BLD AUTO: 8.5 %
MYOGLOBIN SERPL-MCNC: <21 NG/ML
NEUTROPHILS # BLD AUTO: 4.7 K/UL
NEUTROPHILS NFR BLD AUTO: 65.1 %
NITRITE URINE: NEGATIVE
PH URINE: 6
PLATELET # BLD AUTO: 327 K/UL
POTASSIUM SERPL-SCNC: 3.9 MMOL/L
PROT SERPL-MCNC: 7.3 G/DL
PROT UR-MCNC: 18 MG/DL
PROTEIN URINE: ABNORMAL
RBC # BLD: 4.66 M/UL
RBC # FLD: 12.9 %
SODIUM SERPL-SCNC: 142 MMOL/L
SPECIFIC GRAVITY URINE: >=1.03
UROBILINOGEN URINE: NORMAL
WBC # FLD AUTO: 7.21 K/UL

## 2019-10-06 LAB — ALDOLASE SERPL-CCNC: 2.7 U/L

## 2019-10-21 LAB
INR PPP: 1.53 RATIO
PT BLD: 17.7 SEC

## 2019-10-23 ENCOUNTER — APPOINTMENT (OUTPATIENT)
Dept: RHEUMATOLOGY | Facility: CLINIC | Age: 29
End: 2019-10-23
Payer: COMMERCIAL

## 2019-10-23 VITALS
BODY MASS INDEX: 27.87 KG/M2 | OXYGEN SATURATION: 98 % | TEMPERATURE: 98.6 F | SYSTOLIC BLOOD PRESSURE: 98 MMHG | HEART RATE: 78 BPM | DIASTOLIC BLOOD PRESSURE: 69 MMHG | WEIGHT: 138 LBS

## 2019-10-23 PROCEDURE — 99214 OFFICE O/P EST MOD 30 MIN: CPT

## 2019-11-19 LAB
INR PPP: 1.69 RATIO
PT BLD: 19.7 SEC

## 2019-12-04 ENCOUNTER — APPOINTMENT (OUTPATIENT)
Dept: RHEUMATOLOGY | Facility: CLINIC | Age: 29
End: 2019-12-04
Payer: COMMERCIAL

## 2019-12-04 VITALS
OXYGEN SATURATION: 98 % | HEIGHT: 59 IN | BODY MASS INDEX: 27.82 KG/M2 | SYSTOLIC BLOOD PRESSURE: 113 MMHG | WEIGHT: 138 LBS | HEART RATE: 78 BPM | TEMPERATURE: 98.5 F | DIASTOLIC BLOOD PRESSURE: 73 MMHG

## 2019-12-04 PROCEDURE — 99214 OFFICE O/P EST MOD 30 MIN: CPT

## 2019-12-05 ENCOUNTER — RX RENEWAL (OUTPATIENT)
Age: 29
End: 2019-12-05

## 2019-12-05 LAB
ALBUMIN SERPL ELPH-MCNC: 4.5 G/DL
ALP BLD-CCNC: 58 U/L
ALT SERPL-CCNC: 9 U/L
ANION GAP SERPL CALC-SCNC: 16 MMOL/L
AST SERPL-CCNC: 14 U/L
BASOPHILS # BLD AUTO: 0.05 K/UL
BASOPHILS NFR BLD AUTO: 0.7 %
BILIRUB SERPL-MCNC: 0.4 MG/DL
BUN SERPL-MCNC: 9 MG/DL
C3 SERPL-MCNC: 76 MG/DL
C4 SERPL-MCNC: 15 MG/DL
CALCIUM SERPL-MCNC: 9.2 MG/DL
CHLORIDE SERPL-SCNC: 103 MMOL/L
CO2 SERPL-SCNC: 22 MMOL/L
CREAT SERPL-MCNC: 0.63 MG/DL
CRP SERPL-MCNC: <0.1 MG/DL
DSDNA AB SER-ACNC: 108 IU/ML
EOSINOPHIL # BLD AUTO: 0.08 K/UL
EOSINOPHIL NFR BLD AUTO: 1.1 %
FOLATE SERPL-MCNC: 16.3 NG/ML
GLUCOSE SERPL-MCNC: 139 MG/DL
HCT VFR BLD CALC: 38.6 %
HGB BLD-MCNC: 13.1 G/DL
IMM GRANULOCYTES NFR BLD AUTO: 0.1 %
IRON SATN MFR SERPL: 15 %
IRON SERPL-MCNC: 61 UG/DL
LYMPHOCYTES # BLD AUTO: 2.19 K/UL
LYMPHOCYTES NFR BLD AUTO: 31 %
MAN DIFF?: NORMAL
MCHC RBC-ENTMCNC: 28.9 PG
MCHC RBC-ENTMCNC: 33.9 GM/DL
MCV RBC AUTO: 85.2 FL
MONOCYTES # BLD AUTO: 0.45 K/UL
MONOCYTES NFR BLD AUTO: 6.4 %
NEUTROPHILS # BLD AUTO: 4.28 K/UL
NEUTROPHILS NFR BLD AUTO: 60.7 %
PLATELET # BLD AUTO: 330 K/UL
POTASSIUM SERPL-SCNC: 4.1 MMOL/L
PROT SERPL-MCNC: 7.2 G/DL
RBC # BLD: 4.53 M/UL
RBC # FLD: 12.8 %
SODIUM SERPL-SCNC: 141 MMOL/L
TIBC SERPL-MCNC: 414 UG/DL
TSH SERPL-ACNC: 1.82 UIU/ML
UIBC SERPL-MCNC: 353 UG/DL
VIT B12 SERPL-MCNC: 385 PG/ML
WBC # FLD AUTO: 7.06 K/UL

## 2019-12-06 ENCOUNTER — EMERGENCY (EMERGENCY)
Facility: HOSPITAL | Age: 29
LOS: 1 days | Discharge: HOME | End: 2019-12-06
Attending: EMERGENCY MEDICINE
Payer: COMMERCIAL

## 2019-12-06 VITALS
RESPIRATION RATE: 17 BRPM | SYSTOLIC BLOOD PRESSURE: 101 MMHG | OXYGEN SATURATION: 99 % | TEMPERATURE: 99 F | HEART RATE: 100 BPM | DIASTOLIC BLOOD PRESSURE: 67 MMHG

## 2019-12-06 VITALS
SYSTOLIC BLOOD PRESSURE: 119 MMHG | RESPIRATION RATE: 16 BRPM | DIASTOLIC BLOOD PRESSURE: 86 MMHG | OXYGEN SATURATION: 99 % | TEMPERATURE: 99 F | WEIGHT: 139.99 LBS | HEIGHT: 59 IN | HEART RATE: 102 BPM

## 2019-12-06 LAB
ALBUMIN SERPL ELPH-MCNC: 4.4 G/DL — SIGNIFICANT CHANGE UP (ref 3.3–5)
ALP SERPL-CCNC: 58 U/L — SIGNIFICANT CHANGE UP (ref 40–120)
ALT FLD-CCNC: 9 U/L — LOW (ref 10–45)
ANION GAP SERPL CALC-SCNC: 9 MMOL/L — SIGNIFICANT CHANGE UP (ref 5–17)
AST SERPL-CCNC: 12 U/L — SIGNIFICANT CHANGE UP (ref 10–40)
BASOPHILS # BLD AUTO: 0.03 K/UL — SIGNIFICANT CHANGE UP (ref 0–0.2)
BASOPHILS NFR BLD AUTO: 0.3 % — SIGNIFICANT CHANGE UP (ref 0–2)
BILIRUB SERPL-MCNC: 0.5 MG/DL — SIGNIFICANT CHANGE UP (ref 0.2–1.2)
BUN SERPL-MCNC: 8 MG/DL — SIGNIFICANT CHANGE UP (ref 7–23)
CALCIUM SERPL-MCNC: 9.1 MG/DL — SIGNIFICANT CHANGE UP (ref 8.4–10.5)
CHLORIDE SERPL-SCNC: 102 MMOL/L — SIGNIFICANT CHANGE UP (ref 96–108)
CK SERPL-CCNC: 68 U/L — SIGNIFICANT CHANGE UP (ref 25–170)
CO2 SERPL-SCNC: 26 MMOL/L — SIGNIFICANT CHANGE UP (ref 22–31)
CREAT SERPL-MCNC: 0.7 MG/DL — SIGNIFICANT CHANGE UP (ref 0.5–1.3)
EOSINOPHIL # BLD AUTO: 0 K/UL — SIGNIFICANT CHANGE UP (ref 0–0.5)
EOSINOPHIL NFR BLD AUTO: 0 % — SIGNIFICANT CHANGE UP (ref 0–6)
FLU A RESULT: SIGNIFICANT CHANGE UP
FLU A RESULT: SIGNIFICANT CHANGE UP
FLUAV AG NPH QL: SIGNIFICANT CHANGE UP
FLUBV AG NPH QL: SIGNIFICANT CHANGE UP
GLUCOSE SERPL-MCNC: 106 MG/DL — HIGH (ref 70–99)
HCG SERPL-ACNC: <2 MIU/ML — SIGNIFICANT CHANGE UP
HCT VFR BLD CALC: 36.6 % — SIGNIFICANT CHANGE UP (ref 34.5–45)
HGB BLD-MCNC: 12.8 G/DL — SIGNIFICANT CHANGE UP (ref 11.5–15.5)
IMM GRANULOCYTES NFR BLD AUTO: 0.3 % — SIGNIFICANT CHANGE UP (ref 0–1.5)
INR BLD: 1.83 RATIO — HIGH (ref 0.88–1.16)
LYMPHOCYTES # BLD AUTO: 0.67 K/UL — LOW (ref 1–3.3)
LYMPHOCYTES # BLD AUTO: 7 % — LOW (ref 13–44)
MCHC RBC-ENTMCNC: 28.9 PG — SIGNIFICANT CHANGE UP (ref 27–34)
MCHC RBC-ENTMCNC: 35 GM/DL — SIGNIFICANT CHANGE UP (ref 32–36)
MCV RBC AUTO: 82.6 FL — SIGNIFICANT CHANGE UP (ref 80–100)
MONOCYTES # BLD AUTO: 0.71 K/UL — SIGNIFICANT CHANGE UP (ref 0–0.9)
MONOCYTES NFR BLD AUTO: 7.4 % — SIGNIFICANT CHANGE UP (ref 2–14)
NEUTROPHILS # BLD AUTO: 8.2 K/UL — HIGH (ref 1.8–7.4)
NEUTROPHILS NFR BLD AUTO: 85 % — HIGH (ref 43–77)
NRBC # BLD: 0 /100 WBCS — SIGNIFICANT CHANGE UP (ref 0–0)
PLATELET # BLD AUTO: 261 K/UL — SIGNIFICANT CHANGE UP (ref 150–400)
POTASSIUM SERPL-MCNC: 3.7 MMOL/L — SIGNIFICANT CHANGE UP (ref 3.5–5.3)
POTASSIUM SERPL-SCNC: 3.7 MMOL/L — SIGNIFICANT CHANGE UP (ref 3.5–5.3)
PROT SERPL-MCNC: 7.7 G/DL — SIGNIFICANT CHANGE UP (ref 6–8.3)
PROTHROM AB SERPL-ACNC: 21.5 SEC — HIGH (ref 10–12.9)
RBC # BLD: 4.43 M/UL — SIGNIFICANT CHANGE UP (ref 3.8–5.2)
RBC # FLD: 12.4 % — SIGNIFICANT CHANGE UP (ref 10.3–14.5)
RSV RESULT: DETECTED
RSV RNA RESP QL NAA+PROBE: DETECTED
SODIUM SERPL-SCNC: 137 MMOL/L — SIGNIFICANT CHANGE UP (ref 135–145)
WBC # BLD: 9.64 K/UL — SIGNIFICANT CHANGE UP (ref 3.8–10.5)
WBC # FLD AUTO: 9.64 K/UL — SIGNIFICANT CHANGE UP (ref 3.8–10.5)

## 2019-12-06 PROCEDURE — 99284 EMERGENCY DEPT VISIT MOD MDM: CPT | Mod: 25

## 2019-12-06 PROCEDURE — 85610 PROTHROMBIN TIME: CPT

## 2019-12-06 PROCEDURE — 99284 EMERGENCY DEPT VISIT MOD MDM: CPT

## 2019-12-06 PROCEDURE — 87631 RESP VIRUS 3-5 TARGETS: CPT

## 2019-12-06 PROCEDURE — 85027 COMPLETE CBC AUTOMATED: CPT

## 2019-12-06 PROCEDURE — 36415 COLL VENOUS BLD VENIPUNCTURE: CPT

## 2019-12-06 PROCEDURE — 80053 COMPREHEN METABOLIC PANEL: CPT

## 2019-12-06 PROCEDURE — 84702 CHORIONIC GONADOTROPIN TEST: CPT

## 2019-12-06 PROCEDURE — 82550 ASSAY OF CK (CPK): CPT

## 2019-12-06 RX ORDER — SODIUM CHLORIDE 9 MG/ML
1000 INJECTION, SOLUTION INTRAVENOUS ONCE
Refills: 0 | Status: COMPLETED | OUTPATIENT
Start: 2019-12-06 | End: 2019-12-06

## 2019-12-06 RX ORDER — ACETAMINOPHEN 500 MG
975 TABLET ORAL ONCE
Refills: 0 | Status: COMPLETED | OUTPATIENT
Start: 2019-12-06 | End: 2019-12-06

## 2019-12-06 RX ADMIN — Medication 975 MILLIGRAM(S): at 19:01

## 2019-12-06 RX ADMIN — SODIUM CHLORIDE 2000 MILLILITER(S): 9 INJECTION, SOLUTION INTRAVENOUS at 18:49

## 2019-12-06 NOTE — ED PROVIDER NOTE - NS ED ROS FT
Constitutional: No fever or chills  Eyes: No visual changes, eye pain or redness  HEENT: No throat pain, ear pain, nasal pain. No nose bleeding.  CV: No chest pain or lower extremity edema  Resp: No SOB no cough  GI: No abd pain. No nausea or vomiting. No diarrhea. No constipation.   : No dysuria, hematuria.   MSK: see hpi  Skin: No rash  Neuro: see hpi

## 2019-12-06 NOTE — ED PROVIDER NOTE - ATTENDING CONTRIBUTION TO CARE
Attending MD Brady:   I personally have seen and examined this patient.  Physician assistant note reviewed and agree on plan of care and except where noted.  See HPI, PE, and MDM for details.

## 2019-12-06 NOTE — ED PROVIDER NOTE - CLINICAL SUMMARY MEDICAL DECISION MAKING FREE TEXT BOX
Attending MD Brady: 28 yo female with pmh of lupus on Plaquenil, RUE DVT on coumadin presenting with body aches and pain, febrile here to 100.5, otherwise nonlocalizing exam. Patient endorses LLE weakness but related to the pain, nonfocal neurologic exam. Labs non-actionable, +RSV which likely explains arthralgias. Patient educated on expected course of RSV infection, INR 1.8, patient does miss doses of coumadin at times and patient counseled on adherence to coumadin. Needs INR recheck next week, patient aware.

## 2019-12-06 NOTE — ED PROVIDER NOTE - NSFOLLOWUPINSTRUCTIONS_ED_ALL_ED_FT
- stay hydrated.   - alternate between tylenol 975mg every 6 hours as needed for fevers/chills.  - follow up with your rheumatologist in the next 2-3 days for INR recheck.   - return if symptoms worsen,  weakness, numbness/tingling, difficulty ambulating, shortness of breath, difficulty breathing and all other concerns.

## 2019-12-06 NOTE — ED PROVIDER NOTE - PATIENT PORTAL LINK FT
You can access the FollowMyHealth Patient Portal offered by [facility] by registering at the following website: [email or web address]. By joining [facility]’s FollowMyHealth portal, you will also be able to view your health information using other applications (apps) compatible with our system.

## 2019-12-06 NOTE — ED PROVIDER NOTE - PROGRESS NOTE DETAILS
Spoke with Dr. Joan Croft, oncall doctor for pts rheumatologist Dr. Álvarez, who recommends infectious work up, possible influenza exacerbating her lupus although her symptoms sound nonspecific. if concerned for neurological defects, would recommend neuro consult. -Anna Maza PA-C

## 2019-12-06 NOTE — ED PROVIDER NOTE - OBJECTIVE STATEMENT
28 yo female with pmh of lupus on Plaquenil, RUE DVT on coumadin presenting for concern of body aches since awaking this morning. Pt states she had body aches throughout her entire body when then progressed to only her lower extremities below her waist, is throbbing, worse when she ambulates, and has intermittent sharp pain in her left knee, also feels as if her left LE is weaker than her right especially when she is ambulating. denies recent trauma/injuries, no back pain or injury, no fevers, chills, nausea, vomiting, abdominal pain, dysuria, pain with urination.

## 2019-12-06 NOTE — ED ADULT NURSE NOTE - OBJECTIVE STATEMENT
29y f pt c/o waking up today with body aches and feeling feverish; pt has sick contact at work; works in md office; pt has not had flu shot; aox3; no resp distress; no chest pain; + cough with some production; no abd pain; no n/v/d; iv placed; labs drawn; pt medicated per md order; swabbed for flu; safety/comfort maintained

## 2020-03-11 ENCOUNTER — APPOINTMENT (OUTPATIENT)
Dept: RHEUMATOLOGY | Facility: CLINIC | Age: 30
End: 2020-03-11
Payer: COMMERCIAL

## 2020-03-11 VITALS
DIASTOLIC BLOOD PRESSURE: 73 MMHG | TEMPERATURE: 98.4 F | WEIGHT: 138 LBS | OXYGEN SATURATION: 98 % | HEART RATE: 91 BPM | HEIGHT: 59 IN | BODY MASS INDEX: 27.82 KG/M2 | SYSTOLIC BLOOD PRESSURE: 112 MMHG

## 2020-03-11 PROCEDURE — 99214 OFFICE O/P EST MOD 30 MIN: CPT

## 2020-03-12 LAB
ALBUMIN SERPL ELPH-MCNC: 4.4 G/DL
ALP BLD-CCNC: 60 U/L
ALT SERPL-CCNC: 7 U/L
ANION GAP SERPL CALC-SCNC: 12 MMOL/L
AST SERPL-CCNC: 14 U/L
BASOPHILS # BLD AUTO: 0.04 K/UL
BASOPHILS NFR BLD AUTO: 0.6 %
BILIRUB SERPL-MCNC: 0.2 MG/DL
BUN SERPL-MCNC: 10 MG/DL
C3 SERPL-MCNC: 84 MG/DL
C4 SERPL-MCNC: 15 MG/DL
CALCIUM SERPL-MCNC: 9.1 MG/DL
CHLORIDE SERPL-SCNC: 104 MMOL/L
CK SERPL-CCNC: 63 U/L
CO2 SERPL-SCNC: 23 MMOL/L
CREAT SERPL-MCNC: 0.74 MG/DL
CRP SERPL-MCNC: <0.1 MG/DL
DSDNA AB SER-ACNC: 68 IU/ML
EOSINOPHIL # BLD AUTO: 0.06 K/UL
EOSINOPHIL NFR BLD AUTO: 0.8 %
ERYTHROCYTE [SEDIMENTATION RATE] IN BLOOD BY WESTERGREN METHOD: 7 MM/HR
GLUCOSE SERPL-MCNC: 118 MG/DL
HCT VFR BLD CALC: 38.9 %
HGB BLD-MCNC: 12.4 G/DL
IMM GRANULOCYTES NFR BLD AUTO: 0.4 %
INR PPP: 1.36 RATIO
LYMPHOCYTES # BLD AUTO: 1.6 K/UL
LYMPHOCYTES NFR BLD AUTO: 22.2 %
MAN DIFF?: NORMAL
MCHC RBC-ENTMCNC: 28.3 PG
MCHC RBC-ENTMCNC: 31.9 GM/DL
MCV RBC AUTO: 88.8 FL
MONOCYTES # BLD AUTO: 0.42 K/UL
MONOCYTES NFR BLD AUTO: 5.8 %
MYOGLOBIN SERPL-MCNC: <21 NG/ML
NEUTROPHILS # BLD AUTO: 5.07 K/UL
NEUTROPHILS NFR BLD AUTO: 70.2 %
PLATELET # BLD AUTO: 317 K/UL
POTASSIUM SERPL-SCNC: 3.9 MMOL/L
PROT SERPL-MCNC: 7.1 G/DL
PT BLD: 15.6 SEC
RBC # BLD: 4.38 M/UL
RBC # FLD: 13.5 %
SODIUM SERPL-SCNC: 140 MMOL/L
WBC # FLD AUTO: 7.22 K/UL

## 2020-03-16 LAB — ALDOLASE SERPL-CCNC: 2.8 U/L

## 2020-03-26 ENCOUNTER — APPOINTMENT (OUTPATIENT)
Dept: ULTRASOUND IMAGING | Facility: HOSPITAL | Age: 30
End: 2020-03-26

## 2020-03-26 ENCOUNTER — APPOINTMENT (OUTPATIENT)
Dept: ULTRASOUND IMAGING | Facility: CLINIC | Age: 30
End: 2020-03-26

## 2020-03-27 ENCOUNTER — APPOINTMENT (OUTPATIENT)
Dept: VASCULAR SURGERY | Facility: CLINIC | Age: 30
End: 2020-03-27
Payer: COMMERCIAL

## 2020-03-27 PROCEDURE — 99213 OFFICE O/P EST LOW 20 MIN: CPT

## 2020-03-27 PROCEDURE — 93931 UPPER EXTREMITY STUDY: CPT

## 2020-03-27 NOTE — HISTORY OF PRESENT ILLNESS
[FreeTextEntry1] : Patient is a 29-year-old with history of lupus and arterial occlusion involving her right ulnar artery.  She was treated with anticoagulation and currently is on anticoagulation, although she is somewhat noncompliant.  She comes to us with complaints of some tingling of the right hand and burning sensation.  She is concerned about extension of the thrombosis of the right ulnar artery.  No significant rest pain.  No history of ulceration.  No claudication symptoms.

## 2020-03-27 NOTE — PHYSICAL EXAM
[JVD] : no jugular venous distention  [Normal Breath Sounds] : Normal breath sounds [Normal Heart Sounds] : normal heart sounds [2+] : left 2+ [Ankle Swelling (On Exam)] : not present [Varicose Veins Of Lower Extremities] : not present [] : not present [Abdomen Masses] : No abdominal masses [Skin Ulcer] : no ulcer [Alert] : alert [Oriented to Person] : oriented to person [Oriented to Place] : oriented to place

## 2020-03-27 NOTE — ASSESSMENT
[FreeTextEntry1] : Patient with lupus and hypercoagulable state.  No evidence of extension of thrombus in the right ulnar artery.  In fact there is recanalization of the artery with good flow throughout.  Continue anticoagulation.  I stressed to the patient how important it is to be consistent and compliant with her anticoagulation.

## 2020-05-26 ENCOUNTER — RESULT REVIEW (OUTPATIENT)
Age: 30
End: 2020-05-26

## 2020-06-13 ENCOUNTER — RESULT REVIEW (OUTPATIENT)
Age: 30
End: 2020-06-13

## 2020-07-21 ENCOUNTER — APPOINTMENT (OUTPATIENT)
Dept: RHEUMATOLOGY | Facility: CLINIC | Age: 30
End: 2020-07-21

## 2020-07-29 ENCOUNTER — APPOINTMENT (OUTPATIENT)
Dept: RHEUMATOLOGY | Facility: CLINIC | Age: 30
End: 2020-07-29
Payer: COMMERCIAL

## 2020-07-29 VITALS
OXYGEN SATURATION: 98 % | HEIGHT: 59 IN | HEART RATE: 80 BPM | BODY MASS INDEX: 27.82 KG/M2 | TEMPERATURE: 97.6 F | WEIGHT: 138 LBS | RESPIRATION RATE: 16 BRPM

## 2020-07-29 PROCEDURE — 99214 OFFICE O/P EST MOD 30 MIN: CPT

## 2020-07-30 LAB
ALBUMIN SERPL ELPH-MCNC: 4.7 G/DL
ALP BLD-CCNC: 74 U/L
ALT SERPL-CCNC: 8 U/L
ANION GAP SERPL CALC-SCNC: 12 MMOL/L
APPEARANCE: CLEAR
AST SERPL-CCNC: 12 U/L
B2 GLYCOPROT1 IGA SERPL IA-ACNC: 11.6 SAU
B2 GLYCOPROT1 IGG SER-ACNC: <5 SGU
B2 GLYCOPROT1 IGM SER-ACNC: >150 SMU
BASOPHILS # BLD AUTO: 0.05 K/UL
BASOPHILS NFR BLD AUTO: 0.7 %
BILIRUB SERPL-MCNC: 0.3 MG/DL
BILIRUBIN URINE: NEGATIVE
BLOOD URINE: NEGATIVE
BUN SERPL-MCNC: 13 MG/DL
C3 SERPL-MCNC: 85 MG/DL
C4 SERPL-MCNC: 15 MG/DL
CALCIUM SERPL-MCNC: 9.8 MG/DL
CARDIOLIPIN IGM SER-MCNC: 10 GPL
CARDIOLIPIN IGM SER-MCNC: 87.5 MPL
CHLORIDE SERPL-SCNC: 102 MMOL/L
CO2 SERPL-SCNC: 26 MMOL/L
COLOR: NORMAL
CONFIRM: 32.4 SEC
CREAT SERPL-MCNC: 0.73 MG/DL
CREAT SPEC-SCNC: 65 MG/DL
CREAT/PROT UR: 0.1 RATIO
CRP SERPL-MCNC: <0.1 MG/DL
DEPRECATED CARDIOLIPIN IGA SER: 6.4 APL
DEPRECATED KAPPA LC FREE/LAMBDA SER: 0.75 RATIO
DRVVT IMM 1:2 NP PPP: ABNORMAL
DRVVT SCREEN TO CONFIRM RATIO: 1.7 RATIO
DSDNA AB SER-ACNC: 82 IU/ML
EOSINOPHIL # BLD AUTO: 0.06 K/UL
EOSINOPHIL NFR BLD AUTO: 0.9 %
ERYTHROCYTE [SEDIMENTATION RATE] IN BLOOD BY WESTERGREN METHOD: 4 MM/HR
GLUCOSE QUALITATIVE U: NEGATIVE
GLUCOSE SERPL-MCNC: 86 MG/DL
HCT VFR BLD CALC: 38.5 %
HGB BLD-MCNC: 13.1 G/DL
IGA SER QL IEP: 288 MG/DL
IGG SER QL IEP: 1104 MG/DL
IGM SER QL IEP: 172 MG/DL
IMM GRANULOCYTES NFR BLD AUTO: 0.3 %
KAPPA LC CSF-MCNC: 1.67 MG/DL
KAPPA LC SERPL-MCNC: 1.25 MG/DL
KETONES URINE: NEGATIVE
LEUKOCYTE ESTERASE URINE: NEGATIVE
LYMPHOCYTES # BLD AUTO: 1.64 K/UL
LYMPHOCYTES NFR BLD AUTO: 24.4 %
MAN DIFF?: NORMAL
MCHC RBC-ENTMCNC: 29.3 PG
MCHC RBC-ENTMCNC: 34 GM/DL
MCV RBC AUTO: 86.1 FL
MONOCYTES # BLD AUTO: 0.5 K/UL
MONOCYTES NFR BLD AUTO: 7.5 %
NEUTROPHILS # BLD AUTO: 4.44 K/UL
NEUTROPHILS NFR BLD AUTO: 66.2 %
NITRITE URINE: NEGATIVE
PH URINE: 6.5
PLATELET # BLD AUTO: 329 K/UL
POTASSIUM SERPL-SCNC: 4.3 MMOL/L
PROT SERPL-MCNC: 7.2 G/DL
PROT UR-MCNC: 5 MG/DL
PROTEIN URINE: NORMAL
RBC # BLD: 4.47 M/UL
RBC # FLD: 12.7 %
SCREEN DRVVT: 61 SEC
SODIUM SERPL-SCNC: 140 MMOL/L
SPECIFIC GRAVITY URINE: 1.02
TSH SERPL-ACNC: 1.89 UIU/ML
UROBILINOGEN URINE: NORMAL
WBC # FLD AUTO: 6.71 K/UL

## 2020-07-31 NOTE — ASSESSMENT
[FreeTextEntry1] : 28 year-old female with red rash over feet and hand with severe pain - appearance of vasculitis\par Labs with positive DAMON and DSDNA, triple positive APS\par \par 1. SLE - on Plaquenil 200 mg BID - no side effects -no rashes or active symptoms - continue with plaquenil BID \par \par 2. Arterial Occlusion -complete occlusion triple positive APS profile - inconsistent with Coumadin -  has worsening right arm weakness - recent vascular Doppler with recanalization of the ulnar artery, patient to re-start Coumadin to avoid other possible clots\par \par I reviewed previous labs results with patients.\par Laboratory tests ordered today\par Diagnosis and Prognosis discussed\par Continue with current medications\par medications refilled\par education provided on\par F/u 2 months\par

## 2020-07-31 NOTE — PHYSICAL EXAM
[General Appearance - Alert] : alert [Outer Ear] : the ears and nose were normal in appearance [Oropharynx] : the oropharynx was normal [General Appearance - In No Acute Distress] : in no acute distress [Jugular Venous Distention Increased] : there was no jugular-venous distention [Neck Appearance] : the appearance of the neck was normal [Neck Cervical Mass (___cm)] : no neck mass was observed [Thyroid Diffuse Enlargement] : the thyroid was not enlarged [Thyroid Nodule] : there were no palpable thyroid nodules [Heart Rate And Rhythm] : heart rate was normal and rhythm regular [Auscultation Breath Sounds / Voice Sounds] : lungs were clear to auscultation bilaterally [Heart Sounds Gallop] : no gallops [Heart Sounds] : normal S1 and S2 [Murmurs] : no murmurs [Heart Sounds Pericardial Friction Rub] : no pericardial rub [Bowel Sounds] : normal bowel sounds [Abdomen Soft] : soft [Abdomen Tenderness] : non-tender [Cervical Lymph Nodes Enlarged Posterior Bilaterally] : posterior cervical [Abdomen Mass (___ Cm)] : no abdominal mass palpated [No CVA Tenderness] : no ~M costovertebral angle tenderness [Supraclavicular Lymph Nodes Enlarged Bilaterally] : supraclavicular [Cervical Lymph Nodes Enlarged Anterior Bilaterally] : anterior cervical [Abnormal Walk] : normal gait [No Spinal Tenderness] : no spinal tenderness [Motor Tone] : muscle strength and tone were normal [Nail Clubbing] : no clubbing  or cyanosis of the fingernails [Musculoskeletal - Swelling] : no joint swelling seen [] : no rash [Skin Turgor] : normal skin turgor [Skin Color & Pigmentation] : normal skin color and pigmentation [Affect] : the affect was normal [Oriented To Time, Place, And Person] : oriented to person, place, and time [Impaired Insight] : insight and judgment were intact [FreeTextEntry1] : no weakness on exam

## 2020-07-31 NOTE — HISTORY OF PRESENT ILLNESS
[___ Month(s) Ago] : [unfilled] month(s) ago [FreeTextEntry1] : has not been consistent with Coumadin - has been off for about 2 weeks\par still on plaquenil\par recent vascular apt with recanalization of the artery [Muscle Weakness] : no muscle weakness [Skin Lesions] : no lesions [None] : The patient is currently asymptomatic

## 2020-10-12 ENCOUNTER — RESULT REVIEW (OUTPATIENT)
Age: 30
End: 2020-10-12

## 2020-10-25 ENCOUNTER — APPOINTMENT (OUTPATIENT)
Dept: FAMILY MEDICINE | Facility: CLINIC | Age: 30
End: 2020-10-25

## 2020-10-27 ENCOUNTER — APPOINTMENT (OUTPATIENT)
Dept: RHEUMATOLOGY | Facility: CLINIC | Age: 30
End: 2020-10-27
Payer: COMMERCIAL

## 2020-10-27 VITALS
TEMPERATURE: 97.4 F | DIASTOLIC BLOOD PRESSURE: 71 MMHG | SYSTOLIC BLOOD PRESSURE: 113 MMHG | RESPIRATION RATE: 16 BRPM | HEIGHT: 59 IN | OXYGEN SATURATION: 98 % | HEART RATE: 74 BPM | BODY MASS INDEX: 28.22 KG/M2 | WEIGHT: 140 LBS

## 2020-10-27 PROCEDURE — 99072 ADDL SUPL MATRL&STAF TM PHE: CPT

## 2020-10-27 PROCEDURE — 99214 OFFICE O/P EST MOD 30 MIN: CPT | Mod: 25

## 2020-11-03 LAB
ALBUMIN SERPL ELPH-MCNC: 4.8 G/DL
ALP BLD-CCNC: 66 U/L
ALT SERPL-CCNC: 9 U/L
ANION GAP SERPL CALC-SCNC: 15 MMOL/L
APPEARANCE: CLEAR
AST SERPL-CCNC: 16 U/L
BASOPHILS # BLD AUTO: 0.04 K/UL
BASOPHILS NFR BLD AUTO: 0.6 %
BILIRUB SERPL-MCNC: 0.3 MG/DL
BILIRUBIN URINE: NEGATIVE
BLOOD URINE: NEGATIVE
BUN SERPL-MCNC: 11 MG/DL
C3 SERPL-MCNC: 99 MG/DL
C4 SERPL-MCNC: 17 MG/DL
CALCIUM SERPL-MCNC: 9.2 MG/DL
CHLORIDE SERPL-SCNC: 102 MMOL/L
CO2 SERPL-SCNC: 24 MMOL/L
COLOR: COLORLESS
CREAT SERPL-MCNC: 0.65 MG/DL
CREAT SPEC-SCNC: 32 MG/DL
CREAT/PROT UR: 0.2 RATIO
CRP SERPL-MCNC: <0.1 MG/DL
DSDNA AB SER-ACNC: 100 IU/ML
EOSINOPHIL # BLD AUTO: 0.06 K/UL
EOSINOPHIL NFR BLD AUTO: 0.9 %
ERYTHROCYTE [SEDIMENTATION RATE] IN BLOOD BY WESTERGREN METHOD: 3 MM/HR
GLUCOSE QUALITATIVE U: NEGATIVE
GLUCOSE SERPL-MCNC: 68 MG/DL
HCT VFR BLD CALC: 39.3 %
HGB BLD-MCNC: 13.3 G/DL
IMM GRANULOCYTES NFR BLD AUTO: 0.1 %
INR PPP: 1.2 RATIO
KETONES URINE: NEGATIVE
LEUKOCYTE ESTERASE URINE: NEGATIVE
LYMPHOCYTES # BLD AUTO: 2.19 K/UL
LYMPHOCYTES NFR BLD AUTO: 31.4 %
MAN DIFF?: NORMAL
MCHC RBC-ENTMCNC: 29.1 PG
MCHC RBC-ENTMCNC: 33.8 GM/DL
MCV RBC AUTO: 86 FL
MONOCYTES # BLD AUTO: 0.57 K/UL
MONOCYTES NFR BLD AUTO: 8.2 %
NEUTROPHILS # BLD AUTO: 4.1 K/UL
NEUTROPHILS NFR BLD AUTO: 58.8 %
NITRITE URINE: NEGATIVE
PH URINE: 7
PLATELET # BLD AUTO: 343 K/UL
POTASSIUM SERPL-SCNC: 3.8 MMOL/L
PROT SERPL-MCNC: 7.3 G/DL
PROT UR-MCNC: 6 MG/DL
PROTEIN URINE: NEGATIVE
PT BLD: 14 SEC
RBC # BLD: 4.57 M/UL
RBC # FLD: 13 %
SODIUM SERPL-SCNC: 141 MMOL/L
SPECIFIC GRAVITY URINE: 1.01
UROBILINOGEN URINE: NORMAL
WBC # FLD AUTO: 6.97 K/UL

## 2020-11-09 LAB
INR PPP: 1.36 RATIO
PT BLD: 16 SEC

## 2020-12-22 ENCOUNTER — APPOINTMENT (OUTPATIENT)
Dept: RHEUMATOLOGY | Facility: CLINIC | Age: 30
End: 2020-12-22
Payer: COMMERCIAL

## 2020-12-22 ENCOUNTER — LABORATORY RESULT (OUTPATIENT)
Age: 30
End: 2020-12-22

## 2020-12-22 PROCEDURE — 99214 OFFICE O/P EST MOD 30 MIN: CPT

## 2020-12-22 PROCEDURE — 99072 ADDL SUPL MATRL&STAF TM PHE: CPT

## 2020-12-23 LAB
ALBUMIN SERPL ELPH-MCNC: 4.8 G/DL
ALP BLD-CCNC: 75 U/L
ALT SERPL-CCNC: 9 U/L
ANION GAP SERPL CALC-SCNC: 15 MMOL/L
APPEARANCE: CLEAR
AST SERPL-CCNC: 16 U/L
BASOPHILS # BLD AUTO: 0.06 K/UL
BASOPHILS NFR BLD AUTO: 0.9 %
BILIRUB SERPL-MCNC: 0.3 MG/DL
BILIRUBIN URINE: NEGATIVE
BLOOD URINE: NORMAL
BUN SERPL-MCNC: 11 MG/DL
CALCIUM SERPL-MCNC: 9.5 MG/DL
CHLORIDE SERPL-SCNC: 99 MMOL/L
CO2 SERPL-SCNC: 23 MMOL/L
COLOR: NORMAL
CREAT SERPL-MCNC: 0.81 MG/DL
CREAT SPEC-SCNC: 98 MG/DL
CREAT/PROT UR: 0.1 RATIO
CRP SERPL-MCNC: 0.11 MG/DL
EOSINOPHIL # BLD AUTO: 0.08 K/UL
EOSINOPHIL NFR BLD AUTO: 1.2 %
ERYTHROCYTE [SEDIMENTATION RATE] IN BLOOD BY WESTERGREN METHOD: 5 MM/HR
GLUCOSE QUALITATIVE U: NEGATIVE
GLUCOSE SERPL-MCNC: 67 MG/DL
HCT VFR BLD CALC: 41.2 %
HGB BLD-MCNC: 13.9 G/DL
IMM GRANULOCYTES NFR BLD AUTO: 0.3 %
KETONES URINE: NEGATIVE
LEUKOCYTE ESTERASE URINE: NEGATIVE
LYMPHOCYTES # BLD AUTO: 2.08 K/UL
LYMPHOCYTES NFR BLD AUTO: 31.2 %
MAN DIFF?: NORMAL
MCHC RBC-ENTMCNC: 29 PG
MCHC RBC-ENTMCNC: 33.7 GM/DL
MCV RBC AUTO: 85.8 FL
MONOCYTES # BLD AUTO: 0.54 K/UL
MONOCYTES NFR BLD AUTO: 8.1 %
NEUTROPHILS # BLD AUTO: 3.88 K/UL
NEUTROPHILS NFR BLD AUTO: 58.3 %
NITRITE URINE: NEGATIVE
PH URINE: 6.5
PLATELET # BLD AUTO: 404 K/UL
POTASSIUM SERPL-SCNC: 4.2 MMOL/L
PROT SERPL-MCNC: 7.6 G/DL
PROT UR-MCNC: 8 MG/DL
PROTEIN URINE: ABNORMAL
RBC # BLD: 4.8 M/UL
RBC # FLD: 12.8 %
SODIUM SERPL-SCNC: 137 MMOL/L
SPECIFIC GRAVITY URINE: 1.02
TSH SERPL-ACNC: 1.79 UIU/ML
UROBILINOGEN URINE: NORMAL
WBC # FLD AUTO: 6.66 K/UL

## 2020-12-26 LAB
C3 SERPL-MCNC: 102 MG/DL
C4 SERPL-MCNC: 19 MG/DL
DEPRECATED KAPPA LC FREE/LAMBDA SER: 0.65 RATIO
DSDNA AB SER-ACNC: 62 IU/ML
IGA SER QL IEP: 303 MG/DL
IGG SER QL IEP: 1268 MG/DL
IGM SER QL IEP: 195 MG/DL
KAPPA LC CSF-MCNC: 2.01 MG/DL
KAPPA LC SERPL-MCNC: 1.31 MG/DL

## 2021-01-06 LAB — DHEA-SULFATE, SERUM: 337 UG/DL

## 2021-02-24 ENCOUNTER — LABORATORY RESULT (OUTPATIENT)
Age: 31
End: 2021-02-24

## 2021-02-24 ENCOUNTER — APPOINTMENT (OUTPATIENT)
Dept: RHEUMATOLOGY | Facility: CLINIC | Age: 31
End: 2021-02-24
Payer: COMMERCIAL

## 2021-02-24 VITALS
OXYGEN SATURATION: 98 % | SYSTOLIC BLOOD PRESSURE: 116 MMHG | HEIGHT: 59 IN | HEART RATE: 78 BPM | DIASTOLIC BLOOD PRESSURE: 72 MMHG | RESPIRATION RATE: 16 BRPM | TEMPERATURE: 97.1 F

## 2021-02-24 PROCEDURE — 99072 ADDL SUPL MATRL&STAF TM PHE: CPT

## 2021-02-24 PROCEDURE — 99214 OFFICE O/P EST MOD 30 MIN: CPT

## 2021-03-02 ENCOUNTER — TRANSCRIPTION ENCOUNTER (OUTPATIENT)
Age: 31
End: 2021-03-02

## 2021-03-02 LAB
ALBUMIN MFR SERPL ELPH: 58.6 %
ALBUMIN SERPL ELPH-MCNC: 4.8 G/DL
ALBUMIN SERPL-MCNC: 4.5 G/DL
ALBUMIN/GLOB SERPL: 1.4 RATIO
ALP BLD-CCNC: 79 U/L
ALPHA1 GLOB MFR SERPL ELPH: 3.1 %
ALPHA1 GLOB SERPL ELPH-MCNC: 0.2 G/DL
ALPHA2 GLOB MFR SERPL ELPH: 8.5 %
ALPHA2 GLOB SERPL ELPH-MCNC: 0.7 G/DL
ALT SERPL-CCNC: 9 U/L
ANION GAP SERPL CALC-SCNC: 14 MMOL/L
APPEARANCE: CLEAR
AST SERPL-CCNC: 14 U/L
B-GLOBULIN MFR SERPL ELPH: 13 %
B-GLOBULIN SERPL ELPH-MCNC: 1 G/DL
BASOPHILS # BLD AUTO: 0.05 K/UL
BASOPHILS NFR BLD AUTO: 0.7 %
BILIRUB SERPL-MCNC: 0.4 MG/DL
BILIRUBIN URINE: NEGATIVE
BLOOD URINE: NEGATIVE
BUN SERPL-MCNC: 8 MG/DL
C3 SERPL-MCNC: 92 MG/DL
C4 SERPL-MCNC: 19 MG/DL
CALCIUM SERPL-MCNC: 9.9 MG/DL
CHLORIDE SERPL-SCNC: 100 MMOL/L
CO2 SERPL-SCNC: 25 MMOL/L
COLOR: NORMAL
CREAT SERPL-MCNC: 0.76 MG/DL
CREAT SPEC-SCNC: 50 MG/DL
CREAT/PROT UR: 0.1 RATIO
CRP SERPL-MCNC: 0.12 MG/DL
DEPRECATED KAPPA LC FREE/LAMBDA SER: 0.79 RATIO
DSDNA AB SER-ACNC: 102 IU/ML
EOSINOPHIL # BLD AUTO: 0.06 K/UL
EOSINOPHIL NFR BLD AUTO: 0.9 %
ERYTHROCYTE [SEDIMENTATION RATE] IN BLOOD BY WESTERGREN METHOD: 3 MM/HR
GAMMA GLOB FLD ELPH-MCNC: 1.3 G/DL
GAMMA GLOB MFR SERPL ELPH: 16.8 %
GLUCOSE QUALITATIVE U: NEGATIVE
GLUCOSE SERPL-MCNC: 89 MG/DL
HCT VFR BLD CALC: 42.1 %
HGB BLD-MCNC: 14.3 G/DL
IGA SER QL IEP: 278 MG/DL
IGG SER QL IEP: 1180 MG/DL
IGM SER QL IEP: 182 MG/DL
IMM GRANULOCYTES NFR BLD AUTO: 0.3 %
INR PPP: 0.99 RATIO
INTERPRETATION SERPL IEP-IMP: NORMAL
KAPPA LC CSF-MCNC: 1.77 MG/DL
KAPPA LC SERPL-MCNC: 1.39 MG/DL
KETONES URINE: NEGATIVE
LEUKOCYTE ESTERASE URINE: ABNORMAL
LYMPHOCYTES # BLD AUTO: 2.06 K/UL
LYMPHOCYTES NFR BLD AUTO: 30.8 %
M PROTEIN SPEC IFE-MCNC: NORMAL
MAN DIFF?: NORMAL
MCHC RBC-ENTMCNC: 30 PG
MCHC RBC-ENTMCNC: 34 GM/DL
MCV RBC AUTO: 88.3 FL
MONOCYTES # BLD AUTO: 0.46 K/UL
MONOCYTES NFR BLD AUTO: 6.9 %
NEUTROPHILS # BLD AUTO: 4.04 K/UL
NEUTROPHILS NFR BLD AUTO: 60.4 %
NITRITE URINE: NEGATIVE
PH URINE: 6.5
PLATELET # BLD AUTO: 370 K/UL
POTASSIUM SERPL-SCNC: 4.1 MMOL/L
PROT SERPL-MCNC: 7.7 G/DL
PROT UR-MCNC: 4 MG/DL
PROTEIN URINE: NORMAL
PT BLD: 11.7 SEC
RBC # BLD: 4.77 M/UL
RBC # FLD: 13.2 %
SODIUM SERPL-SCNC: 140 MMOL/L
SPECIFIC GRAVITY URINE: 1.01
UROBILINOGEN URINE: NORMAL
WBC # FLD AUTO: 6.69 K/UL

## 2021-03-09 ENCOUNTER — RX RENEWAL (OUTPATIENT)
Age: 31
End: 2021-03-09

## 2021-03-11 RX ORDER — RIVAROXABAN 20 MG/1
20 TABLET, FILM COATED ORAL
Qty: 90 | Refills: 3 | Status: DISCONTINUED | COMMUNITY
Start: 2020-12-22 | End: 2021-03-11

## 2021-05-25 ENCOUNTER — APPOINTMENT (OUTPATIENT)
Dept: RHEUMATOLOGY | Facility: CLINIC | Age: 31
End: 2021-05-25
Payer: COMMERCIAL

## 2021-05-25 ENCOUNTER — LABORATORY RESULT (OUTPATIENT)
Age: 31
End: 2021-05-25

## 2021-05-25 VITALS
TEMPERATURE: 97.8 F | OXYGEN SATURATION: 98 % | SYSTOLIC BLOOD PRESSURE: 127 MMHG | HEIGHT: 59 IN | WEIGHT: 141 LBS | BODY MASS INDEX: 28.43 KG/M2 | DIASTOLIC BLOOD PRESSURE: 83 MMHG | HEART RATE: 84 BPM | RESPIRATION RATE: 16 BRPM

## 2021-05-25 PROCEDURE — 99214 OFFICE O/P EST MOD 30 MIN: CPT

## 2021-05-25 PROCEDURE — 99072 ADDL SUPL MATRL&STAF TM PHE: CPT

## 2021-05-27 LAB
ALBUMIN MFR SERPL ELPH: 60.3 %
ALBUMIN SERPL ELPH-MCNC: 4.8 G/DL
ALBUMIN SERPL-MCNC: 4.4 G/DL
ALBUMIN/GLOB SERPL: 1.5 RATIO
ALP BLD-CCNC: 67 U/L
ALPHA1 GLOB MFR SERPL ELPH: 3.1 %
ALPHA1 GLOB SERPL ELPH-MCNC: 0.2 G/DL
ALPHA2 GLOB MFR SERPL ELPH: 7.9 %
ALPHA2 GLOB SERPL ELPH-MCNC: 0.6 G/DL
ALT SERPL-CCNC: 8 U/L
ANION GAP SERPL CALC-SCNC: 14 MMOL/L
APPEARANCE: CLEAR
AST SERPL-CCNC: 16 U/L
B-GLOBULIN MFR SERPL ELPH: 11.7 %
B-GLOBULIN SERPL ELPH-MCNC: 0.9 G/DL
BASOPHILS # BLD AUTO: 0.05 K/UL
BASOPHILS NFR BLD AUTO: 0.8 %
BILIRUB SERPL-MCNC: 0.4 MG/DL
BILIRUBIN URINE: NEGATIVE
BLOOD URINE: NEGATIVE
BUN SERPL-MCNC: 10 MG/DL
C3 SERPL-MCNC: 90 MG/DL
C4 SERPL-MCNC: 16 MG/DL
CALCIUM SERPL-MCNC: 9.4 MG/DL
CHLORIDE SERPL-SCNC: 103 MMOL/L
CO2 SERPL-SCNC: 24 MMOL/L
COLOR: NORMAL
CREAT SERPL-MCNC: 0.68 MG/DL
CREAT SPEC-SCNC: 86 MG/DL
CREAT/PROT UR: 0.1 RATIO
CRP SERPL-MCNC: <3 MG/L
DEPRECATED KAPPA LC FREE/LAMBDA SER: 0.78 RATIO
DSDNA AB SER-ACNC: 71 IU/ML
EOSINOPHIL # BLD AUTO: 0.05 K/UL
EOSINOPHIL NFR BLD AUTO: 0.8 %
ERYTHROCYTE [SEDIMENTATION RATE] IN BLOOD BY WESTERGREN METHOD: 4 MM/HR
GAMMA GLOB FLD ELPH-MCNC: 1.2 G/DL
GAMMA GLOB MFR SERPL ELPH: 17 %
GLUCOSE QUALITATIVE U: NEGATIVE
GLUCOSE SERPL-MCNC: 87 MG/DL
HCT VFR BLD CALC: 40.8 %
HGB BLD-MCNC: 14.1 G/DL
IGA SER QL IEP: 269 MG/DL
IGG SER QL IEP: 1205 MG/DL
IGM SER QL IEP: 179 MG/DL
IMM GRANULOCYTES NFR BLD AUTO: 0.2 %
INTERPRETATION SERPL IEP-IMP: NORMAL
KAPPA LC CSF-MCNC: 1.74 MG/DL
KAPPA LC SERPL-MCNC: 1.36 MG/DL
KETONES URINE: NEGATIVE
LEUKOCYTE ESTERASE URINE: NEGATIVE
LYMPHOCYTES # BLD AUTO: 2.27 K/UL
LYMPHOCYTES NFR BLD AUTO: 35.5 %
M PROTEIN SPEC IFE-MCNC: NORMAL
MAN DIFF?: NORMAL
MCHC RBC-ENTMCNC: 29.6 PG
MCHC RBC-ENTMCNC: 34.6 GM/DL
MCV RBC AUTO: 85.5 FL
MONOCYTES # BLD AUTO: 0.5 K/UL
MONOCYTES NFR BLD AUTO: 7.8 %
NEUTROPHILS # BLD AUTO: 3.52 K/UL
NEUTROPHILS NFR BLD AUTO: 54.9 %
NITRITE URINE: NEGATIVE
PH URINE: 6
PLATELET # BLD AUTO: 327 K/UL
POTASSIUM SERPL-SCNC: 4.3 MMOL/L
PROT SERPL-MCNC: 7.3 G/DL
PROT UR-MCNC: 6 MG/DL
PROTEIN URINE: ABNORMAL
RBC # BLD: 4.77 M/UL
RBC # FLD: 12.3 %
SODIUM SERPL-SCNC: 141 MMOL/L
SPECIFIC GRAVITY URINE: 1.01
TSH SERPL-ACNC: 1.63 UIU/ML
UROBILINOGEN URINE: NORMAL
WBC # FLD AUTO: 6.4 K/UL

## 2021-05-29 ENCOUNTER — OUTPATIENT (OUTPATIENT)
Dept: OUTPATIENT SERVICES | Facility: HOSPITAL | Age: 31
LOS: 1 days | End: 2021-05-29
Payer: COMMERCIAL

## 2021-05-29 ENCOUNTER — APPOINTMENT (OUTPATIENT)
Dept: ULTRASOUND IMAGING | Facility: CLINIC | Age: 31
End: 2021-05-29
Payer: COMMERCIAL

## 2021-05-29 DIAGNOSIS — I70.90 UNSPECIFIED ATHEROSCLEROSIS: ICD-10-CM

## 2021-05-29 PROCEDURE — 93930 UPPER EXTREMITY STUDY: CPT

## 2021-05-29 PROCEDURE — 93930 UPPER EXTREMITY STUDY: CPT | Mod: 26

## 2021-06-08 ENCOUNTER — APPOINTMENT (OUTPATIENT)
Dept: NEUROLOGY | Facility: CLINIC | Age: 31
End: 2021-06-08

## 2021-06-10 ENCOUNTER — EMERGENCY (EMERGENCY)
Facility: HOSPITAL | Age: 31
LOS: 1 days | Discharge: HOME | End: 2021-06-10
Attending: EMERGENCY MEDICINE
Payer: COMMERCIAL

## 2021-06-10 ENCOUNTER — APPOINTMENT (OUTPATIENT)
Dept: RHEUMATOLOGY | Facility: CLINIC | Age: 31
End: 2021-06-10
Payer: COMMERCIAL

## 2021-06-10 VITALS
TEMPERATURE: 98 F | OXYGEN SATURATION: 98 % | RESPIRATION RATE: 19 BRPM | SYSTOLIC BLOOD PRESSURE: 117 MMHG | WEIGHT: 143.08 LBS | HEART RATE: 79 BPM | HEIGHT: 59 IN | DIASTOLIC BLOOD PRESSURE: 81 MMHG

## 2021-06-10 VITALS
HEART RATE: 92 BPM | OXYGEN SATURATION: 98 % | SYSTOLIC BLOOD PRESSURE: 107 MMHG | RESPIRATION RATE: 18 BRPM | DIASTOLIC BLOOD PRESSURE: 73 MMHG | TEMPERATURE: 98 F

## 2021-06-10 VITALS
BODY MASS INDEX: 28.83 KG/M2 | HEART RATE: 80 BPM | DIASTOLIC BLOOD PRESSURE: 79 MMHG | HEIGHT: 59 IN | WEIGHT: 143 LBS | SYSTOLIC BLOOD PRESSURE: 115 MMHG | OXYGEN SATURATION: 99 % | TEMPERATURE: 98.3 F | RESPIRATION RATE: 16 BRPM

## 2021-06-10 LAB
ALBUMIN SERPL ELPH-MCNC: 4.5 G/DL — SIGNIFICANT CHANGE UP (ref 3.3–5)
ALP SERPL-CCNC: 60 U/L — SIGNIFICANT CHANGE UP (ref 40–120)
ALT FLD-CCNC: 10 U/L — SIGNIFICANT CHANGE UP (ref 10–45)
APTT BLD: 54.3 SEC — HIGH (ref 27.5–35.5)
AST SERPL-CCNC: 16 U/L — SIGNIFICANT CHANGE UP (ref 10–40)
BASOPHILS # BLD AUTO: 0.04 K/UL — SIGNIFICANT CHANGE UP (ref 0–0.2)
BASOPHILS NFR BLD AUTO: 0.6 % — SIGNIFICANT CHANGE UP (ref 0–2)
BILIRUB SERPL-MCNC: 0.3 MG/DL — SIGNIFICANT CHANGE UP (ref 0.2–1.2)
BUN SERPL-MCNC: 10 MG/DL — SIGNIFICANT CHANGE UP (ref 7–23)
CALCIUM SERPL-MCNC: 9.9 MG/DL — SIGNIFICANT CHANGE UP (ref 8.4–10.5)
CHLORIDE SERPL-SCNC: 104 MMOL/L — SIGNIFICANT CHANGE UP (ref 96–108)
CO2 SERPL-SCNC: 23 MMOL/L — SIGNIFICANT CHANGE UP (ref 22–31)
CREAT SERPL-MCNC: 0.66 MG/DL — SIGNIFICANT CHANGE UP (ref 0.5–1.3)
EOSINOPHIL # BLD AUTO: 0.03 K/UL — SIGNIFICANT CHANGE UP (ref 0–0.5)
EOSINOPHIL NFR BLD AUTO: 0.5 % — SIGNIFICANT CHANGE UP (ref 0–6)
GLUCOSE SERPL-MCNC: 113 MG/DL — HIGH (ref 70–99)
HCT VFR BLD CALC: 39.4 % — SIGNIFICANT CHANGE UP (ref 34.5–45)
HGB BLD-MCNC: 13.8 G/DL — SIGNIFICANT CHANGE UP (ref 11.5–15.5)
HIV 1 & 2 AB SERPL IA.RAPID: SIGNIFICANT CHANGE UP
IMM GRANULOCYTES NFR BLD AUTO: 0.2 % — SIGNIFICANT CHANGE UP (ref 0–1.5)
INR BLD: 1.04 RATIO — SIGNIFICANT CHANGE UP (ref 0.88–1.16)
LYMPHOCYTES # BLD AUTO: 1.68 K/UL — SIGNIFICANT CHANGE UP (ref 1–3.3)
LYMPHOCYTES # BLD AUTO: 26.4 % — SIGNIFICANT CHANGE UP (ref 13–44)
MCHC RBC-ENTMCNC: 30.1 PG — SIGNIFICANT CHANGE UP (ref 27–34)
MCHC RBC-ENTMCNC: 35 GM/DL — SIGNIFICANT CHANGE UP (ref 32–36)
MCV RBC AUTO: 86 FL — SIGNIFICANT CHANGE UP (ref 80–100)
MONOCYTES # BLD AUTO: 0.46 K/UL — SIGNIFICANT CHANGE UP (ref 0–0.9)
MONOCYTES NFR BLD AUTO: 7.2 % — SIGNIFICANT CHANGE UP (ref 2–14)
NEUTROPHILS # BLD AUTO: 4.15 K/UL — SIGNIFICANT CHANGE UP (ref 1.8–7.4)
NEUTROPHILS NFR BLD AUTO: 65.1 % — SIGNIFICANT CHANGE UP (ref 43–77)
NRBC # BLD: 0 /100 WBCS — SIGNIFICANT CHANGE UP (ref 0–0)
PLATELET # BLD AUTO: 302 K/UL — SIGNIFICANT CHANGE UP (ref 150–400)
POTASSIUM SERPL-MCNC: 3.8 MMOL/L — SIGNIFICANT CHANGE UP (ref 3.5–5.3)
POTASSIUM SERPL-SCNC: 3.8 MMOL/L — SIGNIFICANT CHANGE UP (ref 3.5–5.3)
PROT SERPL-MCNC: 7.5 G/DL — SIGNIFICANT CHANGE UP (ref 6–8.3)
PROTHROM AB SERPL-ACNC: 12.5 SEC — SIGNIFICANT CHANGE UP (ref 10.6–13.6)
RBC # BLD: 4.58 M/UL — SIGNIFICANT CHANGE UP (ref 3.8–5.2)
RBC # FLD: 12.2 % — SIGNIFICANT CHANGE UP (ref 10.3–14.5)
SARS-COV-2 RNA SPEC QL NAA+PROBE: SIGNIFICANT CHANGE UP
SODIUM SERPL-SCNC: 139 MMOL/L — SIGNIFICANT CHANGE UP (ref 135–145)
WBC # BLD: 6.37 K/UL — SIGNIFICANT CHANGE UP (ref 3.8–10.5)
WBC # FLD AUTO: 6.37 K/UL — SIGNIFICANT CHANGE UP (ref 3.8–10.5)

## 2021-06-10 PROCEDURE — 99214 OFFICE O/P EST MOD 30 MIN: CPT

## 2021-06-10 PROCEDURE — 86703 HIV-1/HIV-2 1 RESULT ANTBDY: CPT

## 2021-06-10 PROCEDURE — U0003: CPT

## 2021-06-10 PROCEDURE — 93925 LOWER EXTREMITY STUDY: CPT

## 2021-06-10 PROCEDURE — 80053 COMPREHEN METABOLIC PANEL: CPT

## 2021-06-10 PROCEDURE — 93970 EXTREMITY STUDY: CPT | Mod: 26

## 2021-06-10 PROCEDURE — 93925 LOWER EXTREMITY STUDY: CPT | Mod: 26

## 2021-06-10 PROCEDURE — 85610 PROTHROMBIN TIME: CPT

## 2021-06-10 PROCEDURE — 99284 EMERGENCY DEPT VISIT MOD MDM: CPT | Mod: 25

## 2021-06-10 PROCEDURE — 85025 COMPLETE CBC W/AUTO DIFF WBC: CPT

## 2021-06-10 PROCEDURE — 99284 EMERGENCY DEPT VISIT MOD MDM: CPT

## 2021-06-10 PROCEDURE — 75635 CT ANGIO ABDOMINAL ARTERIES: CPT

## 2021-06-10 PROCEDURE — 85730 THROMBOPLASTIN TIME PARTIAL: CPT

## 2021-06-10 PROCEDURE — 93970 EXTREMITY STUDY: CPT

## 2021-06-10 PROCEDURE — 84702 CHORIONIC GONADOTROPIN TEST: CPT

## 2021-06-10 PROCEDURE — 99213 OFFICE O/P EST LOW 20 MIN: CPT | Mod: GC

## 2021-06-10 PROCEDURE — 75635 CT ANGIO ABDOMINAL ARTERIES: CPT | Mod: 26,MA

## 2021-06-10 PROCEDURE — 99072 ADDL SUPL MATRL&STAF TM PHE: CPT

## 2021-06-10 NOTE — ED PROVIDER NOTE - OBJECTIVE STATEMENT
31 yo F with hx of Lupus c/b RUE ulnar arterial clot (previously on Warfarin) p/w RLE pain and weakness. States she noticed the symptoms last night. Denies injury to the area. Saw her Rheumatologist today and was sent in for concern for clot. Denies numbness/tingling sensation. Has been able to ambulate but does notice weakness of the ankle and knee motion. Previously on Warfarin however has been decreasing frequency (last dose 2 weeks ago) due to issues with insurance and side effects. Feels that the R leg is colder than the L. Also reports pain to the the LLE, denies weakness. Denies chest pain, sob, palpitations.

## 2021-06-10 NOTE — CONSULT NOTE ADULT - ASSESSMENT
30F with hx of Lupus, antiphospholipid syndrome, R ulnar artery occlusion sent from rheumatologist for LE pain (R>L) for evaluation for acute limb ischemia. Patient with palpable pedal pulses in LLE and palpable R PT, +AT/peroneal signals with motor sensory grossly intact, making acute lime ischemia less likely    Recommendation  - please obtain CT angio of aorta with run-off  - follow up arterial duplex of LE   - will follow  - discussed with vascular surgery fellow    Vascular surgery  [id number]

## 2021-06-10 NOTE — CHART NOTE - NSCHARTNOTEFT_GEN_A_CORE
patient seen and examined in the vascular lab.  Palpable Femoral, Popliteal and pedal pulses on both lower extremity.  No color change  No sensory symptoms  Motor right 4/5- left 5/5  Unlikely vascular etiology for leg pain and weakness.  No concern for Acute limb ischemia since symptoms have been going on for 1 day and have not worsened.  work up as per ED
Patient presenting with bilateral lower extremity weakness with workup showing no clinical findings indicative of acute limb ischemia and CTA imaging showing no occlusions or arterial etiology for symptoms. Unlikely to be vascular etiology. Patient with history of right ulnar artery occlusion without worsening symptoms.     - Should follow up with Dr. Hansen as outpatient  - No indications for vascular surgery intervention   - Care and plan per ED    Vascular Surgery [id number]

## 2021-06-10 NOTE — ED PROVIDER NOTE - NSFOLLOWUPINSTRUCTIONS_ED_ALL_ED_FT
You were evaluated in the Emergency Department for LOWER LEG PAIN.     There are no signs of emergency conditions requiring admission to the hospital on today's workup.      We recommend that you:  1. See your RHEUMATOLOGIST within the next 1-3 DAYS for follow up.  Bring a copy of your discharge paperwork (including any test results) to your doctor.    2. See the VASCULAR SURGEON within the next 1-3 DAYS for follow up.  Bring a copy of your discharge paperwork (including any test results) to your doctor.    3. For pain you can take ibuprofen (Motrin, Advil) or acetaminophen (Tylenol) as needed, as directed on packaging.       *** Return immediately if you have any other new/concerning symptoms. ***

## 2021-06-10 NOTE — ED PROVIDER NOTE - CARE PROVIDER_API CALL
Edmund Hansen)  Vascular Surgery  [address], [address] [place] [address]  Phone: [phone]  Fax: [phone]  Follow Up Time: 1-3 Days

## 2021-06-10 NOTE — ED ADULT NURSE NOTE - OBJECTIVE STATEMENT
Pt with hx of SLE presents for eval of pain below both knees and a sensation of weakness in both lower legs, although she is able to walk.  No obvious swelling present, pedal pulses are present bilaterally.  She has clot in right arm for which she is supposed to be taking Warfarin, but admits to being non-compliant with the med due to its side effects, states last dose was 2 wks ago.

## 2021-06-10 NOTE — ED PROVIDER NOTE - CLINICAL SUMMARY MEDICAL DECISION MAKING FREE TEXT BOX
31 yo F with Lupus c/b RUE ulnar arterial clot, not currently taking Warfarin (last dose 2 weeks ago). R DP unable to be palpated, unable to appreciate with Doppler. Mild strength deficit of the RLE appreciated in comparison to the L. Concerning for arterial vs venous clot in the setting of hypercoagulable patient not on AC. Plan for labs, coags, duplexes, surgery consult, and CT angio. 29 yo F with Lupus c/b RUE ulnar arterial clot, not currently taking Warfarin (last dose 2 weeks ago). R DP unable to be palpated, unable to appreciate with Doppler. Mild strength deficit of the RLE appreciated in comparison to the L. Concerning for arterial vs venous clot in the setting of hypercoagulable patient not on AC. Plan for labs, coags, duplexes, surgery consult, and CT angio.    LARISA Antunez MD: Pt is a 29 y/o female with PMH lupus, 31 yo F with Lupus c/b RUE ulnar arterial clot, not currently taking Warfarin (last dose 2 weeks ago). R DP unable to be palpated, unable to appreciate with Doppler. Mild strength deficit of the RLE appreciated in comparison to the L. Concerning for arterial vs venous clot in the setting of hypercoagulable patient not on AC. Plan for labs, coags, duplexes, surgery consult, and CT angio.    LARISA Antunez MD: Pt is a 29 y/o female with a PMH of Lupus c/b RUE ulnar arterial clot (previously on Warfarin, discontinued ~2 weeks ago) who p/w c/o RLE pain and paresthesias. Pt states that she noticed sx last night, saw rheumatologist today and was sent in for concern for possible clot. Pt still ambulating. Denies CP/SOB. Ddx includes, however, is not limited to: DVT, arterial embolus, other. Plan: preop labs, LE arterial and venous dopplers, STAT vascular surgery consult

## 2021-06-10 NOTE — ED PROVIDER NOTE - PATIENT PORTAL LINK FT
You can access the FollowMyHealth Patient Portal offered by [facility] by registering at the following website: [email or web address]. By joining [employer]’s FollowMyHealth portal, you will also be able to view your health information using other applications (apps) compatible with our system.

## 2021-06-10 NOTE — ED ADULT TRIAGE NOTE - CHIEF COMPLAINT QUOTE
c/o jeannine lower leg dull pain x yesterday, hx SLE & antiphospholipid syndrome, hx RUE arterial clot - on warfarin, (denies long distance travel/+nonsmoker/no oral contraceptives)

## 2021-06-10 NOTE — ED PROVIDER NOTE - NS ED ROS FT
Constitutional:  (-) fever, (-) chills, (-) lethargy.  Eyes:  (-) eye pain (-) visual changes.  ENMT: (-) nasal discharge, (-) sore throat. (-) neck pain or stiffness.  Cardiac: (-) chest pain (-) palpitations.  Respiratory:  (-) cough (-) respiratory distress.   GI:  (-) nausea (-) vomiting (-) diarrhea (-) abdominal pain.  :  (-) dysuria (-) frequency (-) burning.  MS:  (-) back pain (-) joint pain.  Neuro: (+) R leg weakness (-) headache (-) numbness (-) tingling.   Skin:  (-) rash  Except as documented in the HPI,  all other systems are negative.

## 2021-06-10 NOTE — CONSULT NOTE ADULT - ATTENDING COMMENTS
Seen ex'ed w fellow  Tests already done.    New acute onset LE pain x 1 day- Circumferential calves only.  Not in feet/toes.  L LE pain resolved.  No obvious etiology.  No swelling/discoloration/neuro defs    Previously seen for R ulnar art occ.  Asymp  SLE.  ?hypercoag    Currently no thinners/antiplt meds.  Denies smoking.  No OC's    AAO, NAD, Diana  UEs: L wnl.  R lnar pulse NP.  +brach/rad pulses.  Hand/fingers ok.  ABd: Bemign  LEs:   Equally warm/norm color.  No cords/ulcers/cellulitis.  Feet/toes appear equally norm.  +fem/pop/PT b/l, L DP palp.  R DP NP  R calf Soft, No sig tenderness.    vUS: No DVT  Art US: ok x non-vis R DP  CTA: As above  KAYLA's ok.    A/P:   Calf pain  Doubt vasc etiology.  Absent DP not culprit.  Previous h/o R ulnar art occ.  Underlying SLE/APS    Rec:   Further rheum/heme/neuromuscskeletal freeman/mgmt as indicated.  ?AC/antiplt meds if indicated.  Can fu in office for surveillance

## 2021-06-10 NOTE — CONSULT NOTE ADULT - SUBJECTIVE AND OBJECTIVE BOX
VASCULAR SURGERY CONSULT NOTE    Patient is a 30y old  Female who presents with a chief complaint of bilateral lower leg weakness    HPI:  30 year old female with hx of Lupus, antiphospholipid syndrome, right ulnar artery occlusion (2018, Dr. Hansen) presents with bilateral lower leg weakness started yesterday afternoon. Reports some pain (R worse than L). Denies numbness or tingling. Denies changes in sensation. Patient reports she was started on Warfarin since right ulnar artery occlusion but stopped ~3 weeks ago till to side effects of headache. She also reports taking Warfarin 2-3 times a weeks prior to stopping. Patient was sent from her rheumatologist with concern for acute limb ischemia.    10-points review of system performed with pertinent negative and positive findings documented in the HPI     PAST MEDICAL & SURGICAL HISTORY:  Lupus erythematosus, unspecified form    Migraines    No significant past surgical history    FAMILY HISTORY:  : Family history not pertinent as reviewed with the patient and family    SOCIAL HISTORY: No pertinent social history    MEDICATIONS  (STANDING):    MEDICATIONS  (PRN):    Allergies    No Known Allergies    Intolerances    Vital Signs Last 24 Hrs  T(C): 36.9 (10 Ta 2021 12:07), Max: 36.9 (10 Ta 2021 12:07)  T(F): 98.4 (10 Ta 2021 12:07), Max: 98.4 (10 Ta 2021 12:07)  HR: 79 (10 Ta 2021 12:07) (79 - 79)  BP: 117/81 (10 Ta 2021 12:07) (117/81 - 117/81)  BP(mean): --  RR: 19 (10 Ta 2021 12:07) (19 - 19)  SpO2: 98% (10 Ta 2021 12:07) (98% - 98%)  Daily Height in cm: 149.86 (10 Ta 2021 12:07)    Daily     Exam:  General: resting in stretcher, NAD  Resp: nonlabored breathing, satting well on RA  Ext:   RUE: palpable radial, brachial, axillary pulses; no ulnar doppler signal; +doppler palmar arch signal; motor sensory grossly intact  LUE: palpable radial and ulnar pulses; motor sensory grossly intact  RLE: palpable femoral, popliteal, AT, PT pulses; +peroneal signal; motor 4/5 strength; sensory grossly intact  LLE: palpable femoral, popliteal, DP. PT pulses; motor sensory grossly intacy  Neuro: AAOx4                        13.8   6.37  )-----------( 302      ( 10 Ta 2021 14:37 )             39.4     06-10    139  |  104  |  10  ----------------------------<  113<H>  3.8   |  23  |  0.66    Ca    9.9      10 Ta 2021 14:37    TPro  7.5  /  Alb  4.5  /  TBili  0.3  /  DBili  x   /  AST  16  /  ALT  10  /  AlkPhos  60  06-10    PT/INR - ( 10 Ta 2021 14:37 )   PT: 12.5 sec;   INR: 1.04 ratio    PTT - ( 10 Ta 2021 14:37 )  PTT:54.3 sec    IMAGING STUDIES:

## 2021-06-10 NOTE — ED PROVIDER NOTE - PROGRESS NOTE DETAILS
ap- spoke w/ Ivan from Sx. ok to follow up as outpt as imaging show no signs of acute limb ischemia, recommending follow up w/ rheumatology and Dr. Farmer

## 2021-06-10 NOTE — ED PROVIDER NOTE - PHYSICAL EXAMINATION
Gen: NAD, AAOx3, non-toxic appearing.  Head: normal appearing.  HEENT: normal conjunctiva, oral mucosa moist.  Lung:  no respiratory distress, speaking in full sentences, clear to ascultation bilaterally.     CV: regular rate and rhythm. Unable to palpate R DP pulse. 3+ L DP.   Abd: soft, ND, NT.  MSK: no visible deformities. LE without edema or palor. No appreciated skin color changes. Sensation intact. 4/5 strength of the RLE at the ankle and knee.   Neuro: No focal deficits.  Skin: Warm.  Psych: normal affect.

## 2021-06-13 NOTE — HISTORY OF PRESENT ILLNESS
[___ Week(s) Ago] : [unfilled] week(s) ago [FreeTextEntry1] : new onset of bilateral leg weakness and buckling - X 3 days\par worsening - has difficulty going up the stairs\par no rashes, fevers or change in skin coloration [FreeTextEntry3] : 2018 [FreeTextEntry7] : DsDNA [FreeTextEntry4] : hydroxychloroquine - coumadin [FreeTextEntry6] : APS with right arm arterial occlusion [Skin Lesions] : no lesions [Muscle Weakness] : no muscle weakness [None] : The patient is currently asymptomatic

## 2021-06-13 NOTE — ASSESSMENT
[FreeTextEntry1] : 28 year-old female with red rash over feet and hand with severe pain - appearance of vasculitis\par Labs with positive DAMON and DSDNA, triple positive APS\par \par Acute visit for bilateral leg weakness\par D.dx is myositis X another arterial clot - patient symptoms mimic her previous arterial ulnar clot - referral to the ER for further assessment of possible clot\par if no clot will start prednisone 40 mg to help with leg weakness\par \par \par 1. SLE - on Plaquenil 200 mg BID - no side effects -no rashes or active symptoms - labs are stable with low titer DsDNA and normal complements continue with plaquenil 400 mg daily - no active symptoms - continue with current regimen\par 2. 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 headaches - twice a week - referral to neuro\par 4. post covid 19 - January - recovered fully\par \par \par I reviewed previous labs results with patients.\par Laboratory tests ordered today\par Diagnosis and Prognosis discussed\par Continue with current medications\par medications refilled\par F/u 2 months\par

## 2021-06-13 NOTE — PHYSICAL EXAM
[General Appearance - Alert] : alert [General Appearance - In No Acute Distress] : in no acute distress [Outer Ear] : the ears and nose were normal in appearance [Oropharynx] : the oropharynx was normal [Neck Appearance] : the appearance of the neck was normal [Neck Cervical Mass (___cm)] : no neck mass was observed [Jugular Venous Distention Increased] : there was no jugular-venous distention [Thyroid Diffuse Enlargement] : the thyroid was not enlarged [Thyroid Nodule] : there were no palpable thyroid nodules [Auscultation Breath Sounds / Voice Sounds] : lungs were clear to auscultation bilaterally [Heart Rate And Rhythm] : heart rate was normal and rhythm regular [Heart Sounds] : normal S1 and S2 [Heart Sounds Gallop] : no gallops [Murmurs] : no murmurs [Heart Sounds Pericardial Friction Rub] : no pericardial rub [Bowel Sounds] : normal bowel sounds [Abdomen Soft] : soft [Abdomen Tenderness] : non-tender [Abdomen Mass (___ Cm)] : no abdominal mass palpated [Cervical Lymph Nodes Enlarged Posterior Bilaterally] : posterior cervical [Cervical Lymph Nodes Enlarged Anterior Bilaterally] : anterior cervical [Supraclavicular Lymph Nodes Enlarged Bilaterally] : supraclavicular [No CVA Tenderness] : no ~M costovertebral angle tenderness [No Spinal Tenderness] : no spinal tenderness [Abnormal Walk] : normal gait [Nail Clubbing] : no clubbing  or cyanosis of the fingernails [Musculoskeletal - Swelling] : no joint swelling seen [Motor Tone] : muscle strength and tone were normal [Skin Color & Pigmentation] : normal skin color and pigmentation [Skin Turgor] : normal skin turgor [] : no rash [FreeTextEntry1] : no rashes [Oriented To Time, Place, And Person] : oriented to person, place, and time [Impaired Insight] : insight and judgment were intact [Affect] : the affect was normal

## 2021-08-24 ENCOUNTER — RX RENEWAL (OUTPATIENT)
Age: 31
End: 2021-08-24

## 2021-08-24 ENCOUNTER — LABORATORY RESULT (OUTPATIENT)
Age: 31
End: 2021-08-24

## 2021-08-24 ENCOUNTER — APPOINTMENT (OUTPATIENT)
Dept: RHEUMATOLOGY | Facility: CLINIC | Age: 31
End: 2021-08-24
Payer: COMMERCIAL

## 2021-08-24 VITALS
RESPIRATION RATE: 16 BRPM | WEIGHT: 150 LBS | HEIGHT: 59 IN | TEMPERATURE: 97.7 F | SYSTOLIC BLOOD PRESSURE: 104 MMHG | OXYGEN SATURATION: 99 % | BODY MASS INDEX: 30.24 KG/M2 | HEART RATE: 72 BPM | DIASTOLIC BLOOD PRESSURE: 60 MMHG

## 2021-08-24 PROCEDURE — 99214 OFFICE O/P EST MOD 30 MIN: CPT

## 2021-08-26 NOTE — ED PROVIDER NOTE - NS ED NOTE AC HIGH RISK COUNTRIES
Message received from [facility] that they placed reorder. Will be to his home in 7-10 business days.    Called patient and left a detailed message on his voicemail in regards to above.    No

## 2021-08-27 LAB
ALBUMIN SERPL ELPH-MCNC: 4.6 G/DL
ALDOLASE SERPL-CCNC: 2.8 U/L
ALP BLD-CCNC: 65 U/L
ALT SERPL-CCNC: 8 U/L
ANION GAP SERPL CALC-SCNC: 13 MMOL/L
APPEARANCE: ABNORMAL
AST SERPL-CCNC: 13 U/L
BASOPHILS # BLD AUTO: 0.06 K/UL
BASOPHILS NFR BLD AUTO: 0.7 %
BILIRUB SERPL-MCNC: 0.4 MG/DL
BILIRUBIN URINE: NEGATIVE
BLOOD URINE: NEGATIVE
BUN SERPL-MCNC: 10 MG/DL
C3 SERPL-MCNC: 91 MG/DL
C4 SERPL-MCNC: 19 MG/DL
CALCIUM SERPL-MCNC: 9.3 MG/DL
CHLORIDE SERPL-SCNC: 103 MMOL/L
CK SERPL-CCNC: 64 U/L
CO2 SERPL-SCNC: 25 MMOL/L
COLOR: COLORLESS
COVID-19 NUCLEOCAPSID  GAM ANTIBODY INTERPRETATION: NEGATIVE
CREAT SERPL-MCNC: 0.68 MG/DL
CREAT SPEC-SCNC: 32 MG/DL
CREAT/PROT UR: NORMAL RATIO
CRP SERPL-MCNC: <3 MG/L
DEPRECATED KAPPA LC FREE/LAMBDA SER: 0.57 RATIO
DSDNA AB SER-ACNC: 68 IU/ML
EOSINOPHIL # BLD AUTO: 0.03 K/UL
EOSINOPHIL NFR BLD AUTO: 0.3 %
ERYTHROCYTE [SEDIMENTATION RATE] IN BLOOD BY WESTERGREN METHOD: 3 MM/HR
GLUCOSE QUALITATIVE U: NEGATIVE
GLUCOSE SERPL-MCNC: 91 MG/DL
HCT VFR BLD CALC: 39.5 %
HGB BLD-MCNC: 13.5 G/DL
IGA SER QL IEP: 215 MG/DL
IGG SER QL IEP: 969 MG/DL
IGM SER QL IEP: 171 MG/DL
IMM GRANULOCYTES NFR BLD AUTO: 0.3 %
IRON SATN MFR SERPL: 24 %
IRON SERPL-MCNC: 95 UG/DL
KAPPA LC CSF-MCNC: 1.75 MG/DL
KAPPA LC SERPL-MCNC: 0.99 MG/DL
KETONES URINE: NEGATIVE
LEUKOCYTE ESTERASE URINE: NEGATIVE
LYMPHOCYTES # BLD AUTO: 1.98 K/UL
LYMPHOCYTES NFR BLD AUTO: 21.5 %
MAN DIFF?: NORMAL
MCHC RBC-ENTMCNC: 29.9 PG
MCHC RBC-ENTMCNC: 34.2 GM/DL
MCV RBC AUTO: 87.4 FL
MONOCYTES # BLD AUTO: 0.74 K/UL
MONOCYTES NFR BLD AUTO: 8 %
MYOGLOBIN SERPL-MCNC: <21 NG/ML
NEUTROPHILS # BLD AUTO: 6.39 K/UL
NEUTROPHILS NFR BLD AUTO: 69.2 %
NITRITE URINE: NEGATIVE
PH URINE: 6.5
PLATELET # BLD AUTO: 334 K/UL
POTASSIUM SERPL-SCNC: 4.4 MMOL/L
PROT SERPL-MCNC: 7.1 G/DL
PROT UR-MCNC: <4 MG/DL
PROTEIN URINE: NORMAL
RBC # BLD: 4.52 M/UL
RBC # FLD: 13.1 %
SARS-COV-2 AB SERPL QL IA: 0.93 INDEX
SODIUM SERPL-SCNC: 141 MMOL/L
SPECIFIC GRAVITY URINE: 1.01
TIBC SERPL-MCNC: 392 UG/DL
TSH SERPL-ACNC: 2.92 UIU/ML
UIBC SERPL-MCNC: 297 UG/DL
UROBILINOGEN URINE: NORMAL
WBC # FLD AUTO: 9.23 K/UL

## 2021-09-02 LAB — DHEA-SULFATE, SERUM: 299 UG/DL

## 2021-09-15 LAB
EJ AB SER QL: NEGATIVE
ENA JO1 AB SER IA-ACNC: <20 UNITS
ENA PM/SCL AB SER-ACNC: <20 UNITS
ENA SM+RNP AB SER IA-ACNC: 83 UNITS
ENA SS-A IGG SER QL: <20 UNITS
FIBRILLARIN AB SER QL: NEGATIVE
KU AB SER QL: ABNORMAL
MDA-5 (P140)(CADM-140): <20 UNITS
MI2 AB SER QL: NEGATIVE
NXP-2 (P140): <20 UNITS
OJ AB SER QL: NEGATIVE
PL12 AB SER QL: NEGATIVE
PL7 AB SER QL: NEGATIVE
SRP AB SERPL QL: NEGATIVE
TIF GAMMA (P155/140): <20 UNITS
U2 SNRNP AB SER QL: NEGATIVE

## 2021-11-23 ENCOUNTER — APPOINTMENT (OUTPATIENT)
Dept: RHEUMATOLOGY | Facility: CLINIC | Age: 31
End: 2021-11-23
Payer: COMMERCIAL

## 2021-11-23 VITALS — SYSTOLIC BLOOD PRESSURE: 98 MMHG | DIASTOLIC BLOOD PRESSURE: 64 MMHG

## 2021-11-23 VITALS
RESPIRATION RATE: 16 BRPM | TEMPERATURE: 97.4 F | HEART RATE: 80 BPM | HEIGHT: 59 IN | WEIGHT: 150 LBS | BODY MASS INDEX: 30.24 KG/M2 | OXYGEN SATURATION: 98 %

## 2021-11-23 PROCEDURE — 99214 OFFICE O/P EST MOD 30 MIN: CPT

## 2021-11-23 RX ORDER — WARFARIN 2 MG/1
2 TABLET ORAL DAILY
Qty: 30 | Refills: 3 | Status: DISCONTINUED | COMMUNITY
Start: 2019-10-23 | End: 2021-11-23

## 2021-11-23 RX ORDER — WARFARIN 2.5 MG/1
2.5 TABLET ORAL DAILY
Qty: 30 | Refills: 3 | Status: DISCONTINUED | COMMUNITY
Start: 2019-10-23 | End: 2021-11-23

## 2021-11-23 RX ORDER — WARFARIN 4 MG/1
4 TABLET ORAL DAILY
Qty: 30 | Refills: 6 | Status: DISCONTINUED | COMMUNITY
Start: 2019-07-03 | End: 2021-11-23

## 2021-11-23 RX ORDER — WARFARIN 2.5 MG/1
2.5 TABLET ORAL DAILY
Qty: 30 | Refills: 3 | Status: DISCONTINUED | COMMUNITY
Start: 2021-03-11 | End: 2021-11-23

## 2021-11-30 ENCOUNTER — TRANSCRIPTION ENCOUNTER (OUTPATIENT)
Age: 31
End: 2021-11-30

## 2021-11-30 DIAGNOSIS — J02.9 ACUTE PHARYNGITIS, UNSPECIFIED: ICD-10-CM

## 2021-11-30 LAB
ALBUMIN SERPL ELPH-MCNC: 4.7 G/DL
ALP BLD-CCNC: 63 U/L
ALT SERPL-CCNC: 13 U/L
ANION GAP SERPL CALC-SCNC: 13 MMOL/L
APPEARANCE: CLEAR
AST SERPL-CCNC: 15 U/L
BASOPHILS # BLD AUTO: 0.04 K/UL
BASOPHILS NFR BLD AUTO: 0.5 %
BILIRUB SERPL-MCNC: 0.4 MG/DL
BILIRUBIN URINE: NEGATIVE
BLOOD URINE: NEGATIVE
BUN SERPL-MCNC: 9 MG/DL
C3 SERPL-MCNC: 99 MG/DL
C4 SERPL-MCNC: 15 MG/DL
CALCIUM SERPL-MCNC: 9.3 MG/DL
CHLORIDE SERPL-SCNC: 101 MMOL/L
CO2 SERPL-SCNC: 24 MMOL/L
COLOR: NORMAL
CREAT SERPL-MCNC: 0.65 MG/DL
CREAT SPEC-SCNC: 38 MG/DL
CREAT/PROT UR: NORMAL RATIO
CRP SERPL-MCNC: <3 MG/L
DSDNA AB SER-ACNC: 78 IU/ML
EOSINOPHIL # BLD AUTO: 0.06 K/UL
EOSINOPHIL NFR BLD AUTO: 0.8 %
ERYTHROCYTE [SEDIMENTATION RATE] IN BLOOD BY WESTERGREN METHOD: 3 MM/HR
GLUCOSE QUALITATIVE U: NEGATIVE
GLUCOSE SERPL-MCNC: 69 MG/DL
HCT VFR BLD CALC: 39 %
HGB BLD-MCNC: 13.4 G/DL
IMM GRANULOCYTES NFR BLD AUTO: 0.3 %
KETONES URINE: NEGATIVE
LEUKOCYTE ESTERASE URINE: NEGATIVE
LYMPHOCYTES # BLD AUTO: 2.03 K/UL
LYMPHOCYTES NFR BLD AUTO: 25.8 %
MAN DIFF?: NORMAL
MCHC RBC-ENTMCNC: 29.8 PG
MCHC RBC-ENTMCNC: 34.4 GM/DL
MCV RBC AUTO: 86.7 FL
MONOCYTES # BLD AUTO: 0.66 K/UL
MONOCYTES NFR BLD AUTO: 8.4 %
NEUTROPHILS # BLD AUTO: 5.05 K/UL
NEUTROPHILS NFR BLD AUTO: 64.2 %
NITRITE URINE: NEGATIVE
PH URINE: 6.5
PLATELET # BLD AUTO: 317 K/UL
POTASSIUM SERPL-SCNC: 4.3 MMOL/L
PROT SERPL-MCNC: 7.1 G/DL
PROT UR-MCNC: <4 MG/DL
PROTEIN URINE: NEGATIVE
RBC # BLD: 4.5 M/UL
RBC # FLD: 12.3 %
SODIUM SERPL-SCNC: 138 MMOL/L
SPECIFIC GRAVITY URINE: 1.01
UROBILINOGEN URINE: NORMAL
WBC # FLD AUTO: 7.86 K/UL

## 2021-12-23 ENCOUNTER — RX RENEWAL (OUTPATIENT)
Age: 31
End: 2021-12-23

## 2022-01-13 ENCOUNTER — NON-APPOINTMENT (OUTPATIENT)
Age: 32
End: 2022-01-13

## 2022-02-15 ENCOUNTER — LABORATORY RESULT (OUTPATIENT)
Age: 32
End: 2022-02-15

## 2022-02-15 ENCOUNTER — APPOINTMENT (OUTPATIENT)
Dept: RHEUMATOLOGY | Facility: CLINIC | Age: 32
End: 2022-02-15
Payer: COMMERCIAL

## 2022-02-15 VITALS
BODY MASS INDEX: 29.84 KG/M2 | WEIGHT: 148 LBS | DIASTOLIC BLOOD PRESSURE: 76 MMHG | HEIGHT: 59 IN | TEMPERATURE: 97.2 F | HEART RATE: 79 BPM | SYSTOLIC BLOOD PRESSURE: 115 MMHG | OXYGEN SATURATION: 96 %

## 2022-02-15 DIAGNOSIS — Z00.00 ENCOUNTER FOR GENERAL ADULT MEDICAL EXAMINATION W/OUT ABNORMAL FINDINGS: ICD-10-CM

## 2022-02-15 PROCEDURE — 99215 OFFICE O/P EST HI 40 MIN: CPT

## 2022-02-17 LAB
ALBUMIN SERPL ELPH-MCNC: 4.9 G/DL
ALP BLD-CCNC: 75 U/L
ALT SERPL-CCNC: 10 U/L
ANION GAP SERPL CALC-SCNC: 17 MMOL/L
APPEARANCE: CLEAR
AST SERPL-CCNC: 15 U/L
BASOPHILS # BLD AUTO: 0.07 K/UL
BASOPHILS NFR BLD AUTO: 0.9 %
BILIRUB SERPL-MCNC: 0.2 MG/DL
BILIRUBIN URINE: NEGATIVE
BLOOD URINE: ABNORMAL
BUN SERPL-MCNC: 14 MG/DL
C3 SERPL-MCNC: 93 MG/DL
C4 SERPL-MCNC: 16 MG/DL
CALCIUM SERPL-MCNC: 9.6 MG/DL
CHLORIDE SERPL-SCNC: 103 MMOL/L
CO2 SERPL-SCNC: 20 MMOL/L
COLOR: NORMAL
CREAT SERPL-MCNC: 0.72 MG/DL
CRP SERPL-MCNC: <3 MG/L
DEPRECATED KAPPA LC FREE/LAMBDA SER: 0.73 RATIO
DSDNA AB SER-ACNC: 56 IU/ML
EOSINOPHIL # BLD AUTO: 0.1 K/UL
EOSINOPHIL NFR BLD AUTO: 1.2 %
ERYTHROCYTE [SEDIMENTATION RATE] IN BLOOD BY WESTERGREN METHOD: 2 MM/HR
ESTIMATED AVERAGE GLUCOSE: 91 MG/DL
GLUCOSE QUALITATIVE U: NEGATIVE
GLUCOSE SERPL-MCNC: 91 MG/DL
HBA1C MFR BLD HPLC: 4.8 %
HCT VFR BLD CALC: 41.4 %
HGB BLD-MCNC: 14.1 G/DL
IGA SER QL IEP: 240 MG/DL
IGG SER QL IEP: 1008 MG/DL
IGM SER QL IEP: 162 MG/DL
IMM GRANULOCYTES NFR BLD AUTO: 0.2 %
KAPPA LC CSF-MCNC: 1.9 MG/DL
KAPPA LC SERPL-MCNC: 1.39 MG/DL
KETONES URINE: NEGATIVE
LEUKOCYTE ESTERASE URINE: NEGATIVE
LYMPHOCYTES # BLD AUTO: 2.65 K/UL
LYMPHOCYTES NFR BLD AUTO: 32.8 %
MAN DIFF?: NORMAL
MCHC RBC-ENTMCNC: 30.3 PG
MCHC RBC-ENTMCNC: 34.1 GM/DL
MCV RBC AUTO: 88.8 FL
MONOCYTES # BLD AUTO: 0.58 K/UL
MONOCYTES NFR BLD AUTO: 7.2 %
NEUTROPHILS # BLD AUTO: 4.66 K/UL
NEUTROPHILS NFR BLD AUTO: 57.7 %
NITRITE URINE: NEGATIVE
PH URINE: 6
PLATELET # BLD AUTO: 343 K/UL
POTASSIUM SERPL-SCNC: 4.2 MMOL/L
PROT SERPL-MCNC: 7.3 G/DL
PROTEIN URINE: NEGATIVE
RBC # BLD: 4.66 M/UL
RBC # FLD: 12.4 %
SODIUM SERPL-SCNC: 140 MMOL/L
SPECIFIC GRAVITY URINE: 1.02
TSH SERPL-ACNC: 2.49 UIU/ML
UROBILINOGEN URINE: NORMAL
WBC # FLD AUTO: 8.08 K/UL

## 2022-03-01 ENCOUNTER — OUTPATIENT (OUTPATIENT)
Dept: OUTPATIENT SERVICES | Facility: HOSPITAL | Age: 32
LOS: 1 days | End: 2022-03-01

## 2022-03-01 ENCOUNTER — APPOINTMENT (OUTPATIENT)
Dept: ULTRASOUND IMAGING | Facility: CLINIC | Age: 32
End: 2022-03-01
Payer: COMMERCIAL

## 2022-03-01 DIAGNOSIS — D68.61 ANTIPHOSPHOLIPID SYNDROME: ICD-10-CM

## 2022-03-01 PROCEDURE — 93930 UPPER EXTREMITY STUDY: CPT | Mod: 26

## 2022-03-08 ENCOUNTER — APPOINTMENT (OUTPATIENT)
Dept: GASTROENTEROLOGY | Facility: CLINIC | Age: 32
End: 2022-03-08
Payer: COMMERCIAL

## 2022-03-08 VITALS
WEIGHT: 148 LBS | SYSTOLIC BLOOD PRESSURE: 120 MMHG | BODY MASS INDEX: 29.84 KG/M2 | HEIGHT: 59 IN | DIASTOLIC BLOOD PRESSURE: 70 MMHG | HEART RATE: 76 BPM

## 2022-03-08 PROCEDURE — 99204 OFFICE O/P NEW MOD 45 MIN: CPT

## 2022-03-08 NOTE — HISTORY OF PRESENT ILLNESS
[de-identified] : 30yo female with bloating, altered bowel habits\par \par Over last several months, she has developed increased bloating and lower abdominal discomfort. This is often associated with altered looser BMs. Some improvement in between episodes but not truly normal BMs\par No bleeding or clear inciting or mitigating factors

## 2022-03-08 NOTE — ASSESSMENT
[FreeTextEntry1] : 32yo female with bloating and altered bowerl habits\par \par Will check labs\par FODMAP diet and food diary\par \par will change to align empirically\par add simethicone 250mg bid

## 2022-03-08 NOTE — PHYSICAL EXAM
[General Appearance - Alert] : alert [General Appearance - In No Acute Distress] : in no acute distress [Auscultation Breath Sounds / Voice Sounds] : lungs were clear to auscultation bilaterally [Heart Rate And Rhythm] : heart rate was normal and rhythm regular [Heart Sounds] : normal S1 and S2 [Heart Sounds Gallop] : no gallops [Murmurs] : no murmurs [Heart Sounds Pericardial Friction Rub] : no pericardial rub [Bowel Sounds] : normal bowel sounds [Abdomen Tenderness] : non-tender [Abdomen Soft] : soft [] : no hepato-splenomegaly [Abdomen Mass (___ Cm)] : no abdominal mass palpated [Abnormal Walk] : normal gait [Nail Clubbing] : no clubbing  or cyanosis of the fingernails [Musculoskeletal - Swelling] : no joint swelling seen [Motor Tone] : muscle strength and tone were normal [Oriented To Time, Place, And Person] : oriented to person, place, and time [Impaired Insight] : insight and judgment were intact [Affect] : the affect was normal

## 2022-03-14 ENCOUNTER — RESULT CHARGE (OUTPATIENT)
Age: 32
End: 2022-03-14

## 2022-03-15 ENCOUNTER — NON-APPOINTMENT (OUTPATIENT)
Age: 32
End: 2022-03-15

## 2022-03-15 DIAGNOSIS — Z78.9 OTHER SPECIFIED HEALTH STATUS: ICD-10-CM

## 2022-03-15 RX ORDER — FLUCONAZOLE 150 MG/1
150 TABLET ORAL
Qty: 1 | Refills: 0 | Status: COMPLETED | COMMUNITY
Start: 2019-07-02 | End: 2022-03-15

## 2022-03-15 RX ORDER — METRONIDAZOLE 7.5 MG/G
0.75 GEL VAGINAL
Qty: 70 | Refills: 0 | Status: COMPLETED | COMMUNITY
Start: 2019-07-25 | End: 2022-03-15

## 2022-03-15 RX ORDER — AMOXICILLIN 875 MG/1
875 TABLET, FILM COATED ORAL
Qty: 10 | Refills: 0 | Status: COMPLETED | COMMUNITY
Start: 2021-11-30 | End: 2022-03-15

## 2022-03-15 RX ORDER — CLINDAMYCIN PHOSPHATE 20 MG/G
2 CREAM VAGINAL
Qty: 40 | Refills: 0 | Status: COMPLETED | COMMUNITY
Start: 2019-05-30 | End: 2022-03-15

## 2022-03-15 RX ORDER — PREDNISONE 20 MG/1
20 TABLET ORAL
Qty: 60 | Refills: 3 | Status: COMPLETED | COMMUNITY
Start: 2021-06-10 | End: 2022-03-15

## 2022-03-15 RX ORDER — METRONIDAZOLE 500 MG/1
500 TABLET ORAL
Qty: 14 | Refills: 0 | Status: COMPLETED | COMMUNITY
Start: 2021-07-15 | End: 2022-03-15

## 2022-03-16 ENCOUNTER — NON-APPOINTMENT (OUTPATIENT)
Age: 32
End: 2022-03-16

## 2022-03-16 ENCOUNTER — APPOINTMENT (OUTPATIENT)
Dept: CARDIOLOGY | Facility: CLINIC | Age: 32
End: 2022-03-16
Payer: COMMERCIAL

## 2022-03-16 VITALS
HEART RATE: 83 BPM | DIASTOLIC BLOOD PRESSURE: 81 MMHG | HEIGHT: 59 IN | SYSTOLIC BLOOD PRESSURE: 118 MMHG | OXYGEN SATURATION: 97 % | RESPIRATION RATE: 16 BRPM | BODY MASS INDEX: 30.04 KG/M2 | WEIGHT: 149 LBS

## 2022-03-16 PROCEDURE — 99214 OFFICE O/P EST MOD 30 MIN: CPT

## 2022-03-16 PROCEDURE — 93000 ELECTROCARDIOGRAM COMPLETE: CPT

## 2022-03-16 NOTE — ASSESSMENT
[FreeTextEntry1] : \par A/P:\par \par *Chest pain\par -atypical but has h/o lupus\par & sigificant risk of CAD w/ this.\par \par -Coronary CT angio\par -Echo\par \par Return in 2-3 weeks to review results.

## 2022-03-16 NOTE — HISTORY OF PRESENT ILLNESS
[FreeTextEntry1] : 32 y/o \par \par Referred by BRENDAN Álvarez in Rheum clinic for chest pain.\par \par 32 y/o\par \par *SLE-on hydroxychloroquine\par *No prior cardiac history \par \par Reviewed clinic note: pt reported new onset chest discomfort.\par Tightness random not related to activity.  Occurs twice a month.\par \par Interviewed pt:\par Started a couple of months ago.\par occurs 2-3x week. \par Two different pains - one sharp stabbing pain, lasting 1 sec.\par No precipitating factors, no relation to eating position inspiration palpation.\par \par Another pressure discomfort. duration 1 hour.\par No precipitating factors, no relation to eating position inspiration palpation.\par No nausea/vomitng during either discomfort.\par No dyspnea, no palpitations, no lightheadness, no syncope.\par \par No regular exercise. 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

## 2022-03-22 ENCOUNTER — RX RENEWAL (OUTPATIENT)
Age: 32
End: 2022-03-22

## 2022-03-28 ENCOUNTER — TRANSCRIPTION ENCOUNTER (OUTPATIENT)
Age: 32
End: 2022-03-28

## 2022-03-28 ENCOUNTER — APPOINTMENT (OUTPATIENT)
Dept: CARDIOLOGY | Facility: CLINIC | Age: 32
End: 2022-03-28

## 2022-04-14 ENCOUNTER — APPOINTMENT (OUTPATIENT)
Dept: CT IMAGING | Facility: CLINIC | Age: 32
End: 2022-04-14
Payer: COMMERCIAL

## 2022-04-14 ENCOUNTER — OUTPATIENT (OUTPATIENT)
Dept: OUTPATIENT SERVICES | Facility: HOSPITAL | Age: 32
LOS: 1 days | End: 2022-04-14
Payer: COMMERCIAL

## 2022-04-14 DIAGNOSIS — R07.89 OTHER CHEST PAIN: ICD-10-CM

## 2022-04-14 PROCEDURE — 73206 CT ANGIO UPR EXTRM W/O&W/DYE: CPT | Mod: 26,RT

## 2022-04-14 PROCEDURE — 75574 CT ANGIO HRT W/3D IMAGE: CPT

## 2022-04-14 PROCEDURE — 75574 CT ANGIO HRT W/3D IMAGE: CPT | Mod: 26

## 2022-04-14 PROCEDURE — 73206 CT ANGIO UPR EXTRM W/O&W/DYE: CPT

## 2022-05-02 ENCOUNTER — EMERGENCY (EMERGENCY)
Facility: HOSPITAL | Age: 32
LOS: 1 days | Discharge: HOME | End: 2022-05-02
Attending: STUDENT IN AN ORGANIZED HEALTH CARE EDUCATION/TRAINING PROGRAM
Payer: COMMERCIAL

## 2022-05-02 VITALS
SYSTOLIC BLOOD PRESSURE: 114 MMHG | HEIGHT: 59 IN | TEMPERATURE: 98 F | RESPIRATION RATE: 20 BRPM | HEART RATE: 72 BPM | DIASTOLIC BLOOD PRESSURE: 76 MMHG | OXYGEN SATURATION: 100 %

## 2022-05-02 VITALS
DIASTOLIC BLOOD PRESSURE: 65 MMHG | OXYGEN SATURATION: 100 % | TEMPERATURE: 99 F | RESPIRATION RATE: 19 BRPM | HEART RATE: 90 BPM | SYSTOLIC BLOOD PRESSURE: 100 MMHG

## 2022-05-02 PROCEDURE — 73564 X-RAY EXAM KNEE 4 OR MORE: CPT | Mod: 26,RT

## 2022-05-02 PROCEDURE — 99284 EMERGENCY DEPT VISIT MOD MDM: CPT | Mod: 25

## 2022-05-02 PROCEDURE — 73502 X-RAY EXAM HIP UNI 2-3 VIEWS: CPT | Mod: 26,RT

## 2022-05-02 PROCEDURE — 93971 EXTREMITY STUDY: CPT | Mod: 26,RT

## 2022-05-02 PROCEDURE — 99284 EMERGENCY DEPT VISIT MOD MDM: CPT

## 2022-05-02 PROCEDURE — 93971 EXTREMITY STUDY: CPT

## 2022-05-02 PROCEDURE — 73564 X-RAY EXAM KNEE 4 OR MORE: CPT

## 2022-05-02 PROCEDURE — 73502 X-RAY EXAM HIP UNI 2-3 VIEWS: CPT

## 2022-05-02 RX ORDER — ACETAMINOPHEN 500 MG
650 TABLET ORAL ONCE
Refills: 0 | Status: COMPLETED | OUTPATIENT
Start: 2022-05-02 | End: 2022-05-02

## 2022-05-02 RX ADMIN — Medication 650 MILLIGRAM(S): at 13:00

## 2022-05-02 RX ADMIN — Medication 650 MILLIGRAM(S): at 11:25

## 2022-05-02 NOTE — ED ADULT NURSE NOTE - BEFAST EYES
Patient said that she is waiting for someone to call her for a hospital discharge appt    Was released from [facility]    Please call patient # [phone] No

## 2022-05-02 NOTE — ED PROVIDER NOTE - ATTENDING CONTRIBUTION TO CARE
I, Jesus Witt, performed a history and physical exam of the patient and discussed their management with the resident and/or advanced care provider. I reviewed the resident and/or advanced care provider's note and agree with the documented findings and plan of care. I was present and available for all procedures.    32yo f pmhx lupus antiphospholipid and prev rue dvt no longer on ac pw rle leg pain x3 days denies weakness. Denies recent trauma, fevers, chills, headache, dizziness, nausea, vomiting, dysuria, freq, hematuria, diarrhea, constipation, chest pain, shortness of breath, cough.     Well appearing and in NAD, head normal appearing atraumatic, trachea midline, no respiratory distress, lungs cta bilaterally, rrr no murmurs, soft NT ND abdomen, no visible extremity deformities, Alert and oriented, non focal neuro exam, skin warm and dry, normal affect and mood, strength sensation full equal and intact bilateral le's, FROM bilateral le's no swelling or calf ttp no rashes pelvis stable    concerning for atraumatic leg pain with clotting disorder will eval xray r/o fracture duplex pocus r/o dvt analgesia pmd Follow up

## 2022-05-02 NOTE — ED PROVIDER NOTE - PHYSICAL EXAMINATION
Const: Well-nourished, Well-developed, appearing stated age.  Eyes: no conjunctival injection, and symmetrical lids.  HEENT: Head NCAT, no lesions. Atraumatic external nose and ears. Moist MM.  Neck: Symmetric, trachea midline.   CVS: +S1/S2  RESP: Unlabored respiratory effort.   GI: Nontender/Nondistended  MSK: Normocephalic/Atraumatic, Lower Extremities w/o edema b/l, no erythema, no point tenderness   Skin: Warm, dry and intact.   Neuro: CNs II-XII grossly intact. Motor & Sensation grossly intact.  Psych: Awake, Alert, & Oriented (AAO) x3. Appropriate mood and affect.

## 2022-05-02 NOTE — ED ADULT TRIAGE NOTE - CHIEF COMPLAINT QUOTE
sesar been having pain weakness to both legs onset 3 days ago pt has hx lupus pt states tightness to both legs alos hx antiphosfolipid syndrome

## 2022-05-02 NOTE — ED PROVIDER NOTE - OBJECTIVE STATEMENT
32 yo F with hx of Lupus c/b RUE ulnar arterial clot (previously on Warfarin) p/w RLE pain that is 10/10, constant, starts at knee and radiatae down leg, no trauma started on Friday, with no leg swelling or redness. Pt is able to ambulate. No cp, no sob, n/v/f/c.

## 2022-05-02 NOTE — ED PROVIDER NOTE - PATIENT PORTAL LINK FT
You can access the FollowMyHealth Patient Portal offered by [facility] by registering at the following website: [email or web address]. By joining [employer]’s FollowMyHealth portal, you will also be able to view your health information using other applications (apps) compatible with our system.

## 2022-05-02 NOTE — ED PROVIDER NOTE - CHIEF COMPLAINT
The patient is a 31y Female complaining of  The patient is a 31y Female complaining of right legh pain.

## 2022-05-02 NOTE — ED PROVIDER NOTE - CLINICAL SUMMARY MEDICAL DECISION MAKING FREE TEXT BOX
32 yo F with hx of Lupus c/b RUE ulnar arterial clot (previously on Warfarin) p/w RLE pain that is 10/10, constant, starts at knee and radiatae down leg. WIll r/o DVT due to hx of antiphospholipid and clots. Pt in no distress with no SOB. DVT US.

## 2022-05-02 NOTE — ED ADULT NURSE NOTE - OBJECTIVE STATEMENT
Patient  is  alert  and  oriented x3.  Color is  good and skin warm to touch.  She  is  c/o  weakness and  pain to both legs.  No swelling noted.

## 2022-05-02 NOTE — ED PROVIDER NOTE - NS ED ROS FT
CONST: no fevers, no chills, no trauma  ENT: no sore throat, no epistaxis, no rhinorrhea  CV: no chest pain, no palpitations, no orthopnea, +extremity pain or swelling  RESP: no shortness of breath, no cough, no sputum  ABD: no abdominal pain, no nausea, no vomiting  : no dysuria no frequency, no urgency  MSK: no back pain, no neck pain, +extremity pain  NEURO: no headache, no sensory disturbances, no focal weakness, no dizziness  SKIN: no diaphoresis, no rash

## 2022-05-02 NOTE — ED PROVIDER NOTE - NSFOLLOWUPINSTRUCTIONS_ED_ALL_ED_FT
1. TAKE ALL MEDICATIONS AS DIRECTED.  Today an ultrasound of your leg was completed and did not show any evidence of proximal deep vein thrombosis, but we recommend repeat lower extremity ultrasound in 5-7 days. Please call your doctor to arrange and obtain a repeat outpatient ultrasound.  2. FOR PAIN OR FEVER YOU CAN TAKE IBUPROFEN (MOTRIN, ADVIL) OR ACETAMINOPHEN (TYLENOL) AS NEEDED, AS DIRECTED ON PACKAGING.  3. FOLLOW UP WITH YOUR PRIMARY DOCTOR WITHIN 5 DAYS AS DIRECTED.  4. IF YOU HAD LABS OR IMAGING DONE, YOU WERE GIVEN COPIES OF ALL LABS AND/OR IMAGING RESULTS FROM YOUR ER VISIT--PLEASE TAKE THEM WITH YOU TO YOUR FOLLOW UP APPOINTMENTS.  5. RETURN TO THE ER FOR ANY WORSENING SYMPTOMS OR CONCERNS.    MUSCULOSKELETAL PAIN     Muscle pain can be dull, achy, or sharp. You may have pain and tenderness to the touch as well. It can occur anywhere on your body and is often brought on by exercise. Muscle pain may occur from an injury, such as a sprain, tendonitis, or bone fracture. Muscle pain can also be the result of medical conditions, such as polymyositis, fibromyalgia, and connective tissue disorders.     DISCHARGE INSTRUCTIONS:    Self care:   •Rest as directed and avoid activity that causes pain. You may be able to return to normal activity when you can move without pain. Follow directions for rest and activity. You are at risk for injury for 3 weeks after your symptoms go away.     •Ice your painful muscle area to decrease pain and swelling. Use an ice pack, or put ice in a plastic bag and cover it with a towel. Always put a cloth between the ice and your skin. Apply the ice as often as directed for the first 24 to 48 hours.     •Compression with a splint, brace, or elastic bandage helps decrease pain and swelling. This may be needed for muscle pain in arms or legs. A splint, brace, or bandage will also help protect the painful area when you move around.     •Elevate a painful arm or leg to reduce swelling and pain. Elevate your limb while you are sitting or lying down. Prop a painful leg on pillows to keep it above the level of your heart.    Medicines:   •NSAIDs help decrease swelling and pain or fever. This medicine is available with or without a doctor's order. NSAIDs can cause stomach bleeding or kidney problems in certain people. If you take blood thinner medicine, always ask your healthcare provider if NSAIDs are safe for you. Always read the medicine label and follow directions.    •Acetaminophen is used to decrease pain. It is available without a doctor's order. Ask your healthcare provider how much to take and when to take it. Follow directions. Acetaminophen can cause liver damage if not taken correctly. Do not take more than one medicine that contains acetaminophen unless directed.     •Muscle relaxers help relax your muscles to decrease pain and muscle spasms.     •Steroids may be given to decrease redness, pain, and swelling.    •Take your medicine as directed. Contact your healthcare provider if you think your medicine is not helping or if you have side effects. Tell him if you are allergic to any medicine. Keep a list of the medicines, vitamins, and herbs you take. Include the amounts, and when and why you take them. Bring the list or the pill bottles to follow-up visits. Carry your medicine list with you in case of an emergency.    Follow up with your healthcare provider as directed: You may need more tests to help healthcare providers find the cause of your muscle pain. You may need physical therapy to learn muscle strengthening exercises. Write down your questions so you remember to ask them during your visits.     Contact your healthcare provider if:   •You have a fever.   •You have trouble sleeping because of your pain.   •Your painful area becomes more tender, red, and warm to the touch.   •You have decreased movement of the painful area.   •You have questions or concerns about your condition or care.    Return to the emergency department if:   •You have increased severe pain when you move the painful muscle area.   •You lose feeling in your painful muscle area.   •You have new or worse swelling in the painful area. Your skin may feel tight.

## 2022-05-10 ENCOUNTER — APPOINTMENT (OUTPATIENT)
Dept: RHEUMATOLOGY | Facility: CLINIC | Age: 32
End: 2022-05-10
Payer: COMMERCIAL

## 2022-05-10 VITALS
WEIGHT: 148 LBS | OXYGEN SATURATION: 98 % | DIASTOLIC BLOOD PRESSURE: 76 MMHG | SYSTOLIC BLOOD PRESSURE: 116 MMHG | HEART RATE: 67 BPM | RESPIRATION RATE: 14 BRPM | TEMPERATURE: 97.8 F | HEIGHT: 59 IN | BODY MASS INDEX: 29.84 KG/M2

## 2022-05-10 PROCEDURE — 99214 OFFICE O/P EST MOD 30 MIN: CPT

## 2022-05-13 ENCOUNTER — APPOINTMENT (OUTPATIENT)
Dept: NEUROLOGY | Facility: CLINIC | Age: 32
End: 2022-05-13

## 2022-06-10 ENCOUNTER — RX RENEWAL (OUTPATIENT)
Age: 32
End: 2022-06-10

## 2022-08-08 ENCOUNTER — NON-APPOINTMENT (OUTPATIENT)
Age: 32
End: 2022-08-08

## 2022-08-09 ENCOUNTER — APPOINTMENT (OUTPATIENT)
Dept: RHEUMATOLOGY | Facility: CLINIC | Age: 32
End: 2022-08-09

## 2022-08-09 VITALS
DIASTOLIC BLOOD PRESSURE: 72 MMHG | BODY MASS INDEX: 28.83 KG/M2 | HEIGHT: 59 IN | HEART RATE: 68 BPM | SYSTOLIC BLOOD PRESSURE: 114 MMHG | RESPIRATION RATE: 14 BRPM | TEMPERATURE: 97.2 F | WEIGHT: 143 LBS | OXYGEN SATURATION: 99 %

## 2022-08-09 PROCEDURE — 99215 OFFICE O/P EST HI 40 MIN: CPT

## 2022-08-14 ENCOUNTER — RX RENEWAL (OUTPATIENT)
Age: 32
End: 2022-08-14

## 2022-11-08 ENCOUNTER — APPOINTMENT (OUTPATIENT)
Dept: RHEUMATOLOGY | Facility: CLINIC | Age: 32
End: 2022-11-08

## 2022-11-08 ENCOUNTER — LABORATORY RESULT (OUTPATIENT)
Age: 32
End: 2022-11-08

## 2022-11-08 VITALS
BODY MASS INDEX: 30.44 KG/M2 | HEART RATE: 74 BPM | TEMPERATURE: 97.2 F | RESPIRATION RATE: 14 BRPM | OXYGEN SATURATION: 98 % | SYSTOLIC BLOOD PRESSURE: 119 MMHG | HEIGHT: 59 IN | DIASTOLIC BLOOD PRESSURE: 86 MMHG | WEIGHT: 151 LBS

## 2022-11-08 PROCEDURE — 99214 OFFICE O/P EST MOD 30 MIN: CPT

## 2022-11-08 RX ORDER — METOPROLOL TARTRATE 25 MG/1
25 TABLET, FILM COATED ORAL
Qty: 3 | Refills: 0 | Status: DISCONTINUED | COMMUNITY
Start: 2022-03-16 | End: 2022-11-08

## 2022-11-08 NOTE — ASSESSMENT
[FreeTextEntry1] : 28 year-old female with red rash over feet and hand with severe pain - appearance of vasculitis\par Labs with positive DAMON and DSDNA, triple positive APS\par \par \par 1. 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 headaches - twice a week - has not seen neuro\par 4. post covid 19 - January - recovered fully -\par 5. weakness - mild \par 6. chest pain - referral to cardiology - random - no pain today - maybe twice in 1 month\par 7. GI discomfort -  referral to gastroenterology\par \par 8 obesity - trial of phentermine 15 mg daily\par \par I reviewed previous labs results with patients.\par labs today\par Diagnosis and Prognosis discussed\par Continue with current medications\par medications refilled\par F/u 3 months\par

## 2022-11-08 NOTE — HISTORY OF PRESENT ILLNESS
[___ Month(s) Ago] : [unfilled] month(s) ago [FreeTextEntry1] : no flares since the last visit\par but achy\par now on aspirin daily consistently\par no other complains\par \par \par \par new onset of chest pain  feels tightness - and - random, not related to activity\par feels well with no other complains\par \par \par  [FreeTextEntry3] : 2018 [FreeTextEntry7] : DsDNA [FreeTextEntry4] : hydroxychloroquine - coumadin [FreeTextEntry6] : APS with right arm arterial occlusion [Skin Lesions] : no lesions [Muscle Weakness] : no muscle weakness [None] : The patient is currently asymptomatic

## 2022-11-10 LAB
ALBUMIN SERPL ELPH-MCNC: 4.8 G/DL
ALP BLD-CCNC: 64 U/L
ALT SERPL-CCNC: 11 U/L
ANION GAP SERPL CALC-SCNC: 11 MMOL/L
APPEARANCE: CLEAR
AST SERPL-CCNC: 15 U/L
BASOPHILS # BLD AUTO: 0.05 K/UL
BASOPHILS NFR BLD AUTO: 0.8 %
BILIRUB SERPL-MCNC: 0.4 MG/DL
BILIRUBIN URINE: NEGATIVE
BLOOD URINE: ABNORMAL
BUN SERPL-MCNC: 13 MG/DL
C3 SERPL-MCNC: 98 MG/DL
C4 SERPL-MCNC: 18 MG/DL
CALCIUM SERPL-MCNC: 9.2 MG/DL
CHLORIDE SERPL-SCNC: 104 MMOL/L
CO2 SERPL-SCNC: 26 MMOL/L
COLOR: NORMAL
CREAT SERPL-MCNC: 0.83 MG/DL
CREAT SPEC-SCNC: 124 MG/DL
CREAT/PROT UR: 0.1 RATIO
CRP SERPL-MCNC: <3 MG/L
DEPRECATED KAPPA LC FREE/LAMBDA SER: 0.63 RATIO
DSDNA AB SER-ACNC: 52 IU/ML
EGFR: 96 ML/MIN/1.73M2
EOSINOPHIL # BLD AUTO: 0.09 K/UL
EOSINOPHIL NFR BLD AUTO: 1.4 %
ERYTHROCYTE [SEDIMENTATION RATE] IN BLOOD BY WESTERGREN METHOD: 2 MM/HR
GLUCOSE QUALITATIVE U: NEGATIVE
GLUCOSE SERPL-MCNC: 93 MG/DL
HCT VFR BLD CALC: 41 %
HGB BLD-MCNC: 14.2 G/DL
IGA SER QL IEP: 235 MG/DL
IGG SER QL IEP: 1102 MG/DL
IGM SER QL IEP: 173 MG/DL
IMM GRANULOCYTES NFR BLD AUTO: 0.3 %
KAPPA LC CSF-MCNC: 1.96 MG/DL
KAPPA LC SERPL-MCNC: 1.24 MG/DL
KETONES URINE: NEGATIVE
LEUKOCYTE ESTERASE URINE: NEGATIVE
LYMPHOCYTES # BLD AUTO: 2.01 K/UL
LYMPHOCYTES NFR BLD AUTO: 30.3 %
MAN DIFF?: NORMAL
MCHC RBC-ENTMCNC: 30 PG
MCHC RBC-ENTMCNC: 34.6 GM/DL
MCV RBC AUTO: 86.5 FL
MONOCYTES # BLD AUTO: 0.57 K/UL
MONOCYTES NFR BLD AUTO: 8.6 %
NEUTROPHILS # BLD AUTO: 3.9 K/UL
NEUTROPHILS NFR BLD AUTO: 58.6 %
NITRITE URINE: NEGATIVE
PH URINE: 6.5
PLATELET # BLD AUTO: 348 K/UL
POTASSIUM SERPL-SCNC: 4.7 MMOL/L
PROT SERPL-MCNC: 7.2 G/DL
PROT UR-MCNC: 7 MG/DL
PROTEIN URINE: ABNORMAL
RBC # BLD: 4.74 M/UL
RBC # FLD: 12.3 %
SODIUM SERPL-SCNC: 140 MMOL/L
SPECIFIC GRAVITY URINE: 1.02
UROBILINOGEN URINE: NORMAL
WBC # FLD AUTO: 6.64 K/UL

## 2023-01-24 ENCOUNTER — APPOINTMENT (OUTPATIENT)
Dept: RHEUMATOLOGY | Facility: CLINIC | Age: 33
End: 2023-01-24
Payer: COMMERCIAL

## 2023-01-24 VITALS
OXYGEN SATURATION: 98 % | WEIGHT: 144.13 LBS | BODY MASS INDEX: 29.06 KG/M2 | SYSTOLIC BLOOD PRESSURE: 101 MMHG | RESPIRATION RATE: 16 BRPM | DIASTOLIC BLOOD PRESSURE: 68 MMHG | HEART RATE: 90 BPM | HEIGHT: 59 IN

## 2023-01-24 PROCEDURE — 99214 OFFICE O/P EST MOD 30 MIN: CPT

## 2023-01-24 NOTE — HISTORY OF PRESENT ILLNESS
[___ Month(s) Ago] : [unfilled] month(s) ago [None] : The patient is currently asymptomatic [FreeTextEntry1] : 1 episode of severe fatigue about 2-3 weeks ago\par also with muscle weakness and myalgias\par took about 1 week to recovered\par on  mg daily\par \par  [FreeTextEntry3] : 2018 [FreeTextEntry7] : DsDNA [FreeTextEntry4] : hydroxychloroquine - coumadin [FreeTextEntry6] : APS with right arm arterial occlusion [Skin Lesions] : no lesions [Muscle Weakness] : no muscle weakness

## 2023-01-24 NOTE — PHYSICAL EXAM
[General Appearance - Alert] : alert [General Appearance - In No Acute Distress] : in no acute distress [Outer Ear] : the ears and nose were normal in appearance [Oropharynx] : the oropharynx was normal [Neck Appearance] : the appearance of the neck was normal [Neck Cervical Mass (___cm)] : no neck mass was observed [Jugular Venous Distention Increased] : there was no jugular-venous distention [Thyroid Diffuse Enlargement] : the thyroid was not enlarged [Thyroid Nodule] : there were no palpable thyroid nodules [Auscultation Breath Sounds / Voice Sounds] : lungs were clear to auscultation bilaterally [Heart Rate And Rhythm] : heart rate was normal and rhythm regular [Heart Sounds] : normal S1 and S2 [Heart Sounds Gallop] : no gallops [Murmurs] : no murmurs [Heart Sounds Pericardial Friction Rub] : no pericardial rub [Bowel Sounds] : normal bowel sounds [Abdomen Soft] : soft [Abdomen Tenderness] : non-tender [Abdomen Mass (___ Cm)] : no abdominal mass palpated [Cervical Lymph Nodes Enlarged Posterior Bilaterally] : posterior cervical [Cervical Lymph Nodes Enlarged Anterior Bilaterally] : anterior cervical [Supraclavicular Lymph Nodes Enlarged Bilaterally] : supraclavicular [No CVA Tenderness] : no ~M costovertebral angle tenderness [No Spinal Tenderness] : no spinal tenderness [Abnormal Walk] : normal gait [Nail Clubbing] : no clubbing  or cyanosis of the fingernails [Musculoskeletal - Swelling] : no joint swelling seen [Motor Tone] : muscle strength and tone were normal [Skin Color & Pigmentation] : normal skin color and pigmentation [Skin Turgor] : normal skin turgor [] : no rash [Oriented To Time, Place, And Person] : oriented to person, place, and time [Affect] : the affect was normal [Impaired Insight] : insight and judgment were intact [FreeTextEntry1] : no rashes

## 2023-01-24 NOTE — ASSESSMENT
[FreeTextEntry1] : 28 year-old female with red rash over feet and hand with severe pain - appearance of vasculitis\par Labs with positive DAMON and DSDNA, triple positive APS\par \par \par 1. 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 headaches - twice a week - has not seen neuro\par 4. post covid 19 - January - recovered fully -\par 5. weakness -no weakness on exam today\par 6. chest pain - referral to cardiology - random - no pain today - maybe twice in 1 month\par 7. GI discomfort -  referral to gastroenterology\par \par getting  on 09/2023\par planning on pregnancy - has IUD in place until 08/2022\par \par I reviewed previous labs results with patients.\par labs today\par Diagnosis and Prognosis discussed\par Continue with current medications\par medications refilled\par F/u 3 months\par

## 2023-02-25 ENCOUNTER — NON-APPOINTMENT (OUTPATIENT)
Age: 33
End: 2023-02-25

## 2023-03-09 ENCOUNTER — RX RENEWAL (OUTPATIENT)
Age: 33
End: 2023-03-09

## 2023-04-19 ENCOUNTER — LABORATORY RESULT (OUTPATIENT)
Age: 33
End: 2023-04-19

## 2023-04-21 LAB
ALBUMIN SERPL ELPH-MCNC: 4.4 G/DL
ALDOLASE SERPL-CCNC: 3 U/L
ALP BLD-CCNC: 61 U/L
ALT SERPL-CCNC: 7 U/L
ANION GAP SERPL CALC-SCNC: 10 MMOL/L
APPEARANCE: CLEAR
AST SERPL-CCNC: 12 U/L
BASOPHILS # BLD AUTO: 0.05 K/UL
BASOPHILS NFR BLD AUTO: 0.5 %
BILIRUB SERPL-MCNC: 0.5 MG/DL
BILIRUBIN URINE: NEGATIVE
BLOOD URINE: ABNORMAL
BUN SERPL-MCNC: 9 MG/DL
C3 SERPL-MCNC: 85 MG/DL
C4 SERPL-MCNC: 20 MG/DL
CALCIUM SERPL-MCNC: 9.2 MG/DL
CHLORIDE SERPL-SCNC: 102 MMOL/L
CK SERPL-CCNC: 55 U/L
CO2 SERPL-SCNC: 26 MMOL/L
COLOR: YELLOW
CREAT SERPL-MCNC: 0.68 MG/DL
CREAT SPEC-SCNC: 142 MG/DL
CREAT/PROT UR: 0.1 RATIO
CRP SERPL-MCNC: 4 MG/L
DEPRECATED KAPPA LC FREE/LAMBDA SER: 0.71 RATIO
DSDNA AB SER-ACNC: 49 IU/ML
EGFR: 119 ML/MIN/1.73M2
ENA RNP AB SER IA-ACNC: <0.2 AL
ENA SCL70 IGG SER IA-ACNC: <0.2 AL
ENA SM AB SER IA-ACNC: <0.2 AL
ENA SS-A AB SER IA-ACNC: <0.2 AL
ENA SS-B AB SER IA-ACNC: <0.2 AL
EOSINOPHIL # BLD AUTO: 0.06 K/UL
EOSINOPHIL NFR BLD AUTO: 0.7 %
ERYTHROCYTE [SEDIMENTATION RATE] IN BLOOD BY WESTERGREN METHOD: 6 MM/HR
FOLATE SERPL-MCNC: 7.9 NG/ML
GLUCOSE QUALITATIVE U: NEGATIVE MG/DL
GLUCOSE SERPL-MCNC: 100 MG/DL
HCT VFR BLD CALC: 37.3 %
HGB BLD-MCNC: 12.5 G/DL
IGA SER QL IEP: 233 MG/DL
IGG SER QL IEP: 1055 MG/DL
IGM SER QL IEP: 140 MG/DL
IMM GRANULOCYTES NFR BLD AUTO: 0.4 %
IRON SATN MFR SERPL: 10 %
IRON SERPL-MCNC: 31 UG/DL
KAPPA LC CSF-MCNC: 2.05 MG/DL
KAPPA LC SERPL-MCNC: 1.45 MG/DL
KETONES URINE: NEGATIVE MG/DL
LEUKOCYTE ESTERASE URINE: NEGATIVE
LYMPHOCYTES # BLD AUTO: 1.56 K/UL
LYMPHOCYTES NFR BLD AUTO: 17 %
MAN DIFF?: NORMAL
MCHC RBC-ENTMCNC: 29.8 PG
MCHC RBC-ENTMCNC: 33.5 GM/DL
MCV RBC AUTO: 89 FL
MONOCYTES # BLD AUTO: 0.61 K/UL
MONOCYTES NFR BLD AUTO: 6.7 %
MYOGLOBIN SERPL-MCNC: <21 NG/ML
NEUTROPHILS # BLD AUTO: 6.83 K/UL
NEUTROPHILS NFR BLD AUTO: 74.7 %
NITRITE URINE: NEGATIVE
PH URINE: 5.5
PLATELET # BLD AUTO: 292 K/UL
POTASSIUM SERPL-SCNC: 4.3 MMOL/L
PROT SERPL-MCNC: 6.7 G/DL
PROT UR-MCNC: 10 MG/DL
PROTEIN URINE: 30 MG/DL
RBC # BLD: 4.19 M/UL
RBC # FLD: 12.4 %
SODIUM SERPL-SCNC: 139 MMOL/L
SPECIFIC GRAVITY URINE: 1.02
TIBC SERPL-MCNC: 309 UG/DL
TSH SERPL-ACNC: 2.13 UIU/ML
UIBC SERPL-MCNC: 279 UG/DL
UROBILINOGEN URINE: 0.2 MG/DL
VIT B12 SERPL-MCNC: 341 PG/ML
WBC # FLD AUTO: 9.15 K/UL

## 2023-04-25 ENCOUNTER — LABORATORY RESULT (OUTPATIENT)
Age: 33
End: 2023-04-25

## 2023-04-25 ENCOUNTER — APPOINTMENT (OUTPATIENT)
Dept: RHEUMATOLOGY | Facility: CLINIC | Age: 33
End: 2023-04-25
Payer: COMMERCIAL

## 2023-04-25 VITALS
WEIGHT: 131 LBS | TEMPERATURE: 98.1 F | SYSTOLIC BLOOD PRESSURE: 101 MMHG | OXYGEN SATURATION: 96 % | DIASTOLIC BLOOD PRESSURE: 65 MMHG | RESPIRATION RATE: 16 BRPM | HEART RATE: 85 BPM | BODY MASS INDEX: 26.41 KG/M2 | HEIGHT: 59 IN

## 2023-04-25 PROCEDURE — 99215 OFFICE O/P EST HI 40 MIN: CPT

## 2023-04-25 RX ORDER — PHENTERMINE HYDROCHLORIDE 30 MG/1
30 CAPSULE ORAL
Qty: 30 | Refills: 0 | Status: DISCONTINUED | COMMUNITY
Start: 2022-11-09 | End: 2023-04-25

## 2023-04-25 RX ORDER — PREDNISONE 20 MG/1
20 TABLET ORAL
Qty: 30 | Refills: 1 | Status: DISCONTINUED | COMMUNITY
Start: 2023-04-19 | End: 2023-04-25

## 2023-04-25 NOTE — ASSESSMENT
[FreeTextEntry1] : 28 year-old female with red rash over feet and hand with severe pain - appearance of vasculitis\par Labs with positive DAMON and DSDNA, triple positive APS\par \par \par 1. 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 headaches - twice a week - has not seen neuro\par 4. post covid 19 - January - recovered fully -\par 5. weakness -no weakness on exam today\par 6. chest pain - referral to cardiology - random - no pain today - maybe twice in 1 month\par 7. GI discomfort -  referral to gastroenterology\par 8. pregnant at 2 weeks and 5 days - will start lovenox at week 6 - ongoing bleeding - unsure if embryo is viable\par \par \par \par I reviewed previous labs results with patients.\par labs today\par Diagnosis and Prognosis discussed\par Continue with current medications\par medications refilled\par F/u 3  months

## 2023-04-25 NOTE — HISTORY OF PRESENT ILLNESS
[___ Month(s) Ago] : [unfilled] month(s) ago [None] : The patient is currently asymptomatic [FreeTextEntry1] : pregnant now maybe 4-5 weeks - but ongoing mild bleeding\par still on baby aspirin and hcq 400 mg daily\par labs labs with low titer DsDNA\par  [FreeTextEntry3] : 2018 [FreeTextEntry7] : DsDNA [FreeTextEntry4] : hydroxychloroquine - coumadin [FreeTextEntry6] : APS with right arm arterial occlusion [Skin Lesions] : no lesions [Muscle Weakness] : no muscle weakness

## 2023-04-25 NOTE — PHYSICAL EXAM
[General Appearance - Alert] : alert [General Appearance - In No Acute Distress] : in no acute distress [Outer Ear] : the ears and nose were normal in appearance [Oropharynx] : the oropharynx was normal [Neck Appearance] : the appearance of the neck was normal [Neck Cervical Mass (___cm)] : no neck mass was observed [Jugular Venous Distention Increased] : there was no jugular-venous distention [Thyroid Nodule] : there were no palpable thyroid nodules [Thyroid Diffuse Enlargement] : the thyroid was not enlarged [Auscultation Breath Sounds / Voice Sounds] : lungs were clear to auscultation bilaterally [Heart Rate And Rhythm] : heart rate was normal and rhythm regular [Heart Sounds] : normal S1 and S2 [Heart Sounds Gallop] : no gallops [Murmurs] : no murmurs [Heart Sounds Pericardial Friction Rub] : no pericardial rub [Bowel Sounds] : normal bowel sounds [Abdomen Soft] : soft [Abdomen Tenderness] : non-tender [Abdomen Mass (___ Cm)] : no abdominal mass palpated [Cervical Lymph Nodes Enlarged Posterior Bilaterally] : posterior cervical [Cervical Lymph Nodes Enlarged Anterior Bilaterally] : anterior cervical [Supraclavicular Lymph Nodes Enlarged Bilaterally] : supraclavicular [No CVA Tenderness] : no ~M costovertebral angle tenderness [No Spinal Tenderness] : no spinal tenderness [Abnormal Walk] : normal gait [Nail Clubbing] : no clubbing  or cyanosis of the fingernails [Musculoskeletal - Swelling] : no joint swelling seen [Motor Tone] : muscle strength and tone were normal [Skin Color & Pigmentation] : normal skin color and pigmentation [Skin Turgor] : normal skin turgor [] : no rash [Oriented To Time, Place, And Person] : oriented to person, place, and time [Impaired Insight] : insight and judgment were intact [Affect] : the affect was normal [FreeTextEntry1] : no rashes

## 2023-05-01 ENCOUNTER — NON-APPOINTMENT (OUTPATIENT)
Age: 33
End: 2023-05-01

## 2023-05-02 ENCOUNTER — TRANSCRIPTION ENCOUNTER (OUTPATIENT)
Age: 33
End: 2023-05-02

## 2023-06-19 ENCOUNTER — APPOINTMENT (OUTPATIENT)
Dept: MATERNAL FETAL MEDICINE | Facility: CLINIC | Age: 33
End: 2023-06-19
Payer: COMMERCIAL

## 2023-06-19 ENCOUNTER — APPOINTMENT (OUTPATIENT)
Dept: ANTEPARTUM | Facility: CLINIC | Age: 33
End: 2023-06-19

## 2023-06-19 ENCOUNTER — NON-APPOINTMENT (OUTPATIENT)
Age: 33
End: 2023-06-19

## 2023-06-19 VITALS
DIASTOLIC BLOOD PRESSURE: 58 MMHG | HEIGHT: 59 IN | BODY MASS INDEX: 27.21 KG/M2 | RESPIRATION RATE: 16 BRPM | SYSTOLIC BLOOD PRESSURE: 92 MMHG | OXYGEN SATURATION: 98 % | HEART RATE: 74 BPM | WEIGHT: 135 LBS

## 2023-06-19 DIAGNOSIS — I82.621 ACUTE EMBOLISM AND THROMBOSIS OF DEEP VEINS OF RIGHT UPPER EXTREMITY: ICD-10-CM

## 2023-06-19 PROCEDURE — 99205 OFFICE O/P NEW HI 60 MIN: CPT

## 2023-06-19 RX ORDER — PRENATAL VIT NO.130/IRON/FOLIC 27MG-0.8MG
27-0.8 TABLET ORAL
Refills: 0 | Status: ACTIVE | COMMUNITY

## 2023-06-19 NOTE — RISK ASSESSMENT
[DVT or Other Clotting Disorders] : DVT or Other Clotting Disorders [Lupus] : Lupus [ Delivery] : OB history of  Delivery [] :  [(0) As much as I always could] : (0) No, not at all [(0) No, not at all] : (0) No, not at all

## 2023-06-19 NOTE — VITALS
[LMP (date): ___] : LMP was on [unfilled] [GA =___ Weeks] : which calculates to a GA of [unfilled] weeks [GA= ___ Days] : and [unfilled] day(s) [MATT by LMP (date): ___] : The calculated MATT by LMP is [unfilled]

## 2023-06-19 NOTE — ACTIVE PROBLEMS
[Diabetes Mellitus] : no diabetes mellitus [Hypertension] : no hypertension [Heart Disease] : no heart disease [Renal Disease] : no kidney disease, no UTI [Neurologic Disorder] : no neurologic disorder, no epilepsy [Psychiatric Disorders] : no psychiatric disorders [Depression] : no depression, no post partum depression [Hepatic Disorder] : no hepatitis, no liver disease [Thyroid Disorder] : no thyroid dysfunction [Trauma] : no trauma/violence [Blood Transfusion (___ Ml)] : no history of blood transfusion

## 2023-06-19 NOTE — FAMILY HISTORY
[Age 35+ During Pregnancy] : not 35 or over during pregnancy [Reported Family History Of Birth Defects] : no congenital heart defects [Bryan-Sachs Carrier] : no Bryan-Sachs [Family History] : no mental retardation/autism [Reported Family History Of Genetic Disease] : no history of child defect in child of baby father

## 2023-06-20 NOTE — OB HISTORY
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  She was diagnosed with lupus in 2018.  She is currently taking plaquenil 200 mg BID and aspirin 81 mg daily.  She was also recently prescribed lovenox 60 mg BID.  She has a history of arterial occlusion of the right ulnar artery.  Pt states that she is feeling well with no complaints. [Spotting Between  Menses] : no spotting between menses

## 2023-06-20 NOTE — DISCUSSION/SUMMARY
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  \par \par 1) Systemic Lupus Erythematosus (SLE) - She was diagnosed with SLE in 2018 and reports she has not had a flare since 2018. Pregnancy in a woman with SLE is associated with an increased risk of adverse maternal and fetal outcomes including  labor, preeclampsia, fetal growth restriction, and complete heart block especially in the setting of positive SSA/SSB antibodies.  She was informed that lupus disease activity may flare during pregnancy or the immediate postpartum period. Flares of lupus during pregnancy are generally attributed to the progressive increase in serum estrogen levels during pregnancy, especially in the third trimester.  She is currently taking Plaquenil 200 mg BID.  She has planned follow-up with her rheumatologist at [facility] (Dianne Álvarez NP).   \par \par 2)  Antiphospholipid Antibody Syndrome -  Antiphospholipid Antibody Syndrome requires the presence of at least one clinical and one laboratory criteria.  Clinical criteria include one or more unexplained deaths or a morphologically normal fetus at or beyond the 10th week of gestation, one or more premature births before 34 weeks gestation due to eclampsia/severe pre-eclampsia/or features consistent with placental insufficiency, or three or more unexplained consecutive spontaneous abortions before 10 weeks gestation with maternal anatomic and chromosomal causes excluded.  Abnormal laboratory tests must occur on at least 2 occasions > 12 weeks apart and must occur within a 5 year time frame.  Abnormal laboratory tests include lupus anticoagulant presence, anticardiolipin antibody IgG or IgM present at >40 GPL, or MPL or >99th percentile, or Anti-B2 glycoprotein-I IgG or IgM >99th percentile.  APS is associated with multiple complications including venous thromboembolism, early onset pre-eclampsia, early pregnancy loss, fetal growth restriction, fetal death, and  birth.  Therapy for APS with >3 unexplained consecutive pregnancy losses at < 10 weeks gestation or > 1 fetal loss > 10 weeks includes low dose aspirin and prophylactic heparin (either unfractionated or LMWH).  For APS with VTE during the current pregnancy treatment includes therapeutic anticoagulation with heparin. For APS with VTE prior to pregnancy treatment includes prophylactic anticoagulation with heparin.  Maternal complications of APS include venous and arterial thromboembolism (65-70% are venous), pre-eclampsia (18%-48%), autoimmune thrombocytopenia, and rarely catastrophic antiphospholipid syndrome.  Fetal complications of APS include pregnancy loss and fetal death, fetal growth restriction,  birth, and placental abruption.  Fetal surveillance should be initiated at 32 weeks gestation. Estrogen containing contraceptives are contraindicated.  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 This does was recommend by her Rheumatologist and should be continued.\par \par 4) History of  Delivery - One of the strongest clinical risk factors for  birth is a prior  birth.  She has a history of a 32 week gestation  delivery followed by a full term delivery. For this pregnancy, cervical surveillance with transvaginal ultrasound is recommended. In addition, a short cervical length (less than 25 mm) before 24 weeks of gestation is an indication for cervical cerclage placement. Cervical cerclage in this population is associated with significant decreases in  birth outcomes.  She is in agreement with cervical length surveillance. \par \par 5) Possible Maternal Ventricular Septal Defect - She had a CT angiogram on 22 due to atypical chest pain.  There was a tiny muscular ventricular septal defect at the level of the mid cavity seen at that time.  For this reason we are recommending both a maternal Cardio OB consultation and a fetal echocardiogram.\par \par Recommendations\par 1) Cervical length screening from 16-24 weeks gestation for history of  delivery\par 2) Level 2 ultrasound at 18-20 weeks gestation\par 3) Fetal echocardiogram secondary to maternal history of suspected VSD\par 4) Weekly fetal testing starting at 32 weeks gestation\par 5) Serial growth sonograms\par 6) Cardio OB consultation/maternal echocardiogram recommended\par 7) Continue aspirin and lovenox\par 8) Continue plaquenil and rheumatology follow-up\par 9) Timed delivery should be considered to time anticoagulation discontinuation\par 10) Avoid estrogen containing contraception\par \par At the end of our discussion the patient indicated that her questions were answered and she seemed satisfied with our discussion. Please do not hesitate to contact us with any questions. \par \par Sincerely, \par \par Jim Nam MD   \par Maternal-Fetal Medicine Fellow\par \par Enrico Alvarenga MD \par Maternal-Fetal Medicine Attending\par \par

## 2023-06-20 NOTE — CHIEF COMPLAINT
[G ___] : G [unfilled] [P ___] : P [unfilled] [de-identified] : Systemic Lupus Erythematosus, Antiphospholipid Antibody Syndrome, Possible Maternal Ventricular Septal Defect, History of Arterial Occlusion of Right Ulnar Artery, History of  Delivery

## 2023-06-21 ENCOUNTER — APPOINTMENT (OUTPATIENT)
Dept: ANTEPARTUM | Facility: CLINIC | Age: 33
End: 2023-06-21

## 2023-06-21 ENCOUNTER — APPOINTMENT (OUTPATIENT)
Dept: MATERNAL FETAL MEDICINE | Facility: CLINIC | Age: 33
End: 2023-06-21

## 2023-06-27 ENCOUNTER — APPOINTMENT (OUTPATIENT)
Dept: RHEUMATOLOGY | Facility: CLINIC | Age: 33
End: 2023-06-27
Payer: COMMERCIAL

## 2023-06-27 ENCOUNTER — LABORATORY RESULT (OUTPATIENT)
Age: 33
End: 2023-06-27

## 2023-06-27 VITALS
DIASTOLIC BLOOD PRESSURE: 75 MMHG | HEART RATE: 79 BPM | SYSTOLIC BLOOD PRESSURE: 110 MMHG | HEIGHT: 59 IN | BODY MASS INDEX: 27.21 KG/M2 | WEIGHT: 135 LBS

## 2023-06-27 PROCEDURE — 99215 OFFICE O/P EST HI 40 MIN: CPT

## 2023-06-27 NOTE — ASSESSMENT
[FreeTextEntry1] : 28 year-old female with red rash over feet and hand with severe pain - appearance of vasculitis\par Labs with positive DAMON and DSDNA, triple positive APS\par \par \par 1. SLE - on Plaquenil 200 mg BID - no side effects -no rashes or active symptoms - labs are stable with low titer DsDNA and normal complements continue with Plaquenil 400 mg daily - no active symptoms - continue with current regimen - stable -  lupus labs are low level - now pregnant at 8 weeks\par 2. 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 headaches - twice a week - has not seen neuro\par 4. post covid 19 - January - recovered fully -\par 5. weakness -no weakness on exam today\par 6. chest pain - referral to cardiology - random - no pain today - maybe twice in 1 month\par 7. GI discomfort -  referral to gastroenterology\par 8. pregnant at 8 weeks -  on lovenox therapeutic dose and baby aspirin - pending ob tomorrow and also MFM\par \par \par \par I reviewed previous labs results with patients.\par labs today\par Diagnosis and Prognosis discussed\par Continue with current medications\par medications refilled\par F/u 3  months

## 2023-06-27 NOTE — HISTORY OF PRESENT ILLNESS
[___ Month(s) Ago] : [unfilled] month(s) ago [None] : The patient is currently asymptomatic [FreeTextEntry1] : pregnant now maybe 8 weeks pregnant\par LMP 05/04/2023\par now on lovenox therapeutic dose -\par pending OB appt tomorrow [FreeTextEntry3] : 2018 [FreeTextEntry7] : DsDNA [FreeTextEntry4] : hydroxychloroquine - coumadin [FreeTextEntry6] : APS with right arm arterial occlusion [Skin Lesions] : no lesions [Muscle Weakness] : no muscle weakness

## 2023-06-27 NOTE — PHYSICAL EXAM
[General Appearance - Alert] : alert [General Appearance - In No Acute Distress] : in no acute distress [Oropharynx] : the oropharynx was normal [Outer Ear] : the ears and nose were normal in appearance [Neck Appearance] : the appearance of the neck was normal [Neck Cervical Mass (___cm)] : no neck mass was observed [Jugular Venous Distention Increased] : there was no jugular-venous distention [Thyroid Diffuse Enlargement] : the thyroid was not enlarged [Thyroid Nodule] : there were no palpable thyroid nodules [Auscultation Breath Sounds / Voice Sounds] : lungs were clear to auscultation bilaterally [Heart Rate And Rhythm] : heart rate was normal and rhythm regular [Heart Sounds] : normal S1 and S2 [Heart Sounds Gallop] : no gallops [Murmurs] : no murmurs [Heart Sounds Pericardial Friction Rub] : no pericardial rub [Bowel Sounds] : normal bowel sounds [Abdomen Soft] : soft [Abdomen Tenderness] : non-tender [Abdomen Mass (___ Cm)] : no abdominal mass palpated [Cervical Lymph Nodes Enlarged Posterior Bilaterally] : posterior cervical [Cervical Lymph Nodes Enlarged Anterior Bilaterally] : anterior cervical [Supraclavicular Lymph Nodes Enlarged Bilaterally] : supraclavicular [No CVA Tenderness] : no ~M costovertebral angle tenderness [No Spinal Tenderness] : no spinal tenderness [Abnormal Walk] : normal gait [Nail Clubbing] : no clubbing  or cyanosis of the fingernails [Musculoskeletal - Swelling] : no joint swelling seen [Motor Tone] : muscle strength and tone were normal [Skin Color & Pigmentation] : normal skin color and pigmentation [Skin Turgor] : normal skin turgor [] : no rash [Oriented To Time, Place, And Person] : oriented to person, place, and time [Impaired Insight] : insight and judgment were intact [Affect] : the affect was normal [FreeTextEntry1] : no rashes

## 2023-06-28 ENCOUNTER — APPOINTMENT (OUTPATIENT)
Dept: OBGYN | Facility: CLINIC | Age: 33
End: 2023-06-28
Payer: COMMERCIAL

## 2023-06-28 VITALS
SYSTOLIC BLOOD PRESSURE: 98 MMHG | WEIGHT: 135 LBS | BODY MASS INDEX: 27.21 KG/M2 | DIASTOLIC BLOOD PRESSURE: 60 MMHG | HEIGHT: 59 IN

## 2023-06-28 LAB — HCG UR QL: POSITIVE

## 2023-06-28 PROCEDURE — 81025 URINE PREGNANCY TEST: CPT

## 2023-06-28 PROCEDURE — 76830 TRANSVAGINAL US NON-OB: CPT

## 2023-06-28 PROCEDURE — 99203 OFFICE O/P NEW LOW 30 MIN: CPT | Mod: 25

## 2023-06-28 NOTE — HISTORY OF PRESENT ILLNESS
[FreeTextEntry1] : Patient is a 32-year-old female who presents with complaints of amenorrhea and a positive pregnancy test.  Patient's last menstrual period is May 4, 2023 which puts her at 7 weeks 5 days gestation with an estimated date of confinement of February 9, 2024.  Patient has a significant history of lupus patient is on Plaquenil Lovenox and baby aspirin.  Patient has seen [facility] [place].  And is under the care of rheumatology.

## 2023-06-28 NOTE — PROCEDURE
[Transvaginal OB Sonogram] : Transvaginal OB Sonogram [Intrauterine Pregnancy] : intrauterine pregnancy [Yolk Sac] : yolk sac present [Fetal Heart] : fetal heart present [CRL: ___ (mm)] : CRL - [unfilled]Umm [Date: ___] : Date: [unfilled] [Current GA by Sonogram: ___ (wks)] : Current GA by Sonogram: [unfilled]Uwks [___ day(s)] : [unfilled] days [FreeTextEntry1] : Transvaginal OB ultrasound performed: A viable lemus intrauterine gestation is seen, fetal heart motion is seen, crown-rump length is consistent with 7 weeks 6 days gestation which correlates with the patient's dates

## 2023-06-30 LAB
ALBUMIN SERPL ELPH-MCNC: 4.4 G/DL
ALP BLD-CCNC: 46 U/L
ALT SERPL-CCNC: 42 U/L
ANION GAP SERPL CALC-SCNC: 11 MMOL/L
APPEARANCE: CLEAR
AST SERPL-CCNC: 35 U/L
BILIRUB SERPL-MCNC: 0.2 MG/DL
BILIRUBIN URINE: NEGATIVE
BLOOD URINE: ABNORMAL
BUN SERPL-MCNC: 9 MG/DL
C3 SERPL-MCNC: 96 MG/DL
C4 SERPL-MCNC: 20 MG/DL
CALCIUM SERPL-MCNC: 9.3 MG/DL
CHLORIDE SERPL-SCNC: 103 MMOL/L
CO2 SERPL-SCNC: 23 MMOL/L
COLOR: YELLOW
CREAT SERPL-MCNC: 0.66 MG/DL
CREAT SPEC-SCNC: 78 MG/DL
CREAT/PROT UR: 0.1 RATIO
CRP SERPL-MCNC: <3 MG/L
DSDNA AB SER-ACNC: 60 IU/ML
EGFR: 119 ML/MIN/1.73M2
ENA RNP AB SER IA-ACNC: <0.2 AL
ENA SCL70 IGG SER IA-ACNC: <0.2 AL
ENA SM AB SER IA-ACNC: <0.2 AL
ENA SS-A AB SER IA-ACNC: <0.2 AL
ENA SS-B AB SER IA-ACNC: <0.2 AL
ERYTHROCYTE [SEDIMENTATION RATE] IN BLOOD BY WESTERGREN METHOD: 2 MM/HR
GLUCOSE QUALITATIVE U: NEGATIVE MG/DL
GLUCOSE SERPL-MCNC: 82 MG/DL
KETONES URINE: NEGATIVE MG/DL
LEUKOCYTE ESTERASE URINE: ABNORMAL
NITRITE URINE: NEGATIVE
PH URINE: 6
POTASSIUM SERPL-SCNC: 4.3 MMOL/L
PROT SERPL-MCNC: 6.9 G/DL
PROT UR-MCNC: 11 MG/DL
PROTEIN URINE: 30 MG/DL
SODIUM SERPL-SCNC: 137 MMOL/L
SPECIFIC GRAVITY URINE: 1.02
UROBILINOGEN URINE: 0.2 MG/DL

## 2023-07-13 ENCOUNTER — NON-APPOINTMENT (OUTPATIENT)
Age: 33
End: 2023-07-13

## 2023-07-17 ENCOUNTER — LABORATORY RESULT (OUTPATIENT)
Age: 33
End: 2023-07-17

## 2023-07-17 ENCOUNTER — APPOINTMENT (OUTPATIENT)
Dept: OBGYN | Facility: CLINIC | Age: 33
End: 2023-07-17
Payer: COMMERCIAL

## 2023-07-17 ENCOUNTER — NON-APPOINTMENT (OUTPATIENT)
Age: 33
End: 2023-07-17

## 2023-07-17 VITALS
WEIGHT: 135 LBS | SYSTOLIC BLOOD PRESSURE: 100 MMHG | HEIGHT: 59 IN | DIASTOLIC BLOOD PRESSURE: 60 MMHG | BODY MASS INDEX: 27.21 KG/M2

## 2023-07-17 PROCEDURE — 76830 TRANSVAGINAL US NON-OB: CPT

## 2023-07-17 PROCEDURE — 99215 OFFICE O/P EST HI 40 MIN: CPT | Mod: 25

## 2023-07-17 NOTE — PROCEDURE
[Intrauterine Pregnancy] : intrauterine pregnancy [Yolk Sac] : yolk sac present [Fetal Heart] : fetal heart present [CRL: ___ (mm)] : CRL - [unfilled]Umm [Date: ___] : EDC: [unfilled] [Current GA by Sonogram: ___ (wks)] : Current GA by Sonogram: [unfilled]Uwks [___ day(s)] : [unfilled] days [WNL] : Transvaginal OB Sonogram WNL [FreeTextEntry1] : Transvaginal OB ultrasound performed: A viable lemus intrauterine gestation is seen, fetal heart motion is noted, fetal movement is seen, crown-rump length is consistent with 10 weeks 5 days gestation.  This correlates with the patient's dates

## 2023-07-18 ENCOUNTER — EMERGENCY (EMERGENCY)
Facility: HOSPITAL | Age: 33
LOS: 0 days | Discharge: HOME | End: 2023-07-18
Attending: EMERGENCY MEDICINE
Payer: COMMERCIAL

## 2023-07-18 VITALS
HEART RATE: 96 BPM | HEIGHT: 59 IN | RESPIRATION RATE: 18 BRPM | OXYGEN SATURATION: 100 % | DIASTOLIC BLOOD PRESSURE: 71 MMHG | TEMPERATURE: 99 F | WEIGHT: 134.92 LBS | SYSTOLIC BLOOD PRESSURE: 104 MMHG

## 2023-07-18 VITALS
OXYGEN SATURATION: 97 % | HEART RATE: 84 BPM | RESPIRATION RATE: 18 BRPM | SYSTOLIC BLOOD PRESSURE: 92 MMHG | DIASTOLIC BLOOD PRESSURE: 77 MMHG

## 2023-07-18 DIAGNOSIS — O20.0 THREATENED ABORTION: ICD-10-CM

## 2023-07-18 DIAGNOSIS — O99.891 OTHER SPECIFIED DISEASES AND CONDITIONS COMPLICATING PREGNANCY: ICD-10-CM

## 2023-07-18 DIAGNOSIS — Z79.82 LONG TERM (CURRENT) USE OF ASPIRIN: ICD-10-CM

## 2023-07-18 DIAGNOSIS — Z3A.11 11 WEEKS GESTATION OF PREGNANCY: ICD-10-CM

## 2023-07-18 DIAGNOSIS — O26.891 OTHER SPECIFIED PREGNANCY RELATED CONDITIONS, FIRST TRIMESTER: ICD-10-CM

## 2023-07-18 DIAGNOSIS — Z79.01 LONG TERM (CURRENT) USE OF ANTICOAGULANTS: ICD-10-CM

## 2023-07-18 DIAGNOSIS — M32.9 SYSTEMIC LUPUS ERYTHEMATOSUS, UNSPECIFIED: ICD-10-CM

## 2023-07-18 LAB
ABO + RH PNL BLD: NORMAL
ALBUMIN SERPL ELPH-MCNC: 3.7 G/DL — SIGNIFICANT CHANGE UP (ref 3.3–5)
ALP SERPL-CCNC: 44 U/L — SIGNIFICANT CHANGE UP (ref 40–120)
ALT FLD-CCNC: 33 U/L — SIGNIFICANT CHANGE UP (ref 12–78)
ANION GAP SERPL CALC-SCNC: 4 MMOL/L — LOW (ref 5–17)
APPEARANCE UR: CLEAR — SIGNIFICANT CHANGE UP
APPEARANCE: CLEAR
APTT BLD: 44.5 SEC — HIGH (ref 27.5–35.5)
AST SERPL-CCNC: 13 U/L — LOW (ref 15–37)
BACTERIA # UR AUTO: ABNORMAL
BACTERIA: NEGATIVE /HPF
BASOPHILS # BLD AUTO: 0.04 K/UL — SIGNIFICANT CHANGE UP (ref 0–0.2)
BASOPHILS # BLD AUTO: 0.04 K/UL — SIGNIFICANT CHANGE UP (ref 0–0.2)
BASOPHILS NFR BLD AUTO: 0.5 % — SIGNIFICANT CHANGE UP (ref 0–2)
BASOPHILS NFR BLD AUTO: 0.5 % — SIGNIFICANT CHANGE UP (ref 0–2)
BILIRUB SERPL-MCNC: 0.3 MG/DL — SIGNIFICANT CHANGE UP (ref 0.2–1.2)
BILIRUB UR-MCNC: NEGATIVE — SIGNIFICANT CHANGE UP
BILIRUBIN URINE: NEGATIVE
BLD GP AB SCN SERPL QL: NORMAL
BLD GP AB SCN SERPL QL: SIGNIFICANT CHANGE UP
BLOOD URINE: NEGATIVE
BUN SERPL-MCNC: 8 MG/DL — SIGNIFICANT CHANGE UP (ref 7–23)
C TRACH RRNA SPEC QL NAA+PROBE: NOT DETECTED
CALCIUM SERPL-MCNC: 9.1 MG/DL — SIGNIFICANT CHANGE UP (ref 8.5–10.1)
CAST: 0 /LPF
CHLORIDE SERPL-SCNC: 107 MMOL/L — SIGNIFICANT CHANGE UP (ref 96–108)
CO2 SERPL-SCNC: 25 MMOL/L — SIGNIFICANT CHANGE UP (ref 22–31)
COLOR SPEC: YELLOW — SIGNIFICANT CHANGE UP
COLOR: YELLOW
CREAT SERPL-MCNC: 0.8 MG/DL — SIGNIFICANT CHANGE UP (ref 0.5–1.3)
DIFF PNL FLD: ABNORMAL
EGFR: 100 ML/MIN/1.73M2 — SIGNIFICANT CHANGE UP
EOSINOPHIL # BLD AUTO: 0.04 K/UL — SIGNIFICANT CHANGE UP (ref 0–0.5)
EOSINOPHIL # BLD AUTO: 0.06 K/UL — SIGNIFICANT CHANGE UP (ref 0–0.5)
EOSINOPHIL NFR BLD AUTO: 0.5 % — SIGNIFICANT CHANGE UP (ref 0–6)
EOSINOPHIL NFR BLD AUTO: 0.7 % — SIGNIFICANT CHANGE UP (ref 0–6)
EPI CELLS # UR: SIGNIFICANT CHANGE UP
EPITHELIAL CELLS: 1 /HPF
GLUCOSE QUALITATIVE U: NEGATIVE MG/DL
GLUCOSE SERPL-MCNC: 98 MG/DL — SIGNIFICANT CHANGE UP (ref 70–99)
GLUCOSE UR QL: NEGATIVE — SIGNIFICANT CHANGE UP
HBV SURFACE AG SER QL: NONREACTIVE
HCG SERPL-ACNC: HIGH MIU/ML
HCT VFR BLD CALC: 33 % — LOW (ref 34.5–45)
HCT VFR BLD CALC: 34.2 % — LOW (ref 34.5–45)
HCV AB SER QL: NONREACTIVE
HCV S/CO RATIO: 0.08 S/CO
HGB A MFR BLD: 97 %
HGB A2 MFR BLD: 3 %
HGB BLD-MCNC: 12.2 G/DL — SIGNIFICANT CHANGE UP (ref 11.5–15.5)
HGB BLD-MCNC: SIGNIFICANT CHANGE UP G/DL (ref 11.5–15.5)
HGB FRACT BLD-IMP: NORMAL
HIV1+2 AB SPEC QL IA.RAPID: REACTIVE
HSV1-DNA: NOT DETECTED
HSV2-DNA: NOT DETECTED
IMM GRANULOCYTES NFR BLD AUTO: 0.2 % — SIGNIFICANT CHANGE UP (ref 0–0.9)
IMM GRANULOCYTES NFR BLD AUTO: 0.4 % — SIGNIFICANT CHANGE UP (ref 0–0.9)
INR BLD: 0.99 RATIO — SIGNIFICANT CHANGE UP (ref 0.88–1.16)
KETONES UR-MCNC: NEGATIVE — SIGNIFICANT CHANGE UP
KETONES URINE: NEGATIVE MG/DL
LEUKOCYTE ESTERASE UR-ACNC: ABNORMAL
LEUKOCYTE ESTERASE URINE: NEGATIVE
LYMPHOCYTES # BLD AUTO: 2.14 K/UL — SIGNIFICANT CHANGE UP (ref 1–3.3)
LYMPHOCYTES # BLD AUTO: 2.37 K/UL — SIGNIFICANT CHANGE UP (ref 1–3.3)
LYMPHOCYTES # BLD AUTO: 25.6 % — SIGNIFICANT CHANGE UP (ref 13–44)
LYMPHOCYTES # BLD AUTO: 27.2 % — SIGNIFICANT CHANGE UP (ref 13–44)
MCHC RBC-ENTMCNC: 30.8 PG — SIGNIFICANT CHANGE UP (ref 27–34)
MCHC RBC-ENTMCNC: 37 GM/DL — HIGH (ref 32–36)
MCHC RBC-ENTMCNC: SIGNIFICANT CHANGE UP GM/DL (ref 32–36)
MCHC RBC-ENTMCNC: SIGNIFICANT CHANGE UP PG (ref 27–34)
MCV RBC AUTO: 80.3 FL — SIGNIFICANT CHANGE UP (ref 80–100)
MCV RBC AUTO: 83.3 FL — SIGNIFICANT CHANGE UP (ref 80–100)
MICROSCOPIC-UA: NORMAL
MONOCYTES # BLD AUTO: 0.58 K/UL — SIGNIFICANT CHANGE UP (ref 0–0.9)
MONOCYTES # BLD AUTO: 0.73 K/UL — SIGNIFICANT CHANGE UP (ref 0–0.9)
MONOCYTES NFR BLD AUTO: 6.9 % — SIGNIFICANT CHANGE UP (ref 2–14)
MONOCYTES NFR BLD AUTO: 8.4 % — SIGNIFICANT CHANGE UP (ref 2–14)
N GONORRHOEA RRNA SPEC QL NAA+PROBE: NOT DETECTED
NEUTROPHILS # BLD AUTO: 5.48 K/UL — SIGNIFICANT CHANGE UP (ref 1.8–7.4)
NEUTROPHILS # BLD AUTO: 5.53 K/UL — SIGNIFICANT CHANGE UP (ref 1.8–7.4)
NEUTROPHILS NFR BLD AUTO: 63 % — SIGNIFICANT CHANGE UP (ref 43–77)
NEUTROPHILS NFR BLD AUTO: 66.1 % — SIGNIFICANT CHANGE UP (ref 43–77)
NITRITE UR-MCNC: NEGATIVE — SIGNIFICANT CHANGE UP
NITRITE URINE: NEGATIVE
PH UR: 6 — SIGNIFICANT CHANGE UP (ref 5–8)
PH URINE: 6
PLATELET # BLD AUTO: 281 K/UL — SIGNIFICANT CHANGE UP (ref 150–400)
PLATELET # BLD AUTO: 332 K/UL — SIGNIFICANT CHANGE UP (ref 150–400)
POTASSIUM SERPL-MCNC: 3.6 MMOL/L — SIGNIFICANT CHANGE UP (ref 3.5–5.3)
POTASSIUM SERPL-SCNC: 3.6 MMOL/L — SIGNIFICANT CHANGE UP (ref 3.5–5.3)
PROT SERPL-MCNC: 7.3 GM/DL — SIGNIFICANT CHANGE UP (ref 6–8.3)
PROT UR-MCNC: 30 MG/DL
PROTEIN URINE: 30 MG/DL
PROTHROM AB SERPL-ACNC: 11.5 SEC — SIGNIFICANT CHANGE UP (ref 10.5–13.4)
RBC # BLD: 3.96 M/UL — SIGNIFICANT CHANGE UP (ref 3.8–5.2)
RBC # BLD: 4.26 M/UL — SIGNIFICANT CHANGE UP (ref 3.8–5.2)
RBC # FLD: 12.5 % — SIGNIFICANT CHANGE UP (ref 10.3–14.5)
RBC # FLD: 12.8 % — SIGNIFICANT CHANGE UP (ref 10.3–14.5)
RBC CASTS # UR COMP ASSIST: ABNORMAL /HPF (ref 0–4)
RED BLOOD CELLS URINE: 2 /HPF
SODIUM SERPL-SCNC: 136 MMOL/L — SIGNIFICANT CHANGE UP (ref 135–145)
SOURCE AMPLIFICATION: NORMAL
SP GR SPEC: 1.02 — SIGNIFICANT CHANGE UP (ref 1.01–1.02)
SPECIFIC GRAVITY URINE: 1.01
T PALLIDUM AB SER QL IA: NEGATIVE
T4 SERPL-MCNC: 10.7 UG/DL
TSH SERPL-ACNC: 1.69 UIU/ML
UROBILINOGEN FLD QL: 1
UROBILINOGEN URINE: 0.2 MG/DL
WBC # BLD: 8.36 K/UL — SIGNIFICANT CHANGE UP (ref 3.8–10.5)
WBC # BLD: 8.7 K/UL — SIGNIFICANT CHANGE UP (ref 3.8–10.5)
WBC # FLD AUTO: 8.36 K/UL — SIGNIFICANT CHANGE UP (ref 3.8–10.5)
WBC # FLD AUTO: 8.7 K/UL — SIGNIFICANT CHANGE UP (ref 3.8–10.5)
WBC UR QL: SIGNIFICANT CHANGE UP /HPF (ref 0–5)
WHITE BLOOD CELLS URINE: 0 /HPF

## 2023-07-18 PROCEDURE — 85025 COMPLETE CBC W/AUTO DIFF WBC: CPT

## 2023-07-18 PROCEDURE — 76817 TRANSVAGINAL US OBSTETRIC: CPT

## 2023-07-18 PROCEDURE — 99284 EMERGENCY DEPT VISIT MOD MDM: CPT | Mod: 25

## 2023-07-18 PROCEDURE — 86900 BLOOD TYPING SEROLOGIC ABO: CPT

## 2023-07-18 PROCEDURE — 80053 COMPREHEN METABOLIC PANEL: CPT

## 2023-07-18 PROCEDURE — 85610 PROTHROMBIN TIME: CPT

## 2023-07-18 PROCEDURE — 86901 BLOOD TYPING SEROLOGIC RH(D): CPT

## 2023-07-18 PROCEDURE — 85730 THROMBOPLASTIN TIME PARTIAL: CPT

## 2023-07-18 PROCEDURE — 36415 COLL VENOUS BLD VENIPUNCTURE: CPT

## 2023-07-18 PROCEDURE — 86850 RBC ANTIBODY SCREEN: CPT

## 2023-07-18 PROCEDURE — 84702 CHORIONIC GONADOTROPIN TEST: CPT

## 2023-07-18 PROCEDURE — 76817 TRANSVAGINAL US OBSTETRIC: CPT | Mod: 26

## 2023-07-18 PROCEDURE — 99284 EMERGENCY DEPT VISIT MOD MDM: CPT

## 2023-07-18 PROCEDURE — 87086 URINE CULTURE/COLONY COUNT: CPT

## 2023-07-18 PROCEDURE — 81001 URINALYSIS AUTO W/SCOPE: CPT

## 2023-07-18 NOTE — ED STATDOCS - OBJECTIVE STATEMENT
31 y/o female w/PMHx of   x2, lupus presents to ED currently 11 weeks pregnant c/o vaginal bleeding onset today with associated abdominal cramping. Pt went to MD yesterday and was told it was normal spotting, but patient reports that today there is significant bleeding, so comes to ED for further evaluation. last US done yesterday with no pertinent findings. Denies dizziness and lightheadedness. No abdominal pain at this time. OBGYN: Dr. Catalan.

## 2023-07-18 NOTE — ED STATDOCS - PATIENT PORTAL LINK FT
You can access the FollowMyHealth Patient Portal offered by [facility] by registering at the following website: [email or web address]. By joining [facility]’s FollowMyHealth portal, you will also be able to view your health information using other applications (apps) compatible with our system.

## 2023-07-18 NOTE — ED STATDOCS - CLINICAL SUMMARY MEDICAL DECISION MAKING FREE TEXT BOX
signed Shruthi Kenyon PA-C Received patient in sign out from SHANIA Milton.  Pt with vaginal bleeding in first trimester. hgb did not result yet but hct stable. sono with subchorionic hemorrhage and debris in gestational sac. Will DC pt, pelvic rest, f/u OB Catalan tomorrow. Pt feeling well at DC, agrees with DC and plan of care.

## 2023-07-18 NOTE — ED STATDOCS - PROGRESS NOTE DETAILS
pt aware of her labs and imaging results awaiting repeat cbc will continue to follow. -Shirley Milton PA-C pt aware of her labs and imaging results awaiting repeat cbc will continue to follow. pt will fu with Dr. Catalan and bell loot instructed. -Shirley Milton PA-C

## 2023-07-18 NOTE — ED STATDOCS - NSFOLLOWUPINSTRUCTIONS_ED_ALL_ED_FT
Threatened Miscarriage  A threatened miscarriage is when a woman bleeds in her vagina during the first 20 weeks of pregnancy but the pregnancy has not ended. The doctor will do tests to make sure you are still pregnant. This condition does not mean your pregnancy will end, but it does increase the risk that it will end (miscarriage).    What are the causes?  Normally, the cause of this condition is not known.    What increases the risk?  These things may make a pregnant woman more likely to lose a pregnancy:    Certain health problems    Conditions that affect hormones, such as thyroid disease or polycystic ovary syndrome.  Diabetes.  Disorders that cause the body's disease-fighting system to attack itself by mistake.  Infections.  Bleeding problems.  Being very overweight.  Lifestyle factors    Using products that have tobacco or nicotine in them.  Being around tobacco smoke.  Having a lot of caffeine.  Using drugs.  Problems with reproductive organs or parts    Having a cervix that opens and thins before you are ready to give birth. The cervix is the lowest part of the womb.  Having Asherman syndrome, which leads to:  Scars in the womb.  The womb being an abnormal shape.  Growths (fibroids) in the womb.  Problems in the body that are present at birth.  Infection of the cervix or womb.  Personal or health history    Injury.  Having lost an unborn baby before.  Being younger than age 18 or older than age 35.  Being around a harmful substance, such as radiation.  Having lead or other heavy metals in:  Things you eat or drink.  The air around you.  Using certain medicines.  What are the signs or symptoms?  Bleeding from the vagina. You may also have cramps or pain.  Mild pain or cramps in your belly.  How is this diagnosed?    The doctor will do tests, such as an ultrasound to make sure you are still pregnant.  How is this treated?  There are no treatments that prevent loss of pregnancy. But you need to do the right things to take care of yourself at home.    Follow these instructions at home:  Get a lot of rest.  Do not have sex or douche if there is bleeding in the vagina.  Do not put things such as tampons in the vagina if it is bleeding.  Do not smoke or use drugs.  Do not drink alcohol.  Avoid caffeine.  Keep all follow-up visits while you are pregnant.  Contact a doctor if:  You are pregnant and you have one of these:  Light bleeding coming from your vagina.  Spots of blood coming from your vagina.  You have belly pain or cramping.  You have a fever.  Get help right away if:  Blood soaks through 2 large pads an hour for more than 2 hours.  Clots of blood come from your vagina.  Tissue comes out of your vagina.  Fluid leaks or gushes from your vagina.  You have very bad pain in your low back.  You have very bad cramps in your belly.  You have a fever, chills, and very bad belly pain.  Summary  A threatened miscarriage is when a woman bleeds in her vagina during the first 20 weeks of pregnancy but the pregnancy has not ended.  Normally, the cause of this condition is not known.  Symptoms include bleeding in the vagina or mild pain or cramps in your belly.  There are no treatments that prevent loss of pregnancy.  Keep all follow-up visits while you are pregnant.  This information is not intended to replace advice given to you by your health care provider. Make sure you discuss any questions you have with your health care provider. FOLLOW UP WITH DR PULIDO TOMORROW. CALL THE OFFICE TO MAKE AN APPOINTMENT. RETURN TO ER FOR ANY WORSENING SYMPTOMS OR NEW CONCERNS. YOU WILL NEED A REPEAT ULTRASOUND.     Threatened Miscarriage  A threatened miscarriage is when a woman bleeds in her vagina during the first 20 weeks of pregnancy but the pregnancy has not ended. The doctor will do tests to make sure you are still pregnant. This condition does not mean your pregnancy will end, but it does increase the risk that it will end (miscarriage).    What are the causes?  Normally, the cause of this condition is not known.    What increases the risk?  These things may make a pregnant woman more likely to lose a pregnancy:    Certain health problems    Conditions that affect hormones, such as thyroid disease or polycystic ovary syndrome.  Diabetes.  Disorders that cause the body's disease-fighting system to attack itself by mistake.  Infections.  Bleeding problems.  Being very overweight.  Lifestyle factors    Using products that have tobacco or nicotine in them.  Being around tobacco smoke.  Having a lot of caffeine.  Using drugs.  Problems with reproductive organs or parts    Having a cervix that opens and thins before you are ready to give birth. The cervix is the lowest part of the womb.  Having Asherman syndrome, which leads to:  Scars in the womb.  The womb being an abnormal shape.  Growths (fibroids) in the womb.  Problems in the body that are present at birth.  Infection of the cervix or womb.  Personal or health history    Injury.  Having lost an unborn baby before.  Being younger than age 18 or older than age 35.  Being around a harmful substance, such as radiation.  Having lead or other heavy metals in:  Things you eat or drink.  The air around you.  Using certain medicines.  What are the signs or symptoms?  Bleeding from the vagina. You may also have cramps or pain.  Mild pain or cramps in your belly.  How is this diagnosed?    The doctor will do tests, such as an ultrasound to make sure you are still pregnant.  How is this treated?  There are no treatments that prevent loss of pregnancy. But you need to do the right things to take care of yourself at home.    Follow these instructions at home:  Get a lot of rest.  Do not have sex or douche if there is bleeding in the vagina.  Do not put things such as tampons in the vagina if it is bleeding.  Do not smoke or use drugs.  Do not drink alcohol.  Avoid caffeine.  Keep all follow-up visits while you are pregnant.  Contact a doctor if:  You are pregnant and you have one of these:  Light bleeding coming from your vagina.  Spots of blood coming from your vagina.  You have belly pain or cramping.  You have a fever.  Get help right away if:  Blood soaks through 2 large pads an hour for more than 2 hours.  Clots of blood come from your vagina.  Tissue comes out of your vagina.  Fluid leaks or gushes from your vagina.  You have very bad pain in your low back.  You have very bad cramps in your belly.  You have a fever, chills, and very bad belly pain.  Summary  A threatened miscarriage is when a woman bleeds in her vagina during the first 20 weeks of pregnancy but the pregnancy has not ended.  Normally, the cause of this condition is not known.  Symptoms include bleeding in the vagina or mild pain or cramps in your belly.  There are no treatments that prevent loss of pregnancy.  Keep all follow-up visits while you are pregnant.  This information is not intended to replace advice given to you by your health care provider. Make sure you discuss any questions you have with your health care provider.

## 2023-07-18 NOTE — ED STATDOCS - CARE PROVIDER_API CALL
Smooth Catalan  Obstetrics and Gynecology  [address] [place] [address]  Phone: [phone]  Fax: [phone]  Follow Up Time: Urgent

## 2023-07-18 NOTE — ED ADULT TRIAGE NOTE - CHIEF COMPLAINT QUOTE
vaginal bleeding started 30 mins pta associated with abd pain. currently 11 weeks pregnant. .  x 2. MARLO Catalan.

## 2023-07-18 NOTE — ED ADULT NURSE NOTE - OBJECTIVE STATEMENT
31 y/o alert female w/PMHx of , lupus and antiphospholipid syndrome presents to ED currently 11 weeks pregnant c/o vaginal bleeding onset today "30 minutes ago" with associated abdominal cramping. pt reports that so far she has went through 1 pad and reports BRB in color. Denies dizziness and lightheadedness.

## 2023-07-19 ENCOUNTER — APPOINTMENT (OUTPATIENT)
Dept: OBGYN | Facility: CLINIC | Age: 33
End: 2023-07-19
Payer: COMMERCIAL

## 2023-07-19 VITALS
SYSTOLIC BLOOD PRESSURE: 98 MMHG | WEIGHT: 135 LBS | DIASTOLIC BLOOD PRESSURE: 60 MMHG | HEIGHT: 59 IN | BODY MASS INDEX: 27.21 KG/M2

## 2023-07-19 DIAGNOSIS — N76.0 ACUTE VAGINITIS: ICD-10-CM

## 2023-07-19 DIAGNOSIS — B96.89 ACUTE VAGINITIS: ICD-10-CM

## 2023-07-19 LAB
BACTERIA UR CULT: NORMAL
CANDIDA VAG CYTO: DETECTED
CMV IGG SERPL QL: >10 U/ML
CMV IGG SERPL-IMP: POSITIVE
CMV IGM SERPL QL: <8 AU/ML
CMV IGM SERPL QL: NEGATIVE
CULTURE RESULTS: SIGNIFICANT CHANGE UP
G VAGINALIS+PREV SP MTYP VAG QL MICRO: DETECTED
MEV IGG FLD QL IA: 7 AU/ML
MEV IGG+IGM SER-IMP: NEGATIVE
RUBV IGG FLD-ACNC: 1.2 INDEX
RUBV IGG SER-IMP: POSITIVE
SPECIMEN SOURCE: SIGNIFICANT CHANGE UP
T GONDII AB SER-IMP: NEGATIVE
T GONDII AB SER-IMP: NEGATIVE
T GONDII IGG SER QL: <3 IU/ML
T GONDII IGM SER QL: <3 AU/ML
T VAGINALIS VAG QL WET PREP: NOT DETECTED
VZV AB TITR SER: POSITIVE
VZV IGG SER IF-ACNC: 604 INDEX

## 2023-07-19 PROCEDURE — 99211 OFF/OP EST MAY X REQ PHY/QHP: CPT | Mod: 25

## 2023-07-19 PROCEDURE — 76815 OB US LIMITED FETUS(S): CPT

## 2023-07-20 LAB
B19V IGG SER QL IA: 0.73 INDEX
B19V IGG+IGM SER-IMP: NEGATIVE
B19V IGG+IGM SER-IMP: NORMAL
B19V IGM FLD-ACNC: 0.17 INDEX
B19V IGM SER-ACNC: NEGATIVE
FMR1 GENE MUT ANL BLD/T: NORMAL

## 2023-07-22 LAB — AR GENE MUT ANL BLD/T: NORMAL

## 2023-07-25 LAB — CYTOLOGY CVX/VAG DOC THIN PREP: ABNORMAL

## 2023-07-26 ENCOUNTER — NON-APPOINTMENT (OUTPATIENT)
Age: 33
End: 2023-07-26

## 2023-07-28 ENCOUNTER — NON-APPOINTMENT (OUTPATIENT)
Age: 33
End: 2023-07-28

## 2023-07-28 LAB — HPV HIGH+LOW RISK DNA PNL CVX: DETECTED

## 2023-07-31 ENCOUNTER — NON-APPOINTMENT (OUTPATIENT)
Age: 33
End: 2023-07-31

## 2023-08-01 ENCOUNTER — LABORATORY RESULT (OUTPATIENT)
Age: 33
End: 2023-08-01

## 2023-08-01 ENCOUNTER — NON-APPOINTMENT (OUTPATIENT)
Age: 33
End: 2023-08-01

## 2023-08-01 ENCOUNTER — ASOB RESULT (OUTPATIENT)
Age: 33
End: 2023-08-01

## 2023-08-01 ENCOUNTER — APPOINTMENT (OUTPATIENT)
Dept: ANTEPARTUM | Facility: CLINIC | Age: 33
End: 2023-08-01
Payer: MEDICAID

## 2023-08-01 PROCEDURE — 36415 COLL VENOUS BLD VENIPUNCTURE: CPT

## 2023-08-01 PROCEDURE — 36416 COLLJ CAPILLARY BLOOD SPEC: CPT

## 2023-08-01 PROCEDURE — 76813 OB US NUCHAL MEAS 1 GEST: CPT

## 2023-08-02 ENCOUNTER — APPOINTMENT (OUTPATIENT)
Dept: OBGYN | Facility: CLINIC | Age: 33
End: 2023-08-02
Payer: MEDICAID

## 2023-08-02 VITALS
WEIGHT: 133 LBS | SYSTOLIC BLOOD PRESSURE: 102 MMHG | HEIGHT: 59 IN | DIASTOLIC BLOOD PRESSURE: 66 MMHG | BODY MASS INDEX: 26.81 KG/M2

## 2023-08-02 PROCEDURE — 99213 OFFICE O/P EST LOW 20 MIN: CPT | Mod: TH,25

## 2023-08-03 ENCOUNTER — RX RENEWAL (OUTPATIENT)
Age: 33
End: 2023-08-03

## 2023-08-03 LAB — CFTR MUT TESTED BLD/T: NEGATIVE

## 2023-08-04 LAB
ADDITIONAL US: NORMAL
COMMENTS: AFP: NORMAL
CRL SCAN TWIN B: NORMAL
CRL SCAN: NORMAL
CROWN RUMP LENGTH TWIN B: NORMAL
CROWN RUMP LENGTH: 68.4 MM
DOWN SYNDROME AGE RISK: NORMAL
DOWN SYNDROME INTERPRETATION: NORMAL
DOWN SYNDROME SCREENING RISK: NORMAL
GEST. AGE ON COLLECTION DATE: 12.9 WEEKS
HCG MOM: 0.37
HCG VALUE: 36.6 IU/ML
HIV1+2 AB SPEC QL IA.RAPID: REACTIVE
MATERNAL AGE AT EDD: 33.5 YR
NOTE: AFP: NORMAL
NT MOM TWIN B: NORMAL
NT TWIN B: NORMAL
NUCHAL TRANSLUCENCY (NT): 2.3 MM
NUCHAL TRANSLUCENCY MOM: 1.3
NUMBER OF FETUSES: 1
PAPP-A MOM: 1
PAPP-A VALUE: 1307.8 NG/ML
RACE: NORMAL
RESULTS AFP: NORMAL
SONOGRAPHER ID#: NORMAL
SUBMIT PART 2 SAMPLE USING: NORMAL
TEST RESULTS: AFP: NORMAL
TRISOMY 18 AGE RISK: NORMAL
TRISOMY 18 INTERPRETATION: NORMAL
TRISOMY 18 SCREENING RISK: NORMAL
WEIGHT AFP: 135 LBS

## 2023-08-07 ENCOUNTER — NON-APPOINTMENT (OUTPATIENT)
Age: 33
End: 2023-08-07

## 2023-08-07 LAB — HIV 2 PROVIRAL DNA SERPL QL NAA+PROBE: NOT DETECTED

## 2023-08-15 ENCOUNTER — NON-APPOINTMENT (OUTPATIENT)
Age: 33
End: 2023-08-15

## 2023-08-16 ENCOUNTER — APPOINTMENT (OUTPATIENT)
Dept: OBGYN | Facility: CLINIC | Age: 33
End: 2023-08-16
Payer: COMMERCIAL

## 2023-08-16 VITALS
DIASTOLIC BLOOD PRESSURE: 68 MMHG | HEIGHT: 59 IN | SYSTOLIC BLOOD PRESSURE: 102 MMHG | BODY MASS INDEX: 26.41 KG/M2 | WEIGHT: 131 LBS

## 2023-08-16 DIAGNOSIS — B37.31 ACUTE CANDIDIASIS OF VULVA AND VAGINA: ICD-10-CM

## 2023-08-16 PROCEDURE — 99212 OFFICE O/P EST SF 10 MIN: CPT

## 2023-08-17 LAB
CANDIDA VAG CYTO: DETECTED
G VAGINALIS+PREV SP MTYP VAG QL MICRO: DETECTED
T VAGINALIS VAG QL WET PREP: NOT DETECTED

## 2023-08-28 ENCOUNTER — APPOINTMENT (OUTPATIENT)
Dept: ANTEPARTUM | Facility: CLINIC | Age: 33
End: 2023-08-28
Payer: COMMERCIAL

## 2023-08-28 ENCOUNTER — ASOB RESULT (OUTPATIENT)
Age: 33
End: 2023-08-28

## 2023-08-28 PROCEDURE — 76817 TRANSVAGINAL US OBSTETRIC: CPT

## 2023-08-28 PROCEDURE — 76815 OB US LIMITED FETUS(S): CPT

## 2023-08-29 ENCOUNTER — LABORATORY RESULT (OUTPATIENT)
Age: 33
End: 2023-08-29

## 2023-08-29 ENCOUNTER — APPOINTMENT (OUTPATIENT)
Dept: RHEUMATOLOGY | Facility: CLINIC | Age: 33
End: 2023-08-29
Payer: MEDICAID

## 2023-08-29 VITALS
OXYGEN SATURATION: 99 % | DIASTOLIC BLOOD PRESSURE: 70 MMHG | WEIGHT: 133 LBS | SYSTOLIC BLOOD PRESSURE: 103 MMHG | BODY MASS INDEX: 26.81 KG/M2 | HEART RATE: 82 BPM | HEIGHT: 59 IN

## 2023-08-29 PROCEDURE — 99214 OFFICE O/P EST MOD 30 MIN: CPT

## 2023-08-29 NOTE — ASSESSMENT
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 SLE - on Plaquenil 200 mg BID - no side effects -no rashes or active symptoms - pregnant at 17 weeks - on lovenox once daily - and  mg daily no flare since the last visit - repeat serologies 2. 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 headaches - twice a week - has not seen neuro 4. post covid 19 - January - recovered fully - 5. weakness -no weakness on exam today 6. chest pain - referral to cardiology - random - no pain today - maybe twice in 1 month 7. GI discomfort -  referral to gastroenterology 8. pregnant at 17 weeks -  lovenox daiy now due to subchorionic hemorrhage     I reviewed previous labs results with patients. labs today Diagnosis and Prognosis discussed Continue with current medications medications refilled F/u 3  months

## 2023-08-29 NOTE — HISTORY OF PRESENT ILLNESS
[___ Month(s) Ago] : [unfilled] month(s) ago [FreeTextEntry1] : 17 weeks pregnant expecting a baby boy. LMP 05/04/2023 02/08/2024 now on lovenox daily no flares since the last visit last labs with normal CBC and CMP - low titer DsDNA  [FreeTextEntry3] : 2018 [FreeTextEntry4] : hydroxychloroquine - coumadin [FreeTextEntry7] : DsDNA [FreeTextEntry6] : APS with right arm arterial occlusion [Skin Lesions] : no lesions [Muscle Weakness] : no muscle weakness [None] : The patient is currently asymptomatic

## 2023-08-29 NOTE — PHYSICAL EXAM
[General Appearance - Alert] : alert [General Appearance - In No Acute Distress] : in no acute distress [Outer Ear] : the ears and nose were normal in appearance [Neck Appearance] : the appearance of the neck was normal [Oropharynx] : the oropharynx was normal [Neck Cervical Mass (___cm)] : no neck mass was observed [Jugular Venous Distention Increased] : there was no jugular-venous distention [Thyroid Diffuse Enlargement] : the thyroid was not enlarged [Thyroid Nodule] : there were no palpable thyroid nodules [Auscultation Breath Sounds / Voice Sounds] : lungs were clear to auscultation bilaterally [Heart Rate And Rhythm] : heart rate was normal and rhythm regular [Heart Sounds] : normal S1 and S2 [Heart Sounds Gallop] : no gallops [Murmurs] : no murmurs [Heart Sounds Pericardial Friction Rub] : no pericardial rub [Bowel Sounds] : normal bowel sounds [Abdomen Soft] : soft [Abdomen Tenderness] : non-tender [Abdomen Mass (___ Cm)] : no abdominal mass palpated [Cervical Lymph Nodes Enlarged Posterior Bilaterally] : posterior cervical [Cervical Lymph Nodes Enlarged Anterior Bilaterally] : anterior cervical [Supraclavicular Lymph Nodes Enlarged Bilaterally] : supraclavicular [No CVA Tenderness] : no ~M costovertebral angle tenderness [No Spinal Tenderness] : no spinal tenderness [Abnormal Walk] : normal gait [Nail Clubbing] : no clubbing  or cyanosis of the fingernails [Musculoskeletal - Swelling] : no joint swelling seen [Motor Tone] : muscle strength and tone were normal [Skin Color & Pigmentation] : normal skin color and pigmentation [Skin Turgor] : normal skin turgor [] : no rash [FreeTextEntry1] : no rashes [Oriented To Time, Place, And Person] : oriented to person, place, and time [Impaired Insight] : insight and judgment were intact [Affect] : the affect was normal

## 2023-08-31 LAB
ALBUMIN SERPL ELPH-MCNC: 3.9 G/DL
ALP BLD-CCNC: 44 U/L
ALT SERPL-CCNC: 12 U/L
ANION GAP SERPL CALC-SCNC: 12 MMOL/L
APPEARANCE: CLEAR
AST SERPL-CCNC: 13 U/L
BILIRUB SERPL-MCNC: 0.2 MG/DL
BILIRUBIN URINE: NEGATIVE
BLOOD URINE: ABNORMAL
BUN SERPL-MCNC: 10 MG/DL
C3 SERPL-MCNC: 94 MG/DL
C4 SERPL-MCNC: 18 MG/DL
CALCIUM SERPL-MCNC: 9.3 MG/DL
CHLORIDE SERPL-SCNC: 104 MMOL/L
CO2 SERPL-SCNC: 23 MMOL/L
COLOR: YELLOW
CREAT SERPL-MCNC: 0.56 MG/DL
CREAT SPEC-SCNC: 98 MG/DL
CREAT/PROT UR: 0.1 RATIO
CRP SERPL-MCNC: <3 MG/L
EGFR: 124 ML/MIN/1.73M2
GLUCOSE QUALITATIVE U: NEGATIVE MG/DL
GLUCOSE SERPL-MCNC: 79 MG/DL
KETONES URINE: NEGATIVE MG/DL
LEUKOCYTE ESTERASE URINE: NEGATIVE
NITRITE URINE: NEGATIVE
PH URINE: 6
POTASSIUM SERPL-SCNC: 4.3 MMOL/L
PROT SERPL-MCNC: 6.3 G/DL
PROT UR-MCNC: 9 MG/DL
PROTEIN URINE: NORMAL MG/DL
SODIUM SERPL-SCNC: 138 MMOL/L
SPECIFIC GRAVITY URINE: 1.02
UROBILINOGEN URINE: 0.2 MG/DL

## 2023-09-01 ENCOUNTER — APPOINTMENT (OUTPATIENT)
Dept: ANTEPARTUM | Facility: CLINIC | Age: 33
End: 2023-09-01
Payer: MEDICAID

## 2023-09-01 PROCEDURE — 36415 COLL VENOUS BLD VENIPUNCTURE: CPT

## 2023-09-05 ENCOUNTER — NON-APPOINTMENT (OUTPATIENT)
Age: 33
End: 2023-09-05

## 2023-09-06 ENCOUNTER — APPOINTMENT (OUTPATIENT)
Dept: OBGYN | Facility: CLINIC | Age: 33
End: 2023-09-06

## 2023-09-06 LAB
ADDITIONAL US: NORMAL
AFP MOM: 0.98
AFP VALUE: 40.3 NG/ML
COLLECTED ON 2: NORMAL
COLLECTED ON: NORMAL
CRL SCAN TWIN B: NORMAL
CRL SCAN: NORMAL
CROWN RUMP LENGTH TWIN B: NORMAL
CROWN RUMP LENGTH: 68.4 MM
DIA MOM: 0.99
DIA VALUE: 160.1 PG/ML
DOWN SYNDROME AGE RISK: NORMAL
DOWN SYNDROME INTERPRETATION: NORMAL
DOWN SYNDROME SCREENING RISK: NORMAL
FIRST TRIMESTER SAMPLE: NORMAL
GEST. AGE ON COLLECTION DATE: 12.9 WEEKS
GESTATIONAL AGE: 17.3 WEEKS
HCG MOM: 0.33
HCG VALUE: 10.3 IU/ML
INSULIN DEP DIABETES: NO
MATERNAL AGE AT EDD: 33.5 YR
NT MOM TWIN B: NORMAL
NT TWIN B: NORMAL
NUCHAL TRANSLUCENCY (NT): 2.3 MM
NUCHAL TRANSLUCENCY MOM: 1.3
NUMBER OF FETUSES: 1
OPEN SPINA BIFIDA: NORMAL
OSB INTERPRETATION: NORMAL
PAPP-A MOM: 1
PAPP-A VALUE: 1307.8 NG/ML
RACE: NORMAL
SECOND TRIMESTER SAMPLE: NORMAL
SEQUENTIAL 2 COMMENTS: NORMAL
SEQUENTIAL 2 NOTE: NORMAL
SEQUENTIAL 2 RESULTS: NORMAL
SEQUENTIAL 2 TEST RESULTS: NORMAL
SONOGRAPHER ID#: NORMAL
TRISOMY 18 AGE RISK: NORMAL
TRISOMY 18 INTERPRETATION: NORMAL
TRISOMY 18 SCREENING RISK: NORMAL
UE3 MOM: 1.18
UE3 VALUE: 1.47 NG/ML
WEIGHT AFP: 135 LBS
WEIGHT: 135 LBS

## 2023-09-11 ENCOUNTER — LABORATORY RESULT (OUTPATIENT)
Age: 33
End: 2023-09-11

## 2023-09-11 ENCOUNTER — APPOINTMENT (OUTPATIENT)
Dept: INTERNAL MEDICINE | Facility: CLINIC | Age: 33
End: 2023-09-11
Payer: MEDICAID

## 2023-09-11 VITALS
HEIGHT: 59 IN | DIASTOLIC BLOOD PRESSURE: 60 MMHG | SYSTOLIC BLOOD PRESSURE: 95 MMHG | WEIGHT: 133 LBS | BODY MASS INDEX: 26.81 KG/M2

## 2023-09-11 PROCEDURE — 99205 OFFICE O/P NEW HI 60 MIN: CPT

## 2023-09-12 ENCOUNTER — NON-APPOINTMENT (OUTPATIENT)
Age: 33
End: 2023-09-12

## 2023-09-12 ENCOUNTER — TRANSCRIPTION ENCOUNTER (OUTPATIENT)
Age: 33
End: 2023-09-12

## 2023-09-12 LAB
CD3 CELLS # BLD: 1202 CELLS/UL
CD3 CELLS NFR BLD: 71 %
CD3+CD4+ CELLS # BLD: 590 CELLS/UL
CD3+CD4+ CELLS NFR BLD: 35 %
CD3+CD4+ CELLS/CD3+CD8+ CLL SPEC: 1.09 RATIO
CD3+CD8+ CELLS # SPEC: 541 CELLS/UL
CD3+CD8+ CELLS NFR BLD: 32 %
HIV1 RNA # SERPL NAA+PROBE: NORMAL
HIV1 RNA # SERPL NAA+PROBE: NORMAL COPIES/ML
HIV1+2 AB SPEC QL IA.RAPID: ABNORMAL
HIV1+2 AB SPEC QL IA.RAPID: REACTIVE
HIVABINT: NORMAL
VIRAL LOAD INTERP: NORMAL
VIRAL LOAD LOG: NORMAL LG COP/ML

## 2023-09-12 RX ORDER — ENOXAPARIN SODIUM 60 MG/.6ML
60 INJECTION, SOLUTION SUBCUTANEOUS
Qty: 6 | Refills: 5 | Status: DISCONTINUED | COMMUNITY
Start: 2023-06-16 | End: 2023-09-12

## 2023-09-13 ENCOUNTER — APPOINTMENT (OUTPATIENT)
Dept: OBGYN | Facility: CLINIC | Age: 33
End: 2023-09-13
Payer: MEDICAID

## 2023-09-13 VITALS
SYSTOLIC BLOOD PRESSURE: 103 MMHG | HEART RATE: 95 BPM | TEMPERATURE: 98.7 F | DIASTOLIC BLOOD PRESSURE: 68 MMHG | OXYGEN SATURATION: 98 % | HEIGHT: 59 IN

## 2023-09-13 PROCEDURE — 99212 OFFICE O/P EST SF 10 MIN: CPT | Mod: TH

## 2023-09-13 NOTE — PATIENT PROFILE ADULT - BRADEN SENSORY
Doretha is s/p takedown of prior end ileostomy, creation of ileal J-pouch, and creation of new diverting loop ileostomy on 9/11. She calls in with bloody mucous and urine from her rectum. She also has no control of her bladder.     LVM x2    I tried Doretha twice and left a VM but she didn't . She is at [facility]  ER. Update inpatient team.    (4) no impairment

## 2023-09-14 ENCOUNTER — TRANSCRIPTION ENCOUNTER (OUTPATIENT)
Age: 33
End: 2023-09-14

## 2023-09-15 ENCOUNTER — TRANSCRIPTION ENCOUNTER (OUTPATIENT)
Age: 33
End: 2023-09-15

## 2023-09-15 ENCOUNTER — NON-APPOINTMENT (OUTPATIENT)
Age: 33
End: 2023-09-15

## 2023-09-15 LAB — HTLV I+II AB SER QL: NORMAL

## 2023-09-17 LAB — HIV 2 PROVIRAL DNA SERPL QL NAA+PROBE: NOT DETECTED

## 2023-09-25 ENCOUNTER — APPOINTMENT (OUTPATIENT)
Dept: ANTEPARTUM | Facility: CLINIC | Age: 33
End: 2023-09-25
Payer: MEDICAID

## 2023-09-25 ENCOUNTER — APPOINTMENT (OUTPATIENT)
Dept: ANTEPARTUM | Facility: CLINIC | Age: 33
End: 2023-09-25

## 2023-09-25 ENCOUNTER — APPOINTMENT (OUTPATIENT)
Dept: MATERNAL FETAL MEDICINE | Facility: CLINIC | Age: 33
End: 2023-09-25

## 2023-09-25 ENCOUNTER — APPOINTMENT (OUTPATIENT)
Dept: MATERNAL FETAL MEDICINE | Facility: CLINIC | Age: 33
End: 2023-09-25
Payer: MEDICAID

## 2023-09-25 ENCOUNTER — ASOB RESULT (OUTPATIENT)
Age: 33
End: 2023-09-25

## 2023-09-25 VITALS
WEIGHT: 138.06 LBS | HEART RATE: 92 BPM | DIASTOLIC BLOOD PRESSURE: 70 MMHG | BODY MASS INDEX: 27.83 KG/M2 | RESPIRATION RATE: 16 BRPM | OXYGEN SATURATION: 99 % | SYSTOLIC BLOOD PRESSURE: 102 MMHG | HEIGHT: 59 IN

## 2023-09-25 DIAGNOSIS — R07.89 OTHER CHEST PAIN: ICD-10-CM

## 2023-09-25 DIAGNOSIS — I77.1 STRICTURE OF ARTERY: ICD-10-CM

## 2023-09-25 DIAGNOSIS — I82.721 CHRONIC EMBOLISM AND THROMBOSIS OF DEEP VEINS OF RIGHT UPPER EXTREMITY: ICD-10-CM

## 2023-09-25 DIAGNOSIS — R19.4 CHANGE IN BOWEL HABIT: ICD-10-CM

## 2023-09-25 DIAGNOSIS — Z87.898 PERSONAL HISTORY OF OTHER SPECIFIED CONDITIONS: ICD-10-CM

## 2023-09-25 DIAGNOSIS — Z86.79 PERSONAL HISTORY OF OTHER DISEASES OF THE CIRCULATORY SYSTEM: ICD-10-CM

## 2023-09-25 DIAGNOSIS — Z11.59 ENCOUNTER FOR SCREENING FOR OTHER VIRAL DISEASES: ICD-10-CM

## 2023-09-25 DIAGNOSIS — Z87.39 PERSONAL HISTORY OF OTHER DISEASES OF THE MUSCULOSKELETAL SYSTEM AND CONNECTIVE TISSUE: ICD-10-CM

## 2023-09-25 DIAGNOSIS — M79.672 PAIN IN RIGHT FOOT: ICD-10-CM

## 2023-09-25 DIAGNOSIS — R14.0 ABDOMINAL DISTENSION (GASEOUS): ICD-10-CM

## 2023-09-25 DIAGNOSIS — I70.208 UNSPECIFIED ATHEROSCLEROSIS OF NATIVE ARTERIES OF EXTREMITIES, OTHER EXTREMITY: ICD-10-CM

## 2023-09-25 DIAGNOSIS — R10.9 UNSPECIFIED ABDOMINAL PAIN: ICD-10-CM

## 2023-09-25 DIAGNOSIS — Z86.19 PERSONAL HISTORY OF OTHER INFECTIOUS AND PARASITIC DISEASES: ICD-10-CM

## 2023-09-25 DIAGNOSIS — M79.671 PAIN IN RIGHT FOOT: ICD-10-CM

## 2023-09-25 DIAGNOSIS — M32.9 SYSTEMIC LUPUS ERYTHEMATOSUS, UNSPECIFIED: ICD-10-CM

## 2023-09-25 DIAGNOSIS — Z86.718 PERSONAL HISTORY OF OTHER VENOUS THROMBOSIS AND EMBOLISM: ICD-10-CM

## 2023-09-25 DIAGNOSIS — R89.9 UNSPECIFIED ABNORMAL FINDING IN SPECIMENS FROM OTHER ORGANS, SYSTEMS AND TISSUES: ICD-10-CM

## 2023-09-25 DIAGNOSIS — Z11.4 ENCOUNTER FOR SCREENING FOR HUMAN IMMUNODEFICIENCY VIRUS [HIV]: ICD-10-CM

## 2023-09-25 DIAGNOSIS — G89.29 PAIN IN RIGHT FOOT: ICD-10-CM

## 2023-09-25 PROCEDURE — 99214 OFFICE O/P EST MOD 30 MIN: CPT | Mod: TH

## 2023-09-25 PROCEDURE — 76811 OB US DETAILED SNGL FETUS: CPT

## 2023-09-25 PROCEDURE — 76817 TRANSVAGINAL US OBSTETRIC: CPT

## 2023-09-25 RX ORDER — METRONIDAZOLE 500 MG/1
500 TABLET ORAL TWICE DAILY
Qty: 14 | Refills: 0 | Status: DISCONTINUED | COMMUNITY
Start: 2023-07-19 | End: 2023-09-25

## 2023-09-25 RX ORDER — TERCONAZOLE 4 MG/G
0.4 CREAM VAGINAL
Qty: 1 | Refills: 0 | Status: DISCONTINUED | COMMUNITY
Start: 2023-07-26 | End: 2023-09-25

## 2023-09-25 RX ORDER — METRONIDAZOLE 500 MG/1
500 TABLET ORAL TWICE DAILY
Qty: 14 | Refills: 0 | Status: DISCONTINUED | COMMUNITY
Start: 2023-08-17 | End: 2023-09-25

## 2023-09-25 RX ORDER — CLOTRIMAZOLE AND BETAMETHASONE DIPROPIONATE 10; .5 MG/G; MG/G
1-0.05 CREAM TOPICAL TWICE DAILY
Qty: 1 | Refills: 1 | Status: DISCONTINUED | COMMUNITY
Start: 2023-08-16 | End: 2023-09-25

## 2023-09-25 RX ORDER — TERCONAZOLE 80 MG/1
80 SUPPOSITORY VAGINAL
Qty: 3 | Refills: 1 | Status: DISCONTINUED | COMMUNITY
Start: 2023-08-16 | End: 2023-09-25

## 2023-10-10 ENCOUNTER — NON-APPOINTMENT (OUTPATIENT)
Age: 33
End: 2023-10-10

## 2023-10-10 ENCOUNTER — APPOINTMENT (OUTPATIENT)
Dept: ANTEPARTUM | Facility: CLINIC | Age: 33
End: 2023-10-10
Payer: MEDICAID

## 2023-10-10 ENCOUNTER — ASOB RESULT (OUTPATIENT)
Age: 33
End: 2023-10-10

## 2023-10-10 PROCEDURE — 76817 TRANSVAGINAL US OBSTETRIC: CPT

## 2023-10-11 ENCOUNTER — APPOINTMENT (OUTPATIENT)
Dept: OBGYN | Facility: CLINIC | Age: 33
End: 2023-10-11
Payer: MEDICAID

## 2023-10-11 VITALS
DIASTOLIC BLOOD PRESSURE: 58 MMHG | HEIGHT: 59 IN | WEIGHT: 140 LBS | BODY MASS INDEX: 28.22 KG/M2 | SYSTOLIC BLOOD PRESSURE: 118 MMHG

## 2023-10-11 LAB
HIV GENOSURE ARCHIVE 1: NORMAL
HIV1 PROVIR DNA RT + PR + IN MUT DET SEQ: NORMAL
HIV1 PROVIRAL DNA GENTYP BLD MC NAR: NORMAL

## 2023-10-11 PROCEDURE — 90471 IMMUNIZATION ADMIN: CPT

## 2023-10-11 PROCEDURE — 99213 OFFICE O/P EST LOW 20 MIN: CPT | Mod: TH,25

## 2023-10-11 PROCEDURE — 90656 IIV3 VACC NO PRSV 0.5 ML IM: CPT

## 2023-10-17 ENCOUNTER — APPOINTMENT (OUTPATIENT)
Dept: PEDIATRIC CARDIOLOGY | Facility: CLINIC | Age: 33
End: 2023-10-17
Payer: MEDICAID

## 2023-10-17 ENCOUNTER — NON-APPOINTMENT (OUTPATIENT)
Age: 33
End: 2023-10-17

## 2023-10-17 DIAGNOSIS — O35.9XX0 MATERNAL CARE FOR (SUSPECTED) FETAL ABNORMALITY AND DAMAGE, UNSPECIFIED, NOT APPLICABLE OR UNSPECIFIED: ICD-10-CM

## 2023-10-17 DIAGNOSIS — O35.2XX0 MATERNAL CARE FOR (SUSPECTED) HEREDITARY DISEASE IN FETUS, NOT APPLICABLE OR UNSPECIFIED: ICD-10-CM

## 2023-10-17 PROCEDURE — 93325 DOPPLER ECHO COLOR FLOW MAPG: CPT | Mod: 59

## 2023-10-17 PROCEDURE — 76827 ECHO EXAM OF FETAL HEART: CPT

## 2023-10-17 PROCEDURE — 76825 ECHO EXAM OF FETAL HEART: CPT

## 2023-10-17 PROCEDURE — 99203 OFFICE O/P NEW LOW 30 MIN: CPT | Mod: 25

## 2023-10-17 PROCEDURE — 76821 MIDDLE CEREBRAL ARTERY ECHO: CPT

## 2023-10-17 PROCEDURE — 76820 UMBILICAL ARTERY ECHO: CPT

## 2023-10-24 ENCOUNTER — APPOINTMENT (OUTPATIENT)
Dept: ANTEPARTUM | Facility: CLINIC | Age: 33
End: 2023-10-24

## 2023-10-24 ENCOUNTER — NON-APPOINTMENT (OUTPATIENT)
Age: 33
End: 2023-10-24

## 2023-10-24 ENCOUNTER — APPOINTMENT (OUTPATIENT)
Dept: CARDIOLOGY | Facility: CLINIC | Age: 33
End: 2023-10-24
Payer: MEDICAID

## 2023-10-24 VITALS
HEIGHT: 59 IN | DIASTOLIC BLOOD PRESSURE: 70 MMHG | WEIGHT: 139 LBS | BODY MASS INDEX: 28.02 KG/M2 | SYSTOLIC BLOOD PRESSURE: 116 MMHG | HEART RATE: 55 BPM | OXYGEN SATURATION: 100 %

## 2023-10-24 PROCEDURE — 99214 OFFICE O/P EST MOD 30 MIN: CPT | Mod: 25

## 2023-10-24 PROCEDURE — 93000 ELECTROCARDIOGRAM COMPLETE: CPT

## 2023-10-24 RX ORDER — ASPIRIN 81 MG/1
81 TABLET, COATED ORAL
Qty: 90 | Refills: 3 | Status: DISCONTINUED | COMMUNITY
Start: 2023-08-03 | End: 2023-10-24

## 2023-10-25 ENCOUNTER — ASOB RESULT (OUTPATIENT)
Age: 33
End: 2023-10-25

## 2023-10-25 ENCOUNTER — APPOINTMENT (OUTPATIENT)
Dept: ANTEPARTUM | Facility: CLINIC | Age: 33
End: 2023-10-25
Payer: MEDICAID

## 2023-10-25 PROCEDURE — 76817 TRANSVAGINAL US OBSTETRIC: CPT

## 2023-10-31 ENCOUNTER — NON-APPOINTMENT (OUTPATIENT)
Age: 33
End: 2023-10-31

## 2023-11-01 ENCOUNTER — APPOINTMENT (OUTPATIENT)
Dept: OBGYN | Facility: CLINIC | Age: 33
End: 2023-11-01
Payer: MEDICAID

## 2023-11-01 VITALS
DIASTOLIC BLOOD PRESSURE: 60 MMHG | SYSTOLIC BLOOD PRESSURE: 96 MMHG | BODY MASS INDEX: 28.63 KG/M2 | WEIGHT: 142 LBS | HEIGHT: 59 IN

## 2023-11-01 DIAGNOSIS — Z34.92 ENCOUNTER FOR SUPERVISION OF NORMAL PREGNANCY, UNSPECIFIED, SECOND TRIMESTER: ICD-10-CM

## 2023-11-01 PROCEDURE — 99212 OFFICE O/P EST SF 10 MIN: CPT | Mod: TH,25

## 2023-11-02 LAB
BASOPHILS # BLD AUTO: 0.03 K/UL
BASOPHILS NFR BLD AUTO: 0.3 %
EOSINOPHIL # BLD AUTO: 0.05 K/UL
EOSINOPHIL NFR BLD AUTO: 0.6 %
GLUCOSE 1H P 50 G GLC PO SERPL-MCNC: 149 MG/DL
HCT VFR BLD CALC: 33.3 %
HGB BLD-MCNC: 11.3 G/DL
IMM GRANULOCYTES NFR BLD AUTO: 0.2 %
LYMPHOCYTES # BLD AUTO: 1.67 K/UL
LYMPHOCYTES NFR BLD AUTO: 19 %
MAN DIFF?: NORMAL
MCHC RBC-ENTMCNC: 31.6 PG
MCHC RBC-ENTMCNC: 33.9 GM/DL
MCV RBC AUTO: 93 FL
MONOCYTES # BLD AUTO: 0.57 K/UL
MONOCYTES NFR BLD AUTO: 6.5 %
NEUTROPHILS # BLD AUTO: 6.46 K/UL
NEUTROPHILS NFR BLD AUTO: 73.4 %
PLATELET # BLD AUTO: 258 K/UL
RBC # BLD: 3.58 M/UL
RBC # FLD: 13.7 %
WBC # FLD AUTO: 8.8 K/UL

## 2023-11-07 ENCOUNTER — APPOINTMENT (OUTPATIENT)
Dept: ANTEPARTUM | Facility: CLINIC | Age: 33
End: 2023-11-07
Payer: MEDICAID

## 2023-11-07 ENCOUNTER — APPOINTMENT (OUTPATIENT)
Dept: MATERNAL FETAL MEDICINE | Facility: CLINIC | Age: 33
End: 2023-11-07
Payer: MEDICAID

## 2023-11-07 ENCOUNTER — ASOB RESULT (OUTPATIENT)
Age: 33
End: 2023-11-07

## 2023-11-07 VITALS
OXYGEN SATURATION: 99 % | HEIGHT: 59 IN | HEART RATE: 94 BPM | RESPIRATION RATE: 16 BRPM | WEIGHT: 141 LBS | BODY MASS INDEX: 28.43 KG/M2 | DIASTOLIC BLOOD PRESSURE: 70 MMHG | SYSTOLIC BLOOD PRESSURE: 102 MMHG

## 2023-11-07 PROCEDURE — 76816 OB US FOLLOW-UP PER FETUS: CPT

## 2023-11-07 PROCEDURE — 99214 OFFICE O/P EST MOD 30 MIN: CPT | Mod: TH

## 2023-11-07 PROCEDURE — 76817 TRANSVAGINAL US OBSTETRIC: CPT

## 2023-11-14 ENCOUNTER — NON-APPOINTMENT (OUTPATIENT)
Age: 33
End: 2023-11-14

## 2023-11-15 ENCOUNTER — APPOINTMENT (OUTPATIENT)
Dept: OBGYN | Facility: CLINIC | Age: 33
End: 2023-11-15
Payer: MEDICAID

## 2023-11-15 VITALS — SYSTOLIC BLOOD PRESSURE: 100 MMHG | DIASTOLIC BLOOD PRESSURE: 60 MMHG | HEIGHT: 59 IN

## 2023-11-15 PROCEDURE — 99212 OFFICE O/P EST SF 10 MIN: CPT | Mod: TH

## 2023-11-21 ENCOUNTER — APPOINTMENT (OUTPATIENT)
Dept: RHEUMATOLOGY | Facility: CLINIC | Age: 33
End: 2023-11-21

## 2023-11-21 ENCOUNTER — OUTPATIENT (OUTPATIENT)
Dept: INPATIENT UNIT | Facility: HOSPITAL | Age: 33
LOS: 1 days | Discharge: HOME | End: 2023-11-21
Payer: MEDICAID

## 2023-11-21 ENCOUNTER — NON-APPOINTMENT (OUTPATIENT)
Age: 33
End: 2023-11-21

## 2023-11-21 DIAGNOSIS — O26.899 OTHER SPECIFIED PREGNANCY RELATED CONDITIONS, UNSPECIFIED TRIMESTER: ICD-10-CM

## 2023-11-21 LAB
APPEARANCE UR: CLEAR — SIGNIFICANT CHANGE UP
APPEARANCE UR: CLEAR — SIGNIFICANT CHANGE UP
BILIRUB UR-MCNC: NEGATIVE — SIGNIFICANT CHANGE UP
BILIRUB UR-MCNC: NEGATIVE — SIGNIFICANT CHANGE UP
COLOR SPEC: YELLOW — SIGNIFICANT CHANGE UP
COLOR SPEC: YELLOW — SIGNIFICANT CHANGE UP
DIFF PNL FLD: ABNORMAL
DIFF PNL FLD: ABNORMAL
GLUCOSE UR QL: NEGATIVE MG/DL — SIGNIFICANT CHANGE UP
GLUCOSE UR QL: NEGATIVE MG/DL — SIGNIFICANT CHANGE UP
KETONES UR-MCNC: NEGATIVE MG/DL — SIGNIFICANT CHANGE UP
KETONES UR-MCNC: NEGATIVE MG/DL — SIGNIFICANT CHANGE UP
LEUKOCYTE ESTERASE UR-ACNC: ABNORMAL
LEUKOCYTE ESTERASE UR-ACNC: ABNORMAL
NITRITE UR-MCNC: NEGATIVE — SIGNIFICANT CHANGE UP
NITRITE UR-MCNC: NEGATIVE — SIGNIFICANT CHANGE UP
PH UR: 7.5 — SIGNIFICANT CHANGE UP (ref 5–8)
PH UR: 7.5 — SIGNIFICANT CHANGE UP (ref 5–8)
PROT UR-MCNC: 30 MG/DL
PROT UR-MCNC: 30 MG/DL
SP GR SPEC: 1.02 — SIGNIFICANT CHANGE UP (ref 1–1.03)
SP GR SPEC: 1.02 — SIGNIFICANT CHANGE UP (ref 1–1.03)
UROBILINOGEN FLD QL: 1 MG/DL — SIGNIFICANT CHANGE UP (ref 0.2–1)
UROBILINOGEN FLD QL: 1 MG/DL — SIGNIFICANT CHANGE UP (ref 0.2–1)

## 2023-11-21 PROCEDURE — 99221 1ST HOSP IP/OBS SF/LOW 40: CPT | Mod: 25

## 2023-11-21 PROCEDURE — 87086 URINE CULTURE/COLONY COUNT: CPT

## 2023-11-21 PROCEDURE — 99214 OFFICE O/P EST MOD 30 MIN: CPT

## 2023-11-21 PROCEDURE — 59025 FETAL NON-STRESS TEST: CPT

## 2023-11-21 PROCEDURE — 99215 OFFICE O/P EST HI 40 MIN: CPT | Mod: TH,25

## 2023-11-21 PROCEDURE — 81001 URINALYSIS AUTO W/SCOPE: CPT

## 2023-11-21 PROCEDURE — 59025 FETAL NON-STRESS TEST: CPT | Mod: 26

## 2023-11-21 PROCEDURE — 76815 OB US LIMITED FETUS(S): CPT

## 2023-11-21 RX ORDER — ACETAMINOPHEN 500 MG
975 TABLET ORAL ONCE
Refills: 0 | Status: COMPLETED | OUTPATIENT
Start: 2023-11-21 | End: 2023-11-21

## 2023-11-21 RX ADMIN — Medication 975 MILLIGRAM(S): at 11:13

## 2023-11-21 NOTE — CONSULT NOTE ADULT - ASSESSMENT
32 year old at 28 weeks 5 days gestation presents with back pain, now resolved    - Pain improved with 975 mg of Tylenol, reviewed medications patient may take in pregnancy  - Pain correlated with fetal kicks on ultrasound  -  labor precautions reviewed. Indications to return to the hospital discussed.   - Vaginal exam per PA was closed/50/-3   - FHR reactive, BPP 8/8 with overall reassuring fetal status  - safe discharge to home, patient has follow up scheduled with Dr. Catalan. Continue antepartum fetal surveillance as scheduled.     Recommendations discussed with covering Hospitalist, Dr. Bria Torres MD FACOG  Maternal Fetal Medicine

## 2023-11-21 NOTE — CONSULT NOTE ADULT - SUBJECTIVE AND OBJECTIVE BOX
Ms. Burgos was seen and evaluated at bedside. She is feeling overall well at this time. She noted sharp pain in her back that started yesterday. She took 500 mg of tylenol with minimal improvement. Some improvement with heat pack applied. She denies any radiation of the pain, nothing in particular that worsens it. No urinary frequency or hesitancy. No fevers, chills, chest pain, shortness of breath, dizziness or lightheadedness. No contractions or vaginal bleeding. No decreased fetal movement or vaginal pressure. No personal history of renal stones.     Pain improved with 975 mg of tylenol.     PMH: Lupus, Antiphospholipid syndrome, possible VSD, hx of DVT  POBGYN:   1 prior  delivery at 36 weeks gestation  1 full term delivery   x2  3 TOP  1 SAB  Medications: Lovenox, aspirin, plaquenil, prenatal    Vital Signs Last 24 Hrs  BP 90s/60s  HR: 90s  General: No acute distress  Respirations: unlabored  Abdomen: Soft, gravid, nontender  FHR: 130, mod dao, + accels, - decels  toco: none  Sono: Vertex presentation, longitudinal lie, BPP of 8/8, normal fluid

## 2023-11-22 LAB
CULTURE RESULTS: SIGNIFICANT CHANGE UP
CULTURE RESULTS: SIGNIFICANT CHANGE UP
SPECIMEN SOURCE: SIGNIFICANT CHANGE UP
SPECIMEN SOURCE: SIGNIFICANT CHANGE UP

## 2023-11-23 DIAGNOSIS — M54.50 LOW BACK PAIN, UNSPECIFIED: ICD-10-CM

## 2023-11-23 DIAGNOSIS — R82.998 OTHER ABNORMAL FINDINGS IN URINE: ICD-10-CM

## 2023-11-23 DIAGNOSIS — O99.113 OTHER DISEASES OF THE BLOOD AND BLOOD-FORMING ORGANS AND CERTAIN DISORDERS INVOLVING THE IMMUNE MECHANISM COMPLICATING PREGNANCY, THIRD TRIMESTER: ICD-10-CM

## 2023-11-23 DIAGNOSIS — I82.621 ACUTE EMBOLISM AND THROMBOSIS OF DEEP VEINS OF RIGHT UPPER EXTREMITY: ICD-10-CM

## 2023-11-23 DIAGNOSIS — Z79.01 LONG TERM (CURRENT) USE OF ANTICOAGULANTS: ICD-10-CM

## 2023-11-23 DIAGNOSIS — O26.893 OTHER SPECIFIED PREGNANCY RELATED CONDITIONS, THIRD TRIMESTER: ICD-10-CM

## 2023-11-23 DIAGNOSIS — M32.9 SYSTEMIC LUPUS ERYTHEMATOSUS, UNSPECIFIED: ICD-10-CM

## 2023-11-23 DIAGNOSIS — D68.61 ANTIPHOSPHOLIPID SYNDROME: ICD-10-CM

## 2023-11-23 DIAGNOSIS — O99.891 OTHER SPECIFIED DISEASES AND CONDITIONS COMPLICATING PREGNANCY: ICD-10-CM

## 2023-11-23 DIAGNOSIS — O22.33 DEEP PHLEBOTHROMBOSIS IN PREGNANCY, THIRD TRIMESTER: ICD-10-CM

## 2023-11-23 DIAGNOSIS — Z3A.28 28 WEEKS GESTATION OF PREGNANCY: ICD-10-CM

## 2023-11-27 ENCOUNTER — APPOINTMENT (OUTPATIENT)
Dept: CARDIOLOGY | Facility: CLINIC | Age: 33
End: 2023-11-27
Payer: MEDICAID

## 2023-11-27 PROCEDURE — 93306 TTE W/DOPPLER COMPLETE: CPT

## 2023-11-29 ENCOUNTER — APPOINTMENT (OUTPATIENT)
Dept: OBGYN | Facility: CLINIC | Age: 33
End: 2023-11-29
Payer: MEDICAID

## 2023-11-29 VITALS
BODY MASS INDEX: 29.23 KG/M2 | WEIGHT: 145 LBS | HEIGHT: 59 IN | DIASTOLIC BLOOD PRESSURE: 60 MMHG | SYSTOLIC BLOOD PRESSURE: 112 MMHG

## 2023-11-29 LAB
BILIRUB UR QL STRIP: NORMAL
GLUCOSE UR-MCNC: NORMAL
HCG UR QL: 0.2 EU/DL
HGB UR QL STRIP.AUTO: NORMAL
KETONES UR-MCNC: NORMAL
LEUKOCYTE ESTERASE UR QL STRIP: NORMAL
NITRITE UR QL STRIP: NORMAL
PH UR STRIP: 6.5
PROT UR STRIP-MCNC: NORMAL
SP GR UR STRIP: 1.01

## 2023-11-29 PROCEDURE — 99213 OFFICE O/P EST LOW 20 MIN: CPT | Mod: TH,25

## 2023-11-29 PROCEDURE — 81003 URINALYSIS AUTO W/O SCOPE: CPT | Mod: NC,QW

## 2023-12-05 ENCOUNTER — LABORATORY RESULT (OUTPATIENT)
Age: 33
End: 2023-12-05

## 2023-12-05 ENCOUNTER — APPOINTMENT (OUTPATIENT)
Dept: RHEUMATOLOGY | Facility: CLINIC | Age: 33
End: 2023-12-05
Payer: MEDICAID

## 2023-12-05 VITALS
WEIGHT: 143 LBS | OXYGEN SATURATION: 98 % | HEART RATE: 85 BPM | SYSTOLIC BLOOD PRESSURE: 108 MMHG | BODY MASS INDEX: 28.83 KG/M2 | DIASTOLIC BLOOD PRESSURE: 70 MMHG | HEIGHT: 59 IN

## 2023-12-05 PROCEDURE — 99215 OFFICE O/P EST HI 40 MIN: CPT

## 2023-12-07 LAB
ALBUMIN SERPL ELPH-MCNC: 3.7 G/DL
ALP BLD-CCNC: 114 U/L
ALT SERPL-CCNC: 8 U/L
ANION GAP SERPL CALC-SCNC: 10 MMOL/L
APPEARANCE: CLEAR
AST SERPL-CCNC: 12 U/L
BILIRUB SERPL-MCNC: 0.2 MG/DL
BILIRUBIN URINE: NEGATIVE
BLOOD URINE: ABNORMAL
BUN SERPL-MCNC: 7 MG/DL
C3 SERPL-MCNC: 120 MG/DL
C4 SERPL-MCNC: 24 MG/DL
CALCIUM SERPL-MCNC: 8.7 MG/DL
CHLORIDE SERPL-SCNC: 102 MMOL/L
CO2 SERPL-SCNC: 24 MMOL/L
COLOR: YELLOW
CREAT SERPL-MCNC: 0.51 MG/DL
CREAT SPEC-SCNC: 79 MG/DL
CREAT/PROT UR: 0.2 RATIO
DSDNA AB SER-ACNC: 41 IU/ML
EGFR: 126 ML/MIN/1.73M2
GLUCOSE QUALITATIVE U: NEGATIVE MG/DL
GLUCOSE SERPL-MCNC: 76 MG/DL
HCT VFR BLD CALC: 36.1 %
HGB BLD-MCNC: 12.1 G/DL
KETONES URINE: ABNORMAL MG/DL
LEUKOCYTE ESTERASE URINE: ABNORMAL
MCHC RBC-ENTMCNC: 31.6 PG
MCHC RBC-ENTMCNC: 33.5 GM/DL
MCV RBC AUTO: 94.3 FL
NITRITE URINE: NEGATIVE
PH URINE: 6
PLATELET # BLD AUTO: 267 K/UL
POTASSIUM SERPL-SCNC: 4.2 MMOL/L
PROT SERPL-MCNC: 6.4 G/DL
PROT UR-MCNC: 18 MG/DL
PROTEIN URINE: 30 MG/DL
RBC # BLD: 3.83 M/UL
RBC # FLD: 14 %
SODIUM SERPL-SCNC: 136 MMOL/L
SPECIFIC GRAVITY URINE: 1.02
UROBILINOGEN URINE: 1 MG/DL
WBC # FLD AUTO: 8.4 K/UL

## 2023-12-08 ENCOUNTER — NON-APPOINTMENT (OUTPATIENT)
Age: 33
End: 2023-12-08

## 2023-12-11 ENCOUNTER — APPOINTMENT (OUTPATIENT)
Dept: OBGYN | Facility: CLINIC | Age: 33
End: 2023-12-11
Payer: MEDICAID

## 2023-12-11 VITALS
BODY MASS INDEX: 28.83 KG/M2 | WEIGHT: 143 LBS | HEIGHT: 59 IN | DIASTOLIC BLOOD PRESSURE: 60 MMHG | SYSTOLIC BLOOD PRESSURE: 94 MMHG

## 2023-12-11 LAB
BILIRUB UR QL STRIP: NORMAL
CLARITY UR: CLEAR
COLLECTION METHOD: NORMAL
GLUCOSE UR-MCNC: NORMAL
HCG UR QL: 1 EU/DL
HGB UR QL STRIP.AUTO: NORMAL
KETONES UR-MCNC: NORMAL
LEUKOCYTE ESTERASE UR QL STRIP: NORMAL
NITRITE UR QL STRIP: NORMAL
PH UR STRIP: 6
PROT UR STRIP-MCNC: NORMAL
SP GR UR STRIP: 1.02

## 2023-12-11 PROCEDURE — 99212 OFFICE O/P EST SF 10 MIN: CPT | Mod: TH,25

## 2023-12-11 PROCEDURE — 81003 URINALYSIS AUTO W/O SCOPE: CPT | Mod: QW

## 2023-12-12 ENCOUNTER — APPOINTMENT (OUTPATIENT)
Dept: ANTEPARTUM | Facility: CLINIC | Age: 33
End: 2023-12-12

## 2023-12-13 ENCOUNTER — APPOINTMENT (OUTPATIENT)
Dept: ANTEPARTUM | Facility: CLINIC | Age: 33
End: 2023-12-13

## 2023-12-14 ENCOUNTER — ASOB RESULT (OUTPATIENT)
Age: 33
End: 2023-12-14

## 2023-12-14 ENCOUNTER — APPOINTMENT (OUTPATIENT)
Dept: ANTEPARTUM | Facility: CLINIC | Age: 33
End: 2023-12-14
Payer: MEDICAID

## 2023-12-14 PROCEDURE — 76819 FETAL BIOPHYS PROFIL W/O NST: CPT

## 2023-12-14 PROCEDURE — 76821 MIDDLE CEREBRAL ARTERY ECHO: CPT | Mod: 59

## 2023-12-14 PROCEDURE — 76816 OB US FOLLOW-UP PER FETUS: CPT

## 2023-12-14 PROCEDURE — 76820 UMBILICAL ARTERY ECHO: CPT | Mod: 59

## 2023-12-15 NOTE — ED ADULT TRIAGE NOTE - SPO2 (%)
PAST MEDICAL HISTORY:  Cerebral artery occlusion with cerebral infarction Cerebral vascular accident    H/O diabetic retinopathy     Hyperlipidemia Hyperlipidemia    Hypertension     Stage 4 chronic kidney disease     Type 2 diabetes mellitus Diabetes mellitus    
100

## 2023-12-18 ENCOUNTER — APPOINTMENT (OUTPATIENT)
Dept: OBGYN | Facility: CLINIC | Age: 33
End: 2023-12-18
Payer: MEDICAID

## 2023-12-18 PROCEDURE — 59025 FETAL NON-STRESS TEST: CPT

## 2023-12-18 PROCEDURE — 99212 OFFICE O/P EST SF 10 MIN: CPT | Mod: TH,25

## 2023-12-19 ENCOUNTER — ASOB RESULT (OUTPATIENT)
Age: 33
End: 2023-12-19

## 2023-12-19 ENCOUNTER — APPOINTMENT (OUTPATIENT)
Dept: ANTEPARTUM | Facility: CLINIC | Age: 33
End: 2023-12-19
Payer: MEDICAID

## 2023-12-19 PROCEDURE — 76820 UMBILICAL ARTERY ECHO: CPT

## 2023-12-19 PROCEDURE — 76818 FETAL BIOPHYS PROFILE W/NST: CPT

## 2023-12-19 PROCEDURE — 76821 MIDDLE CEREBRAL ARTERY ECHO: CPT

## 2023-12-26 ENCOUNTER — NON-APPOINTMENT (OUTPATIENT)
Age: 33
End: 2023-12-26

## 2023-12-26 ENCOUNTER — APPOINTMENT (OUTPATIENT)
Dept: CARDIOLOGY | Facility: CLINIC | Age: 33
End: 2023-12-26
Payer: MEDICAID

## 2023-12-26 VITALS
OXYGEN SATURATION: 98 % | HEART RATE: 97 BPM | BODY MASS INDEX: 29.23 KG/M2 | WEIGHT: 145 LBS | DIASTOLIC BLOOD PRESSURE: 60 MMHG | HEIGHT: 59 IN | SYSTOLIC BLOOD PRESSURE: 96 MMHG

## 2023-12-26 DIAGNOSIS — Z86.718 PERSONAL HISTORY OF OTHER VENOUS THROMBOSIS AND EMBOLISM: ICD-10-CM

## 2023-12-26 DIAGNOSIS — Q21.0 VENTRICULAR SEPTAL DEFECT: ICD-10-CM

## 2023-12-26 PROCEDURE — 99213 OFFICE O/P EST LOW 20 MIN: CPT

## 2023-12-27 ENCOUNTER — APPOINTMENT (OUTPATIENT)
Dept: OBGYN | Facility: CLINIC | Age: 33
End: 2023-12-27
Payer: MEDICAID

## 2023-12-27 LAB
BILIRUB UR QL STRIP: NORMAL
CLARITY UR: CLEAR
COLLECTION METHOD: NORMAL
GLUCOSE UR-MCNC: NORMAL
HCG UR QL: 0.2 EU/DL
HGB UR QL STRIP.AUTO: NORMAL
KETONES UR-MCNC: NORMAL
LEUKOCYTE ESTERASE UR QL STRIP: NORMAL
NITRITE UR QL STRIP: NORMAL
PH UR STRIP: 6
PROT UR STRIP-MCNC: NORMAL
SP GR UR STRIP: 1.03

## 2023-12-27 PROCEDURE — 59025 FETAL NON-STRESS TEST: CPT

## 2023-12-27 PROCEDURE — 81003 URINALYSIS AUTO W/O SCOPE: CPT | Mod: NC,QW

## 2023-12-27 PROCEDURE — 99212 OFFICE O/P EST SF 10 MIN: CPT | Mod: TH,25

## 2023-12-28 ENCOUNTER — ASOB RESULT (OUTPATIENT)
Age: 33
End: 2023-12-28

## 2023-12-28 ENCOUNTER — APPOINTMENT (OUTPATIENT)
Dept: ANTEPARTUM | Facility: CLINIC | Age: 33
End: 2023-12-28
Payer: MEDICAID

## 2023-12-28 PROCEDURE — 76821 MIDDLE CEREBRAL ARTERY ECHO: CPT

## 2023-12-28 PROCEDURE — 76820 UMBILICAL ARTERY ECHO: CPT

## 2023-12-28 PROCEDURE — 76819 FETAL BIOPHYS PROFIL W/O NST: CPT

## 2024-01-01 ENCOUNTER — EMERGENCY (EMERGENCY)
Facility: HOSPITAL | Age: 34
LOS: 0 days | Discharge: HOME | End: 2024-01-01
Attending: EMERGENCY MEDICINE
Payer: MEDICAID

## 2024-01-01 VITALS — HEIGHT: 59 IN | WEIGHT: 147.05 LBS

## 2024-01-01 VITALS
RESPIRATION RATE: 16 BRPM | DIASTOLIC BLOOD PRESSURE: 59 MMHG | OXYGEN SATURATION: 99 % | HEART RATE: 98 BPM | SYSTOLIC BLOOD PRESSURE: 101 MMHG | TEMPERATURE: 99 F

## 2024-01-01 DIAGNOSIS — J02.9 ACUTE PHARYNGITIS, UNSPECIFIED: ICD-10-CM

## 2024-01-01 DIAGNOSIS — R07.9 CHEST PAIN, UNSPECIFIED: ICD-10-CM

## 2024-01-01 DIAGNOSIS — U07.1 COVID-19: ICD-10-CM

## 2024-01-01 DIAGNOSIS — O99.413 DISEASES OF THE CIRCULATORY SYSTEM COMPLICATING PREGNANCY, THIRD TRIMESTER: ICD-10-CM

## 2024-01-01 DIAGNOSIS — R51.9 HEADACHE, UNSPECIFIED: ICD-10-CM

## 2024-01-01 DIAGNOSIS — Z3A.34 34 WEEKS GESTATION OF PREGNANCY: ICD-10-CM

## 2024-01-01 DIAGNOSIS — R00.0 TACHYCARDIA, UNSPECIFIED: ICD-10-CM

## 2024-01-01 DIAGNOSIS — I95.9 HYPOTENSION, UNSPECIFIED: ICD-10-CM

## 2024-01-01 DIAGNOSIS — O98.513 OTHER VIRAL DISEASES COMPLICATING PREGNANCY, THIRD TRIMESTER: ICD-10-CM

## 2024-01-01 LAB
ALBUMIN SERPL ELPH-MCNC: 2.4 G/DL — LOW (ref 3.3–5)
ALBUMIN SERPL ELPH-MCNC: 2.4 G/DL — LOW (ref 3.3–5)
ALP SERPL-CCNC: 159 U/L — HIGH (ref 40–120)
ALP SERPL-CCNC: 159 U/L — HIGH (ref 40–120)
ALT FLD-CCNC: 10 U/L — LOW (ref 12–78)
ALT FLD-CCNC: 10 U/L — LOW (ref 12–78)
ANION GAP SERPL CALC-SCNC: 5 MMOL/L — SIGNIFICANT CHANGE UP (ref 5–17)
ANION GAP SERPL CALC-SCNC: 5 MMOL/L — SIGNIFICANT CHANGE UP (ref 5–17)
AST SERPL-CCNC: 13 U/L — LOW (ref 15–37)
AST SERPL-CCNC: 13 U/L — LOW (ref 15–37)
BASOPHILS # BLD AUTO: 0.04 K/UL — SIGNIFICANT CHANGE UP (ref 0–0.2)
BASOPHILS # BLD AUTO: 0.04 K/UL — SIGNIFICANT CHANGE UP (ref 0–0.2)
BASOPHILS NFR BLD AUTO: 0.3 % — SIGNIFICANT CHANGE UP (ref 0–2)
BASOPHILS NFR BLD AUTO: 0.3 % — SIGNIFICANT CHANGE UP (ref 0–2)
BILIRUB SERPL-MCNC: 0.4 MG/DL — SIGNIFICANT CHANGE UP (ref 0.2–1.2)
BILIRUB SERPL-MCNC: 0.4 MG/DL — SIGNIFICANT CHANGE UP (ref 0.2–1.2)
BUN SERPL-MCNC: 7 MG/DL — SIGNIFICANT CHANGE UP (ref 7–23)
BUN SERPL-MCNC: 7 MG/DL — SIGNIFICANT CHANGE UP (ref 7–23)
CALCIUM SERPL-MCNC: 7.9 MG/DL — LOW (ref 8.5–10.1)
CALCIUM SERPL-MCNC: 7.9 MG/DL — LOW (ref 8.5–10.1)
CHLORIDE SERPL-SCNC: 109 MMOL/L — HIGH (ref 96–108)
CHLORIDE SERPL-SCNC: 109 MMOL/L — HIGH (ref 96–108)
CO2 SERPL-SCNC: 23 MMOL/L — SIGNIFICANT CHANGE UP (ref 22–31)
CO2 SERPL-SCNC: 23 MMOL/L — SIGNIFICANT CHANGE UP (ref 22–31)
CREAT SERPL-MCNC: 0.52 MG/DL — SIGNIFICANT CHANGE UP (ref 0.5–1.3)
CREAT SERPL-MCNC: 0.52 MG/DL — SIGNIFICANT CHANGE UP (ref 0.5–1.3)
EGFR: 126 ML/MIN/1.73M2 — SIGNIFICANT CHANGE UP
EGFR: 126 ML/MIN/1.73M2 — SIGNIFICANT CHANGE UP
EOSINOPHIL # BLD AUTO: 0.05 K/UL — SIGNIFICANT CHANGE UP (ref 0–0.5)
EOSINOPHIL # BLD AUTO: 0.05 K/UL — SIGNIFICANT CHANGE UP (ref 0–0.5)
EOSINOPHIL NFR BLD AUTO: 0.4 % — SIGNIFICANT CHANGE UP (ref 0–6)
EOSINOPHIL NFR BLD AUTO: 0.4 % — SIGNIFICANT CHANGE UP (ref 0–6)
FLUAV AG NPH QL: SIGNIFICANT CHANGE UP
FLUAV AG NPH QL: SIGNIFICANT CHANGE UP
FLUBV AG NPH QL: SIGNIFICANT CHANGE UP
FLUBV AG NPH QL: SIGNIFICANT CHANGE UP
GLUCOSE SERPL-MCNC: 125 MG/DL — HIGH (ref 70–99)
GLUCOSE SERPL-MCNC: 125 MG/DL — HIGH (ref 70–99)
HCT VFR BLD CALC: 32.9 % — LOW (ref 34.5–45)
HCT VFR BLD CALC: 32.9 % — LOW (ref 34.5–45)
HGB BLD-MCNC: 11.9 G/DL — SIGNIFICANT CHANGE UP (ref 11.5–15.5)
HGB BLD-MCNC: 11.9 G/DL — SIGNIFICANT CHANGE UP (ref 11.5–15.5)
IMM GRANULOCYTES NFR BLD AUTO: 0.4 % — SIGNIFICANT CHANGE UP (ref 0–0.9)
IMM GRANULOCYTES NFR BLD AUTO: 0.4 % — SIGNIFICANT CHANGE UP (ref 0–0.9)
LYMPHOCYTES # BLD AUTO: 0.74 K/UL — LOW (ref 1–3.3)
LYMPHOCYTES # BLD AUTO: 0.74 K/UL — LOW (ref 1–3.3)
LYMPHOCYTES # BLD AUTO: 6.1 % — LOW (ref 13–44)
LYMPHOCYTES # BLD AUTO: 6.1 % — LOW (ref 13–44)
MCHC RBC-ENTMCNC: 31.8 PG — SIGNIFICANT CHANGE UP (ref 27–34)
MCHC RBC-ENTMCNC: 31.8 PG — SIGNIFICANT CHANGE UP (ref 27–34)
MCHC RBC-ENTMCNC: 36.2 GM/DL — HIGH (ref 32–36)
MCHC RBC-ENTMCNC: 36.2 GM/DL — HIGH (ref 32–36)
MCV RBC AUTO: 88 FL — SIGNIFICANT CHANGE UP (ref 80–100)
MCV RBC AUTO: 88 FL — SIGNIFICANT CHANGE UP (ref 80–100)
MONOCYTES # BLD AUTO: 0.89 K/UL — SIGNIFICANT CHANGE UP (ref 0–0.9)
MONOCYTES # BLD AUTO: 0.89 K/UL — SIGNIFICANT CHANGE UP (ref 0–0.9)
MONOCYTES NFR BLD AUTO: 7.3 % — SIGNIFICANT CHANGE UP (ref 2–14)
MONOCYTES NFR BLD AUTO: 7.3 % — SIGNIFICANT CHANGE UP (ref 2–14)
NEUTROPHILS # BLD AUTO: 10.42 K/UL — HIGH (ref 1.8–7.4)
NEUTROPHILS # BLD AUTO: 10.42 K/UL — HIGH (ref 1.8–7.4)
NEUTROPHILS NFR BLD AUTO: 85.5 % — HIGH (ref 43–77)
NEUTROPHILS NFR BLD AUTO: 85.5 % — HIGH (ref 43–77)
PLATELET # BLD AUTO: 221 K/UL — SIGNIFICANT CHANGE UP (ref 150–400)
PLATELET # BLD AUTO: 221 K/UL — SIGNIFICANT CHANGE UP (ref 150–400)
POTASSIUM SERPL-MCNC: 3.5 MMOL/L — SIGNIFICANT CHANGE UP (ref 3.5–5.3)
POTASSIUM SERPL-MCNC: 3.5 MMOL/L — SIGNIFICANT CHANGE UP (ref 3.5–5.3)
POTASSIUM SERPL-SCNC: 3.5 MMOL/L — SIGNIFICANT CHANGE UP (ref 3.5–5.3)
POTASSIUM SERPL-SCNC: 3.5 MMOL/L — SIGNIFICANT CHANGE UP (ref 3.5–5.3)
PROT SERPL-MCNC: 6.2 GM/DL — SIGNIFICANT CHANGE UP (ref 6–8.3)
PROT SERPL-MCNC: 6.2 GM/DL — SIGNIFICANT CHANGE UP (ref 6–8.3)
RBC # BLD: 3.74 M/UL — LOW (ref 3.8–5.2)
RBC # BLD: 3.74 M/UL — LOW (ref 3.8–5.2)
RBC # FLD: 13.2 % — SIGNIFICANT CHANGE UP (ref 10.3–14.5)
RBC # FLD: 13.2 % — SIGNIFICANT CHANGE UP (ref 10.3–14.5)
RSV RNA NPH QL NAA+NON-PROBE: SIGNIFICANT CHANGE UP
RSV RNA NPH QL NAA+NON-PROBE: SIGNIFICANT CHANGE UP
SARS-COV-2 RNA SPEC QL NAA+PROBE: DETECTED
SARS-COV-2 RNA SPEC QL NAA+PROBE: DETECTED
SODIUM SERPL-SCNC: 137 MMOL/L — SIGNIFICANT CHANGE UP (ref 135–145)
SODIUM SERPL-SCNC: 137 MMOL/L — SIGNIFICANT CHANGE UP (ref 135–145)
WBC # BLD: 12.19 K/UL — HIGH (ref 3.8–10.5)
WBC # BLD: 12.19 K/UL — HIGH (ref 3.8–10.5)
WBC # FLD AUTO: 12.19 K/UL — HIGH (ref 3.8–10.5)
WBC # FLD AUTO: 12.19 K/UL — HIGH (ref 3.8–10.5)

## 2024-01-01 PROCEDURE — 93005 ELECTROCARDIOGRAM TRACING: CPT

## 2024-01-01 PROCEDURE — 93010 ELECTROCARDIOGRAM REPORT: CPT

## 2024-01-01 PROCEDURE — 0241U: CPT

## 2024-01-01 PROCEDURE — 99285 EMERGENCY DEPT VISIT HI MDM: CPT

## 2024-01-01 PROCEDURE — 59025 FETAL NON-STRESS TEST: CPT

## 2024-01-01 PROCEDURE — 36415 COLL VENOUS BLD VENIPUNCTURE: CPT

## 2024-01-01 PROCEDURE — 99283 EMERGENCY DEPT VISIT LOW MDM: CPT

## 2024-01-01 PROCEDURE — 80053 COMPREHEN METABOLIC PANEL: CPT

## 2024-01-01 PROCEDURE — 85025 COMPLETE CBC W/AUTO DIFF WBC: CPT

## 2024-01-01 RX ORDER — ACETAMINOPHEN 500 MG
650 TABLET ORAL ONCE
Refills: 0 | Status: COMPLETED | OUTPATIENT
Start: 2024-01-01 | End: 2024-01-01

## 2024-01-01 RX ORDER — SODIUM CHLORIDE 9 MG/ML
1000 INJECTION INTRAMUSCULAR; INTRAVENOUS; SUBCUTANEOUS ONCE
Refills: 0 | Status: COMPLETED | OUTPATIENT
Start: 2024-01-01 | End: 2024-01-01

## 2024-01-01 RX ORDER — NIRMATRELVIR AND RITONAVIR 150-100 MG
1 KIT ORAL
Qty: 10 | Refills: 0
Start: 2024-01-01 | End: 2024-01-05

## 2024-01-01 RX ADMIN — SODIUM CHLORIDE 1000 MILLILITER(S): 9 INJECTION INTRAMUSCULAR; INTRAVENOUS; SUBCUTANEOUS at 13:42

## 2024-01-01 RX ADMIN — SODIUM CHLORIDE 1000 MILLILITER(S): 9 INJECTION INTRAMUSCULAR; INTRAVENOUS; SUBCUTANEOUS at 12:29

## 2024-01-01 RX ADMIN — Medication 650 MILLIGRAM(S): at 12:30

## 2024-01-01 NOTE — ED ADULT TRIAGE NOTE - CHIEF COMPLAINT QUOTE
pt presents to ED for body aches, HA, throat pain, chest pain. pt is 34 weeks pregnant, 3rd pregnancy. due date 2/8. pt of Dr Helm. L&D aware of pt and to be called with covid/ flu results. will obtain ekg.

## 2024-01-01 NOTE — ED STATDOCS - CLINICAL SUMMARY MEDICAL DECISION MAKING FREE TEXT BOX
In summary this is a 33-year-old female who is 34 weeks gestation who presents emerged part with flulike symptoms.  Vital signs demonstrate a sinus tachycardia on arrival with mild oral temperature to 99.6.  No focal exam findings other than a gravid abdomen at this time.  Lungs are clear on exam, no wheezing, no rhonchi, no rales, do not suspect pneumonia or CHF. No concern for serious bacterial infection, meningitis.  Vital signs not consistent with preeclampsia.  Will obtain flu COVID testing, will give dose of Tylenol and IV fluids at this time.  Will reevaluate.

## 2024-01-01 NOTE — ED ADULT NURSE REASSESSMENT NOTE - GENERAL PATIENT STATE
pt tolerated sandwich, FHR monitoring completed, BP improved s/ p 2LNS IVF/improvement verbalized/family/SO at bedside

## 2024-01-01 NOTE — ED STATDOCS - NSFOLLOWUPINSTRUCTIONS_ED_ALL_ED_FT
Pregnancy and COVID-19  Pregnant women and women who were recently pregnant are at greater risk for severe illness from COVID-19 than women who are not pregnant. Other conditions, such as being pregnant at an older age or having diabetes or obesity, can further increase the risk of severe illness from COVID-19. This increased risk can last for at least 6 weeks (42 days) after a pregnancy ends.    To protect yourself and your baby:  Know your risk factors. Ask your health care provider about your specific risk factors.  Work with your health care team to protect yourself against COVID-19 and other infections.  How does COVID-19 affect me?  If you get COVID-19 during or shortly after your pregnancy, there is a greater risk that:  You may get a respiratory illness that can lead to pneumonia or severe illness. This may lead to a stay in the intensive care unit (ICU), or the use of mechanical ventilation, or death.  You may give birth to your baby before 37 weeks of pregnancy.  You may have other complications that can affect your pregnancy, such as having a stillbirth.  How does COVID-19 affect my care?  If you have COVID-19, special precautions will be taken around your pregnancy.  You will have to tell the clinic or hospital before a visit. Steps will be taken to protect other people from the virus, including treating you in a room intended for people who have COVID-19.  Tests and scans may be done differently before delivery.  Your birth plan may change, including what room you will be in and who may be with you during labor and delivery.  You may stay longer in the hospital after delivery.  Your baby may stay in a different room. Ask about the risks and benefits of staying in the same room with your baby. Benefits include breastfeeding and mother– bonding.  You may need to feed your baby differently.  Visitors will be limited after your baby is born.  How does COVID-19 affect my baby?  It is very rare for a mother with COVID-19 to pass the virus to the unborn baby. However, a baby can get the virus if he or she is exposed to it after birth.    Ask your health care provider about ways to protect your baby. A physical barrier can also be used, such as an incubator.    What can I do to lower my risk?  Medicines and vaccines    You can get a COVID-19 vaccine. This may protect you from severe illness. If you have concerns, talk to your health care provider.  Get other recommended vaccines, including the flu vaccine and whooping cough (Tdap) vaccine. You may get these vaccines at the same time you get the COVID-19 vaccine.  Ask your health care provider if you can get a 30-day, or longer, supply of your medicines so you can make fewer trips to the pharmacy.  If you have received a COVID-19 vaccine, consider enrolling in the v-safe program from the Centers for Disease Control and Prevention (CDC). This program uses an alin on your smartphone to provide check-ins and gather information on your health after you get the vaccine. There is a separate registry for pregnant women. For more information, visit:  V-safe tool: www.cdc.gov/vsafe.html  V-safe pregnancy registry: www.cdc.gov/pregnancyregistry.html  Over time, protection from a vaccine can weaken. Get a booster dose to improve, or boost, your body's ability to protect you from illness. The booster dose depends on which COVID-19 vaccine you received and your risk of getting very sick from the virus.  All people, including pregnant women, should receive an mRNA COVID-19 vaccine booster dose following the completion of their last COVID-19 primary vaccine dose or booster.  The usual booster vaccines are the mRNA vaccines. However, Novavax's COVID-19 vaccine can be used as a booster when a person has not yet received any booster dose, and:  is unable to get an mRNA COVID-19 vaccine, or  is unwilling to get an mRNA COVID-19 vaccine.  Vaccination may occur in any trimester, but a primary vaccine and boosters should be obtained as soon as possible to better protect the mother and the unborn child.  Cleaning and personal hygiene    A person washing hands with soap and water.  If you are pregnant or were recently pregnant, you need to take steps to prevent infection. You should:  Wear a mask when in public or at work. Avoid people who are not wearing a mask.  Limit contact with other people as much as possible.  Wash your hands with soap and water for at least 20 seconds. If soap and water are not available, use alcohol-based hand .  Keep your distance. Stay more than 6 feet (1.8 m) away from others as much as possible. Avoid crowds.  Avoid touching your mouth, face, eyes, or nose before washing your hands.  Clean and disinfect objects and surfaces that are touched often.  Other things to do    Avoid people who might have been exposed to or infected with COVID-19, including people who live with you.  Follow guidelines from health officials about when to wear a mask. The CDC says that all fully vaccinated people should still wear masks in some cases, including:  In certain places, such as in health care settings, schools, airports, and on public transit.  When required by law or by guidelines from businesses or workplaces.  Avoid poorly ventilated places.  Call your health care provider if you have any health concerns.  Follow these instructions:  Managing stress    Some pregnant women and women who were recently pregnant may have fear, uncertainty, and stress because of COVID-19. Find ways to manage stress. These may include:  Using relaxation techniques such as meditation and deep breathing.  Getting regular exercise. Most women can continue their usual exercise routine during pregnancy. Ask your health care provider what activities are safe for you.  Seeking support from family, friends, or spiritual resources. If you cannot be together in person, you can still connect by phone calls, texts, video calls, or online messaging.  Doing activities that you enjoy, such as listening to music or reading a book.  General instructions    Follow your health care provider's instructions on taking medicines. Some medicines may not be safe to take during pregnancy.  Ask for help if you have counseling or nutritional needs. Your health care provider can give advice or refer you to resources or specialists who can help you with different needs.  Keep all follow-up visits. These include visits before and after you have your baby.  Questions to ask your health care team  What should I do if I have COVID-19 symptoms?  What are the side effects of COVID-19 vaccines?  How will COVID-19 affect my care before birth, during labor and delivery, and after delivery?  What are the risks of COVID-19 to me and to my child before and after birth?  How do vaccines pass antibodies to my unborn baby?  Should I plan to breastfeed my baby?  Where can I find mental health help?  Where can I find support if I have financial concerns?  Where to find more information  CDC: www.cdc.gov/pregnant-people.html  World Health Organization (WHO): www.who.int/pregnancy-and-childbirth  American College of Obstetricians and Gynecologists (ACOG): www.acog.org  Contact a health care provider if:  You have signs and symptoms of infection, including a fever or cough. Tell your health care team:  That you think you may have a COVID-19 infection.  That you are pregnant.  You feel very sad or anxious.  You feel unsafe in your home and need help finding a safe place to live.  You are bleeding from your vagina, or you have bloody or watery discharge from your vagina.  Get help right away if:  You have signs or symptoms of labor before 37 weeks of pregnancy. These include:  Contractions that are 5 minutes or less apart, or that become longer, more frequent, or more intense.  Sudden, sharp pain in the abdomen or in the lower back.  A gush or trickle of fluid from your vagina.  You have signs of more serious illness, such as:  Trouble breathing.  Chest pain.  A fever of 102.2°F (39°C) or higher that does not go away.  Vomiting every time you drink fluids.  Feeling extremely weak.  Fainting.  These symptoms may be an emergency. Get help right away. Call 911.  Do not wait to see if the symptoms will go away.  Do not drive yourself to the hospital.  Summary  Pregnant women and women who were recently pregnant are at greater risk for severe illness from COVID-19.  Take precautions to protect yourself and your baby. Wear a mask. Wash hands often. Avoid touching your mouth, face, eyes, or nose before washing hands. Avoid large groups of people and stay away from people who are sick.  If you have COVID-19, precautions may be taken during pregnancy, labor and delivery, and after delivery.  If you think you have a COVID-19 infection, contact your health care provider right away. Tell them about your pregnancy as well.  This information is not intended to replace advice given to you by your health care provider. Make sure you discuss any questions you have with your health care provider. Drink plenty of fluids   Take tylenol 650 mg every 4 -6 hours as needed for pain   Follow up with your OBGYN   Return to the ED if any worsening symptoms.         Pregnancy and COVID-19  Pregnant women and women who were recently pregnant are at greater risk for severe illness from COVID-19 than women who are not pregnant. Other conditions, such as being pregnant at an older age or having diabetes or obesity, can further increase the risk of severe illness from COVID-19. This increased risk can last for at least 6 weeks (42 days) after a pregnancy ends.    To protect yourself and your baby:  Know your risk factors. Ask your health care provider about your specific risk factors.  Work with your health care team to protect yourself against COVID-19 and other infections.  How does COVID-19 affect me?  If you get COVID-19 during or shortly after your pregnancy, there is a greater risk that:  You may get a respiratory illness that can lead to pneumonia or severe illness. This may lead to a stay in the intensive care unit (ICU), or the use of mechanical ventilation, or death.  You may give birth to your baby before 37 weeks of pregnancy.  You may have other complications that can affect your pregnancy, such as having a stillbirth.  How does COVID-19 affect my care?  If you have COVID-19, special precautions will be taken around your pregnancy.  You will have to tell the clinic or hospital before a visit. Steps will be taken to protect other people from the virus, including treating you in a room intended for people who have COVID-19.  Tests and scans may be done differently before delivery.  Your birth plan may change, including what room you will be in and who may be with you during labor and delivery.  You may stay longer in the hospital after delivery.  Your baby may stay in a different room. Ask about the risks and benefits of staying in the same room with your baby. Benefits include breastfeeding and mother– bonding.  You may need to feed your baby differently.  Visitors will be limited after your baby is born.  How does COVID-19 affect my baby?  It is very rare for a mother with COVID-19 to pass the virus to the unborn baby. However, a baby can get the virus if he or she is exposed to it after birth.    Ask your health care provider about ways to protect your baby. A physical barrier can also be used, such as an incubator.    What can I do to lower my risk?  Medicines and vaccines    You can get a COVID-19 vaccine. This may protect you from severe illness. If you have concerns, talk to your health care provider.  Get other recommended vaccines, including the flu vaccine and whooping cough (Tdap) vaccine. You may get these vaccines at the same time you get the COVID-19 vaccine.  Ask your health care provider if you can get a 30-day, or longer, supply of your medicines so you can make fewer trips to the pharmacy.  If you have received a COVID-19 vaccine, consider enrolling in the v-safe program from the Centers for Disease Control and Prevention (CDC). This program uses an alin on your smartphone to provide check-ins and gather information on your health after you get the vaccine. There is a separate registry for pregnant women. For more information, visit:  V-safe tool: www.cdc.gov/vsafe.html  V-safe pregnancy registry: www.cdc.gov/pregnancyregistry.html  Over time, protection from a vaccine can weaken. Get a booster dose to improve, or boost, your body's ability to protect you from illness. The booster dose depends on which COVID-19 vaccine you received and your risk of getting very sick from the virus.  All people, including pregnant women, should receive an mRNA COVID-19 vaccine booster dose following the completion of their last COVID-19 primary vaccine dose or booster.  The usual booster vaccines are the mRNA vaccines. However, Novavax's COVID-19 vaccine can be used as a booster when a person has not yet received any booster dose, and:  is unable to get an mRNA COVID-19 vaccine, or  is unwilling to get an mRNA COVID-19 vaccine.  Vaccination may occur in any trimester, but a primary vaccine and boosters should be obtained as soon as possible to better protect the mother and the unborn child.  Cleaning and personal hygiene    A person washing hands with soap and water.  If you are pregnant or were recently pregnant, you need to take steps to prevent infection. You should:  Wear a mask when in public or at work. Avoid people who are not wearing a mask.  Limit contact with other people as much as possible.  Wash your hands with soap and water for at least 20 seconds. If soap and water are not available, use alcohol-based hand .  Keep your distance. Stay more than 6 feet (1.8 m) away from others as much as possible. Avoid crowds.  Avoid touching your mouth, face, eyes, or nose before washing your hands.  Clean and disinfect objects and surfaces that are touched often.  Other things to do    Avoid people who might have been exposed to or infected with COVID-19, including people who live with you.  Follow guidelines from health officials about when to wear a mask. The CDC says that all fully vaccinated people should still wear masks in some cases, including:  In certain places, such as in health care settings, schools, airports, and on public transit.  When required by law or by guidelines from businesses or workplaces.  Avoid poorly ventilated places.  Call your health care provider if you have any health concerns.  Follow these instructions:  Managing stress    Some pregnant women and women who were recently pregnant may have fear, uncertainty, and stress because of COVID-19. Find ways to manage stress. These may include:  Using relaxation techniques such as meditation and deep breathing.  Getting regular exercise. Most women can continue their usual exercise routine during pregnancy. Ask your health care provider what activities are safe for you.  Seeking support from family, friends, or spiritual resources. If you cannot be together in person, you can still connect by phone calls, texts, video calls, or online messaging.  Doing activities that you enjoy, such as listening to music or reading a book.  General instructions    Follow your health care provider's instructions on taking medicines. Some medicines may not be safe to take during pregnancy.  Ask for help if you have counseling or nutritional needs. Your health care provider can give advice or refer you to resources or specialists who can help you with different needs.  Keep all follow-up visits. These include visits before and after you have your baby.  Questions to ask your health care team  What should I do if I have COVID-19 symptoms?  What are the side effects of COVID-19 vaccines?  How will COVID-19 affect my care before birth, during labor and delivery, and after delivery?  What are the risks of COVID-19 to me and to my child before and after birth?  How do vaccines pass antibodies to my unborn baby?  Should I plan to breastfeed my baby?  Where can I find mental health help?  Where can I find support if I have financial concerns?  Where to find more information  CDC: www.cdc.gov/pregnant-people.html  World Health Organization (WHO): www.who.int/pregnancy-and-childbirth  American College of Obstetricians and Gynecologists (ACOG): www.acog.org  Contact a health care provider if:  You have signs and symptoms of infection, including a fever or cough. Tell your health care team:  That you think you may have a COVID-19 infection.  That you are pregnant.  You feel very sad or anxious.  You feel unsafe in your home and need help finding a safe place to live.  You are bleeding from your vagina, or you have bloody or watery discharge from your vagina.  Get help right away if:  You have signs or symptoms of labor before 37 weeks of pregnancy. These include:  Contractions that are 5 minutes or less apart, or that become longer, more frequent, or more intense.  Sudden, sharp pain in the abdomen or in the lower back.  A gush or trickle of fluid from your vagina.  You have signs of more serious illness, such as:  Trouble breathing.  Chest pain.  A fever of 102.2°F (39°C) or higher that does not go away.  Vomiting every time you drink fluids.  Feeling extremely weak.  Fainting.  These symptoms may be an emergency. Get help right away. Call 911.  Do not wait to see if the symptoms will go away.  Do not drive yourself to the hospital.  Summary  Pregnant women and women who were recently pregnant are at greater risk for severe illness from COVID-19.  Take precautions to protect yourself and your baby. Wear a mask. Wash hands often. Avoid touching your mouth, face, eyes, or nose before washing hands. Avoid large groups of people and stay away from people who are sick.  If you have COVID-19, precautions may be taken during pregnancy, labor and delivery, and after delivery.  If you think you have a COVID-19 infection, contact your health care provider right away. Tell them about your pregnancy as well.  This information is not intended to replace advice given to you by your health care provider. Make sure you discuss any questions you have with your health care provider.

## 2024-01-01 NOTE — ED STATDOCS - PROGRESS NOTE DETAILS
SHANIA Green: + covid. HR improved, pt slight hypotensive. 2nd liter ordered and will re eval. Pt reports headache initially started about 2 -3 days ago. Offered paxlovid and pt reports she will follow up with OBGYN tomorrow to discuss. SHANIA Green: I participated in the care of this patient. I agree with the history, physical and plan. SHANIA Green: 2nd liter of fluids given. vs repeated. Pt stable for dc and to follow up with pmd/ OBGYN. SHANIA Green: 2nd liter of fluids given. vs repeated. OBGYN seen her at bedside, and will prescribed paxlovid at this time. Pt agrees with plan.

## 2024-01-01 NOTE — ED ADULT TRIAGE NOTE - WEIGHT IN LBS
Sacroiliac Joint Injection, Care After  This sheet gives you information about how to care for yourself after your procedure. Your health care provider may also give you more specific instructions. If you have problems or questions, contact your health care provider.  What can I expect after the procedure?  After the procedure, it is common to have bruising or soreness at the injection site.  Follow these instructions at home:  Managing pain and swelling         Keep a record of your pain (a pain log) to share with your health care provider at your follow-up visit. If a long-acting anti-inflammatory medicine (steroid) was included in your injection, you may not notice an improvement in your pain level for a few days.  Do not use a heating pad, and do not apply heat directly to the area after the procedure for 24-48 hours  If directed, put ice on the affected area. To do this:  Put ice in a plastic bag.  Place a towel between your skin and the bag.  Leave the ice on for 20 minutes, 2-3 times a day.  Remove the ice if your skin turns bright red. This is very important. If you cannot feel pain, heat, or cold, you have a greater risk of damage to the area.  Injection site care  Check your injection site every day for signs of infection. Check for:  Redness, swelling, or pain.  Fluid or blood.  Warmth.  Pus or a bad smell.  Do not take baths, swim, or use a hot tub until your health care provider says that it is safe. You may take showers.  Activity  Rest on the day of your procedure. Avoid doing a lot of activity on that day.  Avoid any activities that take a lot of effort for 24 hours after the injection.  Return to your normal activities the following day or as instructed by your health care provider. Ask your health care provider what activities are safe for you.  General instructions  Take over-the-counter and prescription medicines only as told by your health care provider.  Since dye was used during your  procedure, drink plenty of water to flush the dye out of your body.  No driving for 24 hours after your procedure if directed by your provider  If you are a Diabetic, monitor your blood sugar for the next 48 hours.  If your blood sugar is above 250, contact the physician that helps you monitor your blood sugar.  Keep all follow-up visits. This is important.  Contact a health care provider if:  Your pain does not improve or it gets worse.  You have numbness, tingling, or weakness.  You have a fever or chills.  You have redness, swelling, or pain around your injection site.  You have fluid or blood coming from your injection site.  Your injection site feels warm to the touch.  You have pus or a bad smell coming from your injection site.  Get help right away if:  You have chest pain or shortness of breath.  These symptoms may represent a serious problem that is an emergency. Do not wait to see if the symptoms will go away. Get medical help right away. Call your local emergency services (911 in the [place] Do not drive yourself to the hospital.  Summary  After the procedure, it is common to have bruising or soreness at the injection site.  Keep a record of your pain (a pain log) to share with your health care provider at your follow-up visit.  Return to your normal activities as told by your health care provider. Ask your health care provider what activities are safe for you.  Contact your health care provider if you have pain that is getting worse, weakness, numbness, or any sign of infection at your injection site.  This information is not intended to replace advice given to you by your health care provider. Make sure you discuss any questions you have with your health care provider.  Document Revised: 04/29/2021 Document Reviewed: 04/29/2021  [employer]vier Patient Education c 2021 [employer]        147

## 2024-01-01 NOTE — ED STATDOCS - PATIENT PORTAL LINK FT
You can access the FollowMyHealth Patient Portal offered by [facility] by registering at the following website: [email or web address]. By joining [facility]’s FollowMyHealth portal, you will also be able to view your health information using other applications (apps) compatible with our system. You can access the FollowMyHealth Patient Portal offered by [facility] by registering at the following website: [email or web address]. By joining [employer]’s FollowMyHealth portal, you will also be able to view your health information using other applications (apps) compatible with our system.

## 2024-01-01 NOTE — ED STATDOCS - EKG ADDITIONAL INFORMATION FREE TEXT
EKG performed and independently interpreted by myself to be sinus tachycardia, rate 144, , QRS 72, QTc 535.

## 2024-01-01 NOTE — ED ADULT NURSE NOTE - NS ED NURSE RECORD ANOTHER VITAL SIGN
Comments:     Last Office Visit (last PCP visit):   6/3/2022    Next Visit Date:  Future Appointments   Date Time Provider Samy Fox   6/10/2022  4:00 PM SCHEDULE, DARA ALMANZAR [facility]       **If hasn't been seen in over a year OR hasn't followed up according to last diabetes/ADHD visit, make appointment for patient before sending refill to provider.     Rx requested:  Requested Prescriptions     Pending Prescriptions Disp Refills    aluminum chloride (DRYSOL) 20 % external solution [Pharmacy Med Name: Drysol Dab-O-Matic 20 % topical solution] 35 mL 2     Sig: Apply topically nightly until sweat decreases then use once weekly    melatonin 10 MG CAPS capsule [Pharmacy Med Name: melatonin 10 mg capsule] 30 capsule 5     Sig: TAKE 1 CAPSULE BY MOUTH NIGHTLY AS NEEDED FOR INSOMNIA Yes

## 2024-01-01 NOTE — ED STATDOCS - OBJECTIVE STATEMENT
34 y/o female 34 weeks pregnant  presents to the ED c/o body aches, headache, sore throat since today. Endorses cp when coughing, covid contact. Denies abdominal pain, vaginal discharge. 32 y/o female 34 weeks pregnant  presents to the ED c/o body aches, headache, sore throat since today. Endorses cp when coughing, covid contact. Denies abdominal pain, vaginal discharge.

## 2024-01-01 NOTE — ED ADULT NURSE NOTE - NSFALLUNIVINTERV_ED_ALL_ED
Bed/Stretcher in lowest position, wheels locked, appropriate side rails in place/Call bell, personal items and telephone in reach/Instruct patient to call for assistance before getting out of bed/chair/stretcher/Non-slip footwear applied when patient is off stretcher/[place] to call system/Physically safe environment - no spills, clutter or unnecessary equipment/Purposeful proactive rounding/Room/bathroom lighting operational, light cord in reach Bed/Stretcher in lowest position, wheels locked, appropriate side rails in place/Call bell, personal items and telephone in reach/Instruct patient to call for assistance before getting out of bed/chair/stretcher/Non-slip footwear applied when patient is off stretcher/[place] to call system/Physically safe environment - no spills, clutter or unnecessary equipment/Purposeful proactive rounding/Room/bathroom lighting operational, light cord in reach

## 2024-01-02 ENCOUNTER — NON-APPOINTMENT (OUTPATIENT)
Age: 34
End: 2024-01-02

## 2024-01-03 ENCOUNTER — APPOINTMENT (OUTPATIENT)
Dept: OBGYN | Facility: CLINIC | Age: 34
End: 2024-01-03

## 2024-01-04 ENCOUNTER — APPOINTMENT (OUTPATIENT)
Dept: ANTEPARTUM | Facility: CLINIC | Age: 34
End: 2024-01-04

## 2024-01-08 ENCOUNTER — APPOINTMENT (OUTPATIENT)
Dept: OBGYN | Facility: CLINIC | Age: 34
End: 2024-01-08
Payer: MEDICAID

## 2024-01-08 DIAGNOSIS — M32.9 SYSTEMIC LUPUS ERYTHEMATOSUS, UNSPECIFIED: ICD-10-CM

## 2024-01-08 PROCEDURE — 59025 FETAL NON-STRESS TEST: CPT

## 2024-01-08 PROCEDURE — 99212 OFFICE O/P EST SF 10 MIN: CPT | Mod: TH,25

## 2024-01-10 ENCOUNTER — ASOB RESULT (OUTPATIENT)
Age: 34
End: 2024-01-10

## 2024-01-10 ENCOUNTER — APPOINTMENT (OUTPATIENT)
Dept: MATERNAL FETAL MEDICINE | Facility: CLINIC | Age: 34
End: 2024-01-10
Payer: MEDICAID

## 2024-01-10 ENCOUNTER — APPOINTMENT (OUTPATIENT)
Dept: ANTEPARTUM | Facility: CLINIC | Age: 34
End: 2024-01-10

## 2024-01-10 ENCOUNTER — APPOINTMENT (OUTPATIENT)
Dept: ANTEPARTUM | Facility: CLINIC | Age: 34
End: 2024-01-10
Payer: MEDICAID

## 2024-01-10 VITALS
DIASTOLIC BLOOD PRESSURE: 58 MMHG | BODY MASS INDEX: 29.64 KG/M2 | WEIGHT: 147 LBS | RESPIRATION RATE: 16 BRPM | HEIGHT: 59 IN | SYSTOLIC BLOOD PRESSURE: 100 MMHG | HEART RATE: 91 BPM | OXYGEN SATURATION: 97 %

## 2024-01-10 DIAGNOSIS — O99.810 ABNORMAL GLUCOSE COMPLICATING PREGNANCY: ICD-10-CM

## 2024-01-10 DIAGNOSIS — D68.61 OTHER DISEASES OF THE BLOOD AND BLOOD-FORMING ORGANS AND CERTAIN DISORDERS INVOLVING THE IMMUNE MECHANISM COMPLICATING PREGNANCY, UNSPECIFIED TRIMESTER: ICD-10-CM

## 2024-01-10 DIAGNOSIS — O09.893 SUPERVISION OF OTHER HIGH RISK PREGNANCIES, THIRD TRIMESTER: ICD-10-CM

## 2024-01-10 DIAGNOSIS — O99.119 OTHER DISEASES OF THE BLOOD AND BLOOD-FORMING ORGANS AND CERTAIN DISORDERS INVOLVING THE IMMUNE MECHANISM COMPLICATING PREGNANCY, UNSPECIFIED TRIMESTER: ICD-10-CM

## 2024-01-10 DIAGNOSIS — O36.5990 MATERNAL CARE FOR OTHER KNOWN OR SUSPECTED POOR FETAL GROWTH, UNSPECIFIED TRIMESTER, NOT APPLICABLE OR UNSPECIFIED: ICD-10-CM

## 2024-01-10 PROCEDURE — 76820 UMBILICAL ARTERY ECHO: CPT | Mod: 59

## 2024-01-10 PROCEDURE — 76816 OB US FOLLOW-UP PER FETUS: CPT

## 2024-01-10 PROCEDURE — 76818 FETAL BIOPHYS PROFILE W/NST: CPT

## 2024-01-10 PROCEDURE — 99214 OFFICE O/P EST MOD 30 MIN: CPT | Mod: TH

## 2024-01-10 RX ORDER — NEEDLES, SAFETY 22GX1 1/2"
25G X 5/8" NEEDLE, DISPOSABLE MISCELLANEOUS
Qty: 60 | Refills: 1 | Status: ACTIVE | COMMUNITY
Start: 2024-01-10 | End: 1900-01-01

## 2024-01-10 RX ORDER — HEPARIN SODIUM 5000 [USP'U]/ML
5000 INJECTION, SOLUTION INTRAVENOUS; SUBCUTANEOUS
Qty: 2 | Refills: 1 | Status: DISCONTINUED | COMMUNITY
Start: 2024-01-08 | End: 2024-01-10

## 2024-01-10 RX ORDER — HEPARIN SODIUM 10000 [USP'U]/ML
10000 INJECTION, SOLUTION INTRAVENOUS; SUBCUTANEOUS TWICE DAILY
Qty: 3 | Refills: 1 | Status: ACTIVE | COMMUNITY
Start: 2024-01-10 | End: 1900-01-01

## 2024-01-10 NOTE — CHIEF COMPLAINT
[G ___] : G [unfilled] [P ___] : P [unfilled] [de-identified] : Fetal growth restriction, Antiphospholipid Antibody Syndrome, Maternal Ventricular Septal Defect, History of Arterial Occlusion of Right Ulnar Artery, History of  Delivery

## 2024-01-10 NOTE — DISCUSSION/SUMMARY
[FreeTextEntry1] : We had the pleasure of seeing your patient for a Maternal-Fetal Medicine consultation today. 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 She is followed by rheumatology at [facility]. Continue Plaquenil. Weekly fetal testing. Discontinued Lovenox. Heparin 10,000 twice daily is recommended based on ACOG/SMI guidelines for outpatient prophylactic anticoagulation in the third trimester of pregnancy--discussed dosing with patient extensively. Delivery recommended at 38-39 weeks due to fetal growth restriction. Hold heparin dose in evening prior to delivery.   Thank you for requesting a consultation on this patient. The total time spent in preparation for this visit, medical history taking, orders, review of records, counseling the patient, and writing this note was 30 minutes.  At the end of our discussion, the patient indicated that her questions were answered and she seemed satisfied with our discussion. Please do not hesitate to contact us with any questions.  Sincerely,   Enrico Alvarenga MD, SAKINA Attending Physician, Maternal-Fetal Medicine

## 2024-01-12 ENCOUNTER — NON-APPOINTMENT (OUTPATIENT)
Age: 34
End: 2024-01-12

## 2024-01-15 ENCOUNTER — RESULT REVIEW (OUTPATIENT)
Age: 34
End: 2024-01-15

## 2024-01-15 ENCOUNTER — INPATIENT (INPATIENT)
Facility: HOSPITAL | Age: 34
LOS: 1 days | Discharge: HOME | DRG: 540 | End: 2024-01-17
Attending: OBSTETRICS & GYNECOLOGY | Admitting: OBSTETRICS & GYNECOLOGY
Payer: MEDICAID

## 2024-01-15 VITALS
DIASTOLIC BLOOD PRESSURE: 81 MMHG | TEMPERATURE: 98 F | SYSTOLIC BLOOD PRESSURE: 110 MMHG | OXYGEN SATURATION: 99 % | HEART RATE: 77 BPM | RESPIRATION RATE: 18 BRPM

## 2024-01-15 DIAGNOSIS — O26.899 OTHER SPECIFIED PREGNANCY RELATED CONDITIONS, UNSPECIFIED TRIMESTER: ICD-10-CM

## 2024-01-15 LAB
ALBUMIN SERPL ELPH-MCNC: 2.6 G/DL — LOW (ref 3.3–5)
ALBUMIN SERPL ELPH-MCNC: 2.6 G/DL — LOW (ref 3.3–5)
ALP SERPL-CCNC: 200 U/L — HIGH (ref 40–120)
ALP SERPL-CCNC: 200 U/L — HIGH (ref 40–120)
ALT FLD-CCNC: 15 U/L — SIGNIFICANT CHANGE UP (ref 12–78)
ALT FLD-CCNC: 15 U/L — SIGNIFICANT CHANGE UP (ref 12–78)
ANION GAP SERPL CALC-SCNC: 4 MMOL/L — LOW (ref 5–17)
ANION GAP SERPL CALC-SCNC: 4 MMOL/L — LOW (ref 5–17)
APTT BLD: 39.3 SEC — HIGH (ref 24.5–35.6)
APTT BLD: 39.3 SEC — HIGH (ref 24.5–35.6)
AST SERPL-CCNC: 10 U/L — LOW (ref 15–37)
AST SERPL-CCNC: 10 U/L — LOW (ref 15–37)
BASOPHILS # BLD AUTO: 0.03 K/UL — SIGNIFICANT CHANGE UP (ref 0–0.2)
BASOPHILS # BLD AUTO: 0.03 K/UL — SIGNIFICANT CHANGE UP (ref 0–0.2)
BASOPHILS NFR BLD AUTO: 0.3 % — SIGNIFICANT CHANGE UP (ref 0–2)
BASOPHILS NFR BLD AUTO: 0.3 % — SIGNIFICANT CHANGE UP (ref 0–2)
BILIRUB SERPL-MCNC: 0.3 MG/DL — SIGNIFICANT CHANGE UP (ref 0.2–1.2)
BILIRUB SERPL-MCNC: 0.3 MG/DL — SIGNIFICANT CHANGE UP (ref 0.2–1.2)
BUN SERPL-MCNC: 6 MG/DL — LOW (ref 7–23)
BUN SERPL-MCNC: 6 MG/DL — LOW (ref 7–23)
CALCIUM SERPL-MCNC: 8.6 MG/DL — SIGNIFICANT CHANGE UP (ref 8.5–10.1)
CALCIUM SERPL-MCNC: 8.6 MG/DL — SIGNIFICANT CHANGE UP (ref 8.5–10.1)
CHLORIDE SERPL-SCNC: 110 MMOL/L — HIGH (ref 96–108)
CHLORIDE SERPL-SCNC: 110 MMOL/L — HIGH (ref 96–108)
CO2 SERPL-SCNC: 23 MMOL/L — SIGNIFICANT CHANGE UP (ref 22–31)
CO2 SERPL-SCNC: 23 MMOL/L — SIGNIFICANT CHANGE UP (ref 22–31)
CREAT SERPL-MCNC: 0.56 MG/DL — SIGNIFICANT CHANGE UP (ref 0.5–1.3)
CREAT SERPL-MCNC: 0.56 MG/DL — SIGNIFICANT CHANGE UP (ref 0.5–1.3)
EGFR: 124 ML/MIN/1.73M2 — SIGNIFICANT CHANGE UP
EGFR: 124 ML/MIN/1.73M2 — SIGNIFICANT CHANGE UP
EOSINOPHIL # BLD AUTO: 0.03 K/UL — SIGNIFICANT CHANGE UP (ref 0–0.5)
EOSINOPHIL # BLD AUTO: 0.03 K/UL — SIGNIFICANT CHANGE UP (ref 0–0.5)
EOSINOPHIL NFR BLD AUTO: 0.3 % — SIGNIFICANT CHANGE UP (ref 0–6)
EOSINOPHIL NFR BLD AUTO: 0.3 % — SIGNIFICANT CHANGE UP (ref 0–6)
FIBRINOGEN PPP-MCNC: 505 MG/DL — HIGH (ref 200–435)
FIBRINOGEN PPP-MCNC: 505 MG/DL — HIGH (ref 200–435)
GLUCOSE SERPL-MCNC: 82 MG/DL — SIGNIFICANT CHANGE UP (ref 70–99)
GLUCOSE SERPL-MCNC: 82 MG/DL — SIGNIFICANT CHANGE UP (ref 70–99)
HCT VFR BLD CALC: 35 % — SIGNIFICANT CHANGE UP (ref 34.5–45)
HCT VFR BLD CALC: 35 % — SIGNIFICANT CHANGE UP (ref 34.5–45)
HGB BLD-MCNC: 12.7 G/DL — SIGNIFICANT CHANGE UP (ref 11.5–15.5)
HGB BLD-MCNC: 12.7 G/DL — SIGNIFICANT CHANGE UP (ref 11.5–15.5)
IMM GRANULOCYTES NFR BLD AUTO: 0.4 % — SIGNIFICANT CHANGE UP (ref 0–0.9)
IMM GRANULOCYTES NFR BLD AUTO: 0.4 % — SIGNIFICANT CHANGE UP (ref 0–0.9)
INR BLD: 0.99 RATIO — SIGNIFICANT CHANGE UP (ref 0.85–1.18)
INR BLD: 0.99 RATIO — SIGNIFICANT CHANGE UP (ref 0.85–1.18)
LDH SERPL L TO P-CCNC: 195 U/L — SIGNIFICANT CHANGE UP (ref 84–241)
LDH SERPL L TO P-CCNC: 195 U/L — SIGNIFICANT CHANGE UP (ref 84–241)
LYMPHOCYTES # BLD AUTO: 1.76 K/UL — SIGNIFICANT CHANGE UP (ref 1–3.3)
LYMPHOCYTES # BLD AUTO: 1.76 K/UL — SIGNIFICANT CHANGE UP (ref 1–3.3)
LYMPHOCYTES # BLD AUTO: 17 % — SIGNIFICANT CHANGE UP (ref 13–44)
LYMPHOCYTES # BLD AUTO: 17 % — SIGNIFICANT CHANGE UP (ref 13–44)
MCHC RBC-ENTMCNC: 31.5 PG — SIGNIFICANT CHANGE UP (ref 27–34)
MCHC RBC-ENTMCNC: 31.5 PG — SIGNIFICANT CHANGE UP (ref 27–34)
MCHC RBC-ENTMCNC: 36.3 GM/DL — HIGH (ref 32–36)
MCHC RBC-ENTMCNC: 36.3 GM/DL — HIGH (ref 32–36)
MCV RBC AUTO: 86.8 FL — SIGNIFICANT CHANGE UP (ref 80–100)
MCV RBC AUTO: 86.8 FL — SIGNIFICANT CHANGE UP (ref 80–100)
MONOCYTES # BLD AUTO: 0.75 K/UL — SIGNIFICANT CHANGE UP (ref 0–0.9)
MONOCYTES # BLD AUTO: 0.75 K/UL — SIGNIFICANT CHANGE UP (ref 0–0.9)
MONOCYTES NFR BLD AUTO: 7.3 % — SIGNIFICANT CHANGE UP (ref 2–14)
MONOCYTES NFR BLD AUTO: 7.3 % — SIGNIFICANT CHANGE UP (ref 2–14)
NEUTROPHILS # BLD AUTO: 7.72 K/UL — HIGH (ref 1.8–7.4)
NEUTROPHILS # BLD AUTO: 7.72 K/UL — HIGH (ref 1.8–7.4)
NEUTROPHILS NFR BLD AUTO: 74.7 % — SIGNIFICANT CHANGE UP (ref 43–77)
NEUTROPHILS NFR BLD AUTO: 74.7 % — SIGNIFICANT CHANGE UP (ref 43–77)
PLATELET # BLD AUTO: 285 K/UL — SIGNIFICANT CHANGE UP (ref 150–400)
PLATELET # BLD AUTO: 285 K/UL — SIGNIFICANT CHANGE UP (ref 150–400)
POTASSIUM SERPL-MCNC: 3.8 MMOL/L — SIGNIFICANT CHANGE UP (ref 3.5–5.3)
POTASSIUM SERPL-MCNC: 3.8 MMOL/L — SIGNIFICANT CHANGE UP (ref 3.5–5.3)
POTASSIUM SERPL-SCNC: 3.8 MMOL/L — SIGNIFICANT CHANGE UP (ref 3.5–5.3)
POTASSIUM SERPL-SCNC: 3.8 MMOL/L — SIGNIFICANT CHANGE UP (ref 3.5–5.3)
PROT SERPL-MCNC: 6.6 GM/DL — SIGNIFICANT CHANGE UP (ref 6–8.3)
PROT SERPL-MCNC: 6.6 GM/DL — SIGNIFICANT CHANGE UP (ref 6–8.3)
PROTHROM AB SERPL-ACNC: 11.2 SEC — SIGNIFICANT CHANGE UP (ref 9.5–13)
PROTHROM AB SERPL-ACNC: 11.2 SEC — SIGNIFICANT CHANGE UP (ref 9.5–13)
RBC # BLD: 4.03 M/UL — SIGNIFICANT CHANGE UP (ref 3.8–5.2)
RBC # BLD: 4.03 M/UL — SIGNIFICANT CHANGE UP (ref 3.8–5.2)
RBC # FLD: 13.2 % — SIGNIFICANT CHANGE UP (ref 10.3–14.5)
RBC # FLD: 13.2 % — SIGNIFICANT CHANGE UP (ref 10.3–14.5)
SODIUM SERPL-SCNC: 137 MMOL/L — SIGNIFICANT CHANGE UP (ref 135–145)
SODIUM SERPL-SCNC: 137 MMOL/L — SIGNIFICANT CHANGE UP (ref 135–145)
URATE SERPL-MCNC: 4.5 MG/DL — SIGNIFICANT CHANGE UP (ref 2.5–7)
URATE SERPL-MCNC: 4.5 MG/DL — SIGNIFICANT CHANGE UP (ref 2.5–7)
WBC # BLD: 10.33 K/UL — SIGNIFICANT CHANGE UP (ref 3.8–10.5)
WBC # BLD: 10.33 K/UL — SIGNIFICANT CHANGE UP (ref 3.8–10.5)
WBC # FLD AUTO: 10.33 K/UL — SIGNIFICANT CHANGE UP (ref 3.8–10.5)
WBC # FLD AUTO: 10.33 K/UL — SIGNIFICANT CHANGE UP (ref 3.8–10.5)

## 2024-01-15 PROCEDURE — 80053 COMPREHEN METABOLIC PANEL: CPT

## 2024-01-15 PROCEDURE — 86901 BLOOD TYPING SEROLOGIC RH(D): CPT

## 2024-01-15 PROCEDURE — 85384 FIBRINOGEN ACTIVITY: CPT

## 2024-01-15 PROCEDURE — 85610 PROTHROMBIN TIME: CPT

## 2024-01-15 PROCEDURE — 85025 COMPLETE CBC W/AUTO DIFF WBC: CPT

## 2024-01-15 PROCEDURE — 86900 BLOOD TYPING SEROLOGIC ABO: CPT

## 2024-01-15 PROCEDURE — 84550 ASSAY OF BLOOD/URIC ACID: CPT

## 2024-01-15 PROCEDURE — 86780 TREPONEMA PALLIDUM: CPT

## 2024-01-15 PROCEDURE — 83615 LACTATE (LD) (LDH) ENZYME: CPT

## 2024-01-15 PROCEDURE — 85730 THROMBOPLASTIN TIME PARTIAL: CPT

## 2024-01-15 PROCEDURE — 59514 CESAREAN DELIVERY ONLY: CPT | Mod: U9

## 2024-01-15 PROCEDURE — 88307 TISSUE EXAM BY PATHOLOGIST: CPT | Mod: 26

## 2024-01-15 PROCEDURE — 88307 TISSUE EXAM BY PATHOLOGIST: CPT

## 2024-01-15 PROCEDURE — 36415 COLL VENOUS BLD VENIPUNCTURE: CPT

## 2024-01-15 PROCEDURE — 86850 RBC ANTIBODY SCREEN: CPT

## 2024-01-15 RX ORDER — DIPHENHYDRAMINE HCL 50 MG
25 CAPSULE ORAL EVERY 6 HOURS
Refills: 0 | Status: DISCONTINUED | OUTPATIENT
Start: 2024-01-15 | End: 2024-01-17

## 2024-01-15 RX ORDER — ENOXAPARIN SODIUM 100 MG/ML
40 INJECTION SUBCUTANEOUS EVERY 24 HOURS
Refills: 0 | Status: DISCONTINUED | OUTPATIENT
Start: 2024-01-15 | End: 2024-01-17

## 2024-01-15 RX ORDER — MAGNESIUM HYDROXIDE 400 MG/1
30 TABLET, CHEWABLE ORAL
Refills: 0 | Status: DISCONTINUED | OUTPATIENT
Start: 2024-01-15 | End: 2024-01-17

## 2024-01-15 RX ORDER — LANOLIN
1 OINTMENT (GRAM) TOPICAL EVERY 6 HOURS
Refills: 0 | Status: DISCONTINUED | OUTPATIENT
Start: 2024-01-15 | End: 2024-01-17

## 2024-01-15 RX ORDER — KETOROLAC TROMETHAMINE 30 MG/ML
30 SYRINGE (ML) INJECTION EVERY 6 HOURS
Refills: 0 | Status: DISCONTINUED | OUTPATIENT
Start: 2024-01-15 | End: 2024-01-16

## 2024-01-15 RX ORDER — CITRIC ACID/SODIUM CITRATE 300-500 MG
30 SOLUTION, ORAL ORAL ONCE
Refills: 0 | Status: DISCONTINUED | OUTPATIENT
Start: 2024-01-15 | End: 2024-01-15

## 2024-01-15 RX ORDER — NALOXONE HYDROCHLORIDE 4 MG/.1ML
0.1 SPRAY NASAL
Refills: 0 | Status: DISCONTINUED | OUTPATIENT
Start: 2024-01-15 | End: 2024-01-16

## 2024-01-15 RX ORDER — IBUPROFEN 200 MG
600 TABLET ORAL EVERY 6 HOURS
Refills: 0 | Status: COMPLETED | OUTPATIENT
Start: 2024-01-15 | End: 2024-12-13

## 2024-01-15 RX ORDER — ACETAMINOPHEN 500 MG
1000 TABLET ORAL ONCE
Refills: 0 | Status: COMPLETED | OUTPATIENT
Start: 2024-01-15 | End: 2024-01-15

## 2024-01-15 RX ORDER — TETANUS TOXOID, REDUCED DIPHTHERIA TOXOID AND ACELLULAR PERTUSSIS VACCINE, ADSORBED 5; 2.5; 8; 8; 2.5 [IU]/.5ML; [IU]/.5ML; UG/.5ML; UG/.5ML; UG/.5ML
0.5 SUSPENSION INTRAMUSCULAR ONCE
Refills: 0 | Status: DISCONTINUED | OUTPATIENT
Start: 2024-01-15 | End: 2024-01-17

## 2024-01-15 RX ORDER — SIMETHICONE 80 MG/1
80 TABLET, CHEWABLE ORAL EVERY 4 HOURS
Refills: 0 | Status: DISCONTINUED | OUTPATIENT
Start: 2024-01-15 | End: 2024-01-17

## 2024-01-15 RX ORDER — OXYCODONE HYDROCHLORIDE 5 MG/1
5 TABLET ORAL
Refills: 0 | Status: COMPLETED | OUTPATIENT
Start: 2024-01-15 | End: 2024-01-22

## 2024-01-15 RX ORDER — OXYCODONE HYDROCHLORIDE 5 MG/1
5 TABLET ORAL ONCE
Refills: 0 | Status: DISCONTINUED | OUTPATIENT
Start: 2024-01-15 | End: 2024-01-17

## 2024-01-15 RX ORDER — ACETAMINOPHEN 500 MG
1000 TABLET ORAL ONCE
Refills: 0 | Status: DISCONTINUED | OUTPATIENT
Start: 2024-01-15 | End: 2024-01-15

## 2024-01-15 RX ORDER — CHLORHEXIDINE GLUCONATE 213 G/1000ML
1 SOLUTION TOPICAL DAILY
Refills: 0 | Status: DISCONTINUED | OUTPATIENT
Start: 2024-01-15 | End: 2024-01-15

## 2024-01-15 RX ORDER — ACETAMINOPHEN 500 MG
975 TABLET ORAL
Refills: 0 | Status: DISCONTINUED | OUTPATIENT
Start: 2024-01-15 | End: 2024-01-17

## 2024-01-15 RX ORDER — HYDROMORPHONE HYDROCHLORIDE 2 MG/ML
30 INJECTION INTRAMUSCULAR; INTRAVENOUS; SUBCUTANEOUS
Refills: 0 | Status: DISCONTINUED | OUTPATIENT
Start: 2024-01-15 | End: 2024-01-16

## 2024-01-15 RX ORDER — HYDROXYCHLOROQUINE SULFATE 200 MG
200 TABLET ORAL
Refills: 0 | Status: DISCONTINUED | OUTPATIENT
Start: 2024-01-15 | End: 2024-01-17

## 2024-01-15 RX ORDER — SODIUM CHLORIDE 9 MG/ML
1000 INJECTION, SOLUTION INTRAVENOUS
Refills: 0 | Status: DISCONTINUED | OUTPATIENT
Start: 2024-01-15 | End: 2024-01-15

## 2024-01-15 RX ORDER — HYDROMORPHONE HYDROCHLORIDE 2 MG/ML
0.5 INJECTION INTRAMUSCULAR; INTRAVENOUS; SUBCUTANEOUS
Refills: 0 | Status: DISCONTINUED | OUTPATIENT
Start: 2024-01-15 | End: 2024-01-16

## 2024-01-15 RX ORDER — OXYTOCIN 10 UNIT/ML
333.33 VIAL (ML) INJECTION
Qty: 20 | Refills: 0 | Status: DISCONTINUED | OUTPATIENT
Start: 2024-01-15 | End: 2024-01-17

## 2024-01-15 RX ORDER — DIPHENHYDRAMINE HCL 50 MG
50 CAPSULE ORAL ONCE
Refills: 0 | Status: DISCONTINUED | OUTPATIENT
Start: 2024-01-15 | End: 2024-01-15

## 2024-01-15 RX ADMIN — Medication 975 MILLIGRAM(S): at 21:38

## 2024-01-15 RX ADMIN — Medication 400 MILLIGRAM(S): at 13:51

## 2024-01-15 RX ADMIN — Medication 30 MILLIGRAM(S): at 13:10

## 2024-01-15 RX ADMIN — HYDROMORPHONE HYDROCHLORIDE 30 MILLILITER(S): 2 INJECTION INTRAMUSCULAR; INTRAVENOUS; SUBCUTANEOUS at 13:38

## 2024-01-15 RX ADMIN — Medication 975 MILLIGRAM(S): at 21:08

## 2024-01-15 RX ADMIN — Medication 200 MILLIGRAM(S): at 23:19

## 2024-01-15 RX ADMIN — HYDROMORPHONE HYDROCHLORIDE 0.5 MILLIGRAM(S): 2 INJECTION INTRAMUSCULAR; INTRAVENOUS; SUBCUTANEOUS at 14:20

## 2024-01-15 RX ADMIN — Medication 1000 MILLIUNIT(S)/MIN: at 14:32

## 2024-01-15 RX ADMIN — ENOXAPARIN SODIUM 40 MILLIGRAM(S): 100 INJECTION SUBCUTANEOUS at 23:20

## 2024-01-15 NOTE — OB PROVIDER H&P - ATTENDING COMMENTS
32 yo P2 36+3 weeks c/o decreased FM found to have persistent category II tracing not improving with resuscitative measures, for urgent PLTCS. Patient on Heparin 50mg BID, for antiphospholipid syndrome, lupus, VSD, last dose 7 am. Patient also last ate breakfast at 730 am. Anesthesia notified. Patient to get general anesthesia and receive protamine, with 2 units on hold. R/B/A of CS explained to patient in detail, patient verbalized understanding and agreed to the procedure. 34 yo P2 36+3 weeks c/o decreased FM found to have persistent category II tracing not improving with resuscitative measures, for urgent PLTCS. Patient on Heparin 50mg BID, for antiphospholipid syndrome, lupus, VSD, last dose 7 am. Patient also last ate breakfast at 730 am. Anesthesia notified. Patient to get general anesthesia and receive protamine, with 2 units on hold. R/B/A of CS explained to patient in detail, patient verbalized understanding and agreed to the procedure.

## 2024-01-15 NOTE — OB RN PATIENT PROFILE - NSSTATEDREASONFORADM_OBGYN_A_OB_FT
" I have dec FM since x2 days. I went to work today and told my job they put me on the monitor ans said go striaght to the hospital " Pt arrived with tracing from office CAT II minimal variability and late decelerations:

## 2024-01-15 NOTE — OB RN PATIENT PROFILE - FUNCTIONAL ASSESSMENT - DAILY ACTIVITY PT AGE POP HIDDEN
Student's Name:   Bryant Bernal Birth Date  2009  Sex  male  Race/Ethnicity   School/Grade Level/ID#     Address:   [facility] [address]  Parent/Guardian             Telephone#                                            Home:                               Work:   IMMUNIZATIONS: To be completed by health care provider. The mo/da/yr for every dose administered is required. If a specific vaccine is medically contraindicated, a separate written statement must be attached by the health care responsible for completing the health examination explaining the medical reason for the contraindication.      Immunization History   Administered Date(s) Administered   • COVID Pfizer 12Y+ (Requires Dilution) 07/15/2021, 08/05/2021   • DTaP(INFANRIX) 12/28/2011   • DTaP/HIB/IPV 2009, 2009, 2009   • DTaP/IPV 06/12/2014   • HPV 9-Valent 04/07/2021, 06/30/2022   • Hep A, Unspecified formulation 08/23/2010, 12/28/2011   • Hep B, adult 2009, 2009, 2009   • Hib (PRP-OMP) 10/19/2010   • Influenza, intranasal, quadrivalent, live (FLUMIST) 09/21/2012, 12/09/2014   • Influenza, quadrivalent, preserve-free 11/12/2015, 11/07/2016, 09/28/2017, 01/20/2020   • Influenza, seasonal, injectable, trivalent 09/20/2010, 11/03/2011   • MMR 10/19/2010, 06/12/2014   • Meningococcal Conjugate MCV4O (Menveo) 04/07/2021   • Novel Influenza A2Y0-19 2009   • PPD 06/12/2014   • Pneumococcal Conjugate 13 Valent Vacc (Prevnar 13) 08/23/2010   • Pneumococcal Conjugate 7 Valent 2009, 2009, 2009   • Rotavirus - pentavalent 2009, 2009, 2009   • Tdap 04/07/2021   • Varicella 08/23/2010, 06/12/2014      Immunizations given 6/20/2023: None      Health care provider (MD, DO, APN, PA, school health professional, health official) verifying above immunization history must sign below. If adding dates to the above immunization history section, put your initials by date(s) and  sign here.)  Signature                            MD                                               Date 6/20/23  _____________________________________________________________________________________  Signature                                                                 Title                                                Date  _____________________________________________________________________________________   ALTERNATIVE PROOF OF IMMUNITY   1. Clinical diagnosis (measles, mumps, hepatitis B) is allowed when verified by physician and supported with lab confirmation.  Attach copy of lab result.    * MEASLES ([place])  MO   DA  YR          **MUMPS  MO   DA  YR             HEPATITIS B  MO   DA   YR           VARICELLA  MO   DA  YR      2. History of varicella (chickenpox) disease is acceptable if verified by health care provider, school health professional or health official.  Person signing below verifies that the parent/guardian’s description of varicella disease history is indicative of past infection and is accepting such history as documentation of disease.  Date of Disease                                  Signature                                                           Title   3. Laboratory Evidence of Immunity (check one)      __ Measles*         __ Mumps**        __ Rubella        __Varicella    (Attach copy of lab result)         *All measles cases diagnosed on or after July 1, 2002, must be confirmed by laboratory evidence.      **All mumps cases diagnosed on or after July 1, 2013, must be confirmed by laboratory evidence.     Completion of Alternative 1 or 3 MUST be accompanied by Labs & Physician Signature: ___________________________  Physician Statements of Immunity MUST be submitted to ID for review.     Certificates of [employer] Exemption to Immunizations or Physician Medical Statements of Medical Contraindication Are Reviewed   and Maintained by the School Authority     (11/2015)                                          (COMPLETE BOTH SIDES)                                  Approved [facility] 3/2016      HEALTH HISTORY      TO BE COMPLETED AND SIGNED BY PARENT/GUARDIAN AND VERIFIED BY HEALTH CARE PROVIDER  ALLERGIES: (Food, drug, insect, other) is allergic to penicillins.   MEDICATION: (List all prescribed or taken on a regular basis.) has a current medication list which includes the following prescription(s): Multiple Vitamins-Minerals (MULTIVITAL PO) and azithromycin (Zithromax Z-Asher) 250 MG tablet.   Diagnosis of asthma?  Child wakes during night coughing? Yes    No  Yes    No  Loss of function of one of paired  organs?(eye/ear/kidney/testicle) Yes    No    Birth defects? Yes    No  Hospitalizations? Yes    No    Developmental delay? Yes    No   When? What for? Yes    No    Blood disorders? Hemophilia,  Sickle Cell, Other? Explain. Yes    No  Surgery? (List all.)  When? What for? Yes    No    Diabetes? Yes    No  Serious injury or illness? Yes    No    Head injury/Concussion/Passed out? Yes    No  TB skin test positive (past/present)? * Yes    No *[facility]   Seizures? What are they like?  Yes    No  TB disease (past or present)? * Yes    No *[facility] yes[facility]   Heart problem/Shortness of breath?  Yes    No  Tobacco use (type, frequency)?  Yes    No    Heart murmur/High blood pressure? Yes    No  Alcohol/Drug use?  Yes    No    Dizziness or chest pain with exercise? Yes    No  Family history of sudden death  before age 50? (Cause?) Yes    No    Eye/Vision problems? ______           __Glasses          __Contacts  Last exam by eye doctor ______  Other concerns? (crossed eye, drooping lids, squinting, difficulty reading) Dental?   __ Braces     __ Bridge     __ Plate   __Other   Ear/Hearing problems? Yes    No  Information may be shared with appropriate personnel for health and educational purposes.   Bone/Joint problem/injury/scoliosis? Yes    No   Parent/Guardian  Signature                                               Date   PHYSICAL EXAMINATION REQUIREMENTS   Entire section below to be completed by MD//ADRY/PA Head Circumference if <2-3 years old - /73   Pulse 68   Temp 99.6 °F (37.6 °C) (Temporal)   Ht 5' 7.36\" (1.711 m)   Wt 57.3 kg (126 lb 6.4 oz)   BMI 19.58 kg/m²   BSA 1.67 m²    57 %ile (Z= 0.17) based on CDC (Boys, 2-20 Years) BMI-for-age based on BMI available as of 6/20/2023.   DIABETES SCREENING (NOT REQUIRED FOR ) BMI>85% age/sex: No     And any two of the following: Family History: No  Ethnic Minority: No    Signs of Insulin Resistance (hypertension, dyslipidemia, polycystic ovarian syndrome, acanthosis nigricans): No  At Risk: No   LEAD RISK QUESTIONNAIRE Required for children age 6 months through 6 years enrolled in licensed or public school operated day care, , nursery school and/or . (Blood test required if resides in [place] or high risk zip code.)  Questionnaire Administered? No  Blood Test Indicated? No   Blood Test Date/Result:    TB SKIN OR BLOOD TEST Recommended only for children in high-risk groups including children immunosuppressed due to HIV infection or other conditions, frequent travel to or born in high prevalence countries or those exposed to adults in high-risk categories. See CDC guidelines. http://www.cdc.gov/tb/publications/factsheets/testing/TB_testing.htm.  Test Needed: No     Test performed: No                          Skin Test:    Date Read              /      /             Result:   Positive__      Negative__        mm________                          Blood Test: Date Reported       /      /               Result:  Positive__      Negative__      Value________  LAB TESTS (recommended) Date/Result  Date/Results   Hemoglobin or Hematocrit 16.1 (1/17/2023) Sickle Cell (when indicated)    Urinalysis NA Developmental Screening Tool Normal - ASQ        SYSTEM REVIEW Normal   Comments/Follow-up/Needs  Normal Comments/Follow-up/Needs   Skin  Yes  Endocrine Yes    Ears Yes                  Screening Result Gastrointestinal Yes    Eyes Yes                  Screening Result: Genito-Urinary Yes                                      LMP:    Nose Yes  Neurologic Yes    Throat Yes  Musculoskeletal Yes    Mouth/Dental Yes  Spinal Exam Yes    Cardiovascular/HTN Yes  Nutritional status Yes    Respiratory Yes Diagnosis of Asthma: No   Mental Health Yes    Currently Prescribed Asthma Medication:        No  Quick-relief medication (e.g. Short Acting Beta Antagonist)        No  Controller medication (e.g. inhaled corticosteroid) Other     NEEDS/MODIFICATIONS required in the school setting: No restrictions DIETARY Needs/Restrictions: No restrictions   SPECIAL INSTRUCTIONS/DEVICES e.g. safety glasses, glass eye, chest protector for arrhythmia, pacemaker, prosthetic device, dental bridge, false teeth, athletic support/cup: No restrictions   MENTAL HEALTH/OTHER Is there anything else the school should know about this student?  If you would like to discuss this student’s health with school or school health personnel:  Not needed   EMERGENCY ACTION needed while at school due to child’s health condition (e.g. ,seizures, asthma, insect sting, food, peanut allergy, bleeding problem, diabetes, heart problem)?   No   On the basis of the examination on this day, I approve this child’s participation in                                (If No or Modified please attach explanation.)  PHYSICAL EDUCATION:  Yes                               [employer]   Yes   Print Name  MD Eamon Bermudez MD                                                                      Date: 6/20/2023   Address:  [address] [address]  [phone]         (Complete Both Sides)      Approved [facility] 3/2016   Adult

## 2024-01-15 NOTE — OB RN PATIENT PROFILE - NS_OBGYNHISTORY_OBGYN_ALL_OB_FT
see HPI   2011 at 36 weeks PTL      LUPUS   Antiphospholipid on Heparin     Had 2 days of Heparin last dose at 0630am.

## 2024-01-15 NOTE — OB RN PATIENT PROFILE - FUNCTIONAL ASSESSMENT - BASIC MOBILITY 6.
4-calculated by average/Not able to assess (calculate score using [facility] averaging method)  4-calculated by average/Not able to assess (calculate score using [facility] averaging method)  4-calculated by average/Not able to assess (calculate score using [facility] averaging method)

## 2024-01-15 NOTE — OB RN DELIVERY SUMMARY - NS_NEWBORNRN1_OBGYN_ALL_OB_FT
TSH 1.36, T4 5.7  - C/w home levothyroxine 88mcg - C/w home Losartan 25mg daily  - Monitor routine hemodynamics Nayely Lynch RN

## 2024-01-15 NOTE — OB PROVIDER DELIVERY SUMMARY - NSSELHIDDEN_OBGYN_ALL_OB_FT
[NS_DeliveryAttending1_OBGYN_ALL_OB_FT:[id number]==],[NS_DeliveryAssist1_OBGYN_ALL_OB_FT:[email or web address]=] [NS_DeliveryAttending1_OBGYN_ALL_OB_FT:[id number]==],[NS_DeliveryAssist1_OBGYN_ALL_OB_FT:[email or web address]=] [NS_DeliveryAttending1_OBGYN_ALL_OB_FT:[id number]==],[NS_DeliveryAssist1_OBGYN_ALL_OB_FT:MjIyMjYyMDExOTA=] [NS_DeliveryAttending1_OBGYN_ALL_OB_FT:[id number]==],[NS_DeliveryAssist1_OBGYN_ALL_OB_FT:[email or web address]=]

## 2024-01-15 NOTE — OB PROVIDER H&P - ASSESSMENT
34 y/o  @ 36w3d (MATT 2024) presents with decreased FM x 3 days and persistent category 2 tracing on arrival.  - Admit to L&D  - Routine labs, coags  - NPO  - IVH  - LLD position   - Anesthesia consult, aware of heparin dosing time  - Anticipate pLTCS   32 y/o  @ 36w3d (MATT 2024) presents with decreased FM x 3 days and persistent category 2 tracing on arrival.  - Admit to L&D  - Routine labs, coags  - NPO  - IVH  - LLD position   - Anesthesia consult, aware of heparin dosing time  - Anticipate pLTCS

## 2024-01-15 NOTE — OB PROVIDER H&P - HISTORY OF PRESENT ILLNESS
32 y/o  @ 36w3d (MATT 2024) presents with decreased FM x 3 days. 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 Denies LOF, VB. +FM.  EFW  2270g 5#7 1/10. Plan for IOL at 38weeks gestation. Last heparin dose 0630, last ate/drank 0730.     PNC complicated by hx SLE, APLS with hx of R ulnar artery thrombosis in , FGR 7th%ile (AC<1st, HC 9th), maternal VSD (reports cleared by cardiology during pregnancy), hx PPROM, covid + 24    ObHx: PPROM   5#, FT   7#, SAB x1, TOP x 3  GynHx: Reports hx of abnl pap with colpo last pap WNL. Denies hx of fibroids, ovarian cysts, STDs  MedHx: SLE, APLS, Denies hx of HTN, DM, asthma, thyroid problems, hx of blood transfusions  Meds: PNV, heparin 50mg bid, ASA, Plaquenil 200mg bid   All: NKDA  PSHx: Denies  FHx: Denies hx of blood clots/bleeding problems  Social: Denies alcohol/tobacco/drug use in pregnancy  Psych: Denies hx of anxiety/depression    PE:  VS per CPN  Gen: NAD  Abd: soft non tender gravid  Extrem: no calf tenderness  EFM: 150 minimal variability no accels late decels  [place]: irregular   34 y/o  @ 36w3d (MATT 2024) presents with decreased FM x 3 days. 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 Denies LOF, VB. +FM.  EFW  2270g 5#7 1/10. Plan for IOL at 38weeks gestation. Last heparin dose 0630, last ate/drank 0730.     PNC complicated by hx SLE, APLS with hx of R ulnar artery thrombosis in , FGR 7th%ile (AC<1st, HC 9th), maternal VSD (reports cleared by cardiology during pregnancy), hx PPROM, covid + 24    ObHx: PPROM   5#, FT   7#, SAB x1, TOP x 3  GynHx: Reports hx of abnl pap with colpo last pap WNL. Denies hx of fibroids, ovarian cysts, STDs  MedHx: SLE, APLS, Denies hx of HTN, DM, asthma, thyroid problems, hx of blood transfusions  Meds: PNV, heparin 50mg bid, ASA, Plaquenil 200mg bid   All: NKDA  PSHx: Denies  FHx: Denies hx of blood clots/bleeding problems  Social: Denies alcohol/tobacco/drug use in pregnancy  Psych: Denies hx of anxiety/depression    PE:  VS per CPN  Gen: NAD  Abd: soft non tender gravid  Extrem: no calf tenderness  EFM: 150 minimal variability no accels late decels  [place]: irregular   34 y/o  @ 36w3d (MATT 2024) presents with decreased FM x 3 days. 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 Denies LOF, VB. +FM.  EFW  2270g 5#7 1/10. Plan for IOL at 38weeks gestation. Last heparin dose 0630, last ate/drank 0730.     PNC complicated by hx SLE, APLS with hx of R ulnar artery thrombosis in , FGR 7th%ile (AC<1st, HC 9th), maternal VSD (reports cleared by cardiology during pregnancy), hx PPROM, covid + 24    ObHx: PPROM   5#, FT   7#, SAB x1, TOP x 3  GynHx: Reports hx of abnl pap with colpo last pap WNL. Denies hx of fibroids, ovarian cysts, STDs  MedHx: SLE, APLS, Denies hx of HTN, DM, asthma, thyroid problems, hx of blood transfusions  Meds: PNV, heparin 50mg bid, ASA, Plaquenil 200mg bid   All: NKDA  PSHx: Denies  FHx: Denies hx of blood clots/bleeding problems  Social: Denies alcohol/tobacco/drug use in pregnancy  Psych: Denies hx of anxiety/depression    PE:  VS per CPN  Gen: NAD  Abd: soft non tender gravid  Extrem: no calf tenderness  EFM: 150 minimal variability no accels late decels  [place]: irregular

## 2024-01-15 NOTE — OB RN INTRAOPERATIVE NOTE - NSSELHIDDEN_OBGYN_ALL_OB_FT
[NS_DeliveryAttending1_OBGYN_ALL_OB_FT:[id number]==],[NS_DeliveryAssist1_OBGYN_ALL_OB_FT:[email or web address]=],[NS_DeliveryRN_OBGYN_ALL_OB_FT:[id number]==],[NS_CirculateRN2_OBGYN_ALL_OB_FT:[id number]=] [NS_DeliveryAttending1_OBGYN_ALL_OB_FT:[id number]==],[NS_DeliveryAssist1_OBGYN_ALL_OB_FT:[email or web address]=],[NS_DeliveryRN_OBGYN_ALL_OB_FT:[id number]==],[NS_CirculateRN2_OBGYN_ALL_OB_FT:[id number]=] [NS_DeliveryAttending1_OBGYN_ALL_OB_FT:[id number]==],[NS_DeliveryAssist1_OBGYN_ALL_OB_FT:[email or web address]=],[NS_DeliveryRN_OBGYN_ALL_OB_FT:[id number]==],[NS_CirculateRN2_OBGYN_ALL_OB_FT:[id number]=]

## 2024-01-15 NOTE — OB PROVIDER DELIVERY SUMMARY - NSPROVIDERDELIVERYNOTE_OBGYN_ALL_OB_FT
non scheduled urgent pLTCS for NRFHT   Viable male infant, apgars 5/8, weight 2175g  Nucal cord x 4, 1 body cord, true knot  Grossly normal uterus, tubes, and ovaries  Abdomen closed in standard fashion  Placenta to pathology  EBL:  400   Patient and infant in stable condition non scheduled urgent pLTCS for NRFHT   Nucal cord x 4, 1 body cord, true knot  Viable male infant, apgars 5/8, weight 2175g  Grossly normal uterus, tubes, and ovaries  Abdomen closed in standard fashion  Placenta to pathology  EBL:  400   Patient and infant in stable condition    See dictation for further operative details

## 2024-01-15 NOTE — OB RN DELIVERY SUMMARY - NSSELHIDDEN_OBGYN_ALL_OB_FT
[NS_DeliveryAttending1_OBGYN_ALL_OB_FT:[id number]==],[NS_DeliveryAssist1_OBGYN_ALL_OB_FT:[email or web address]=],[NS_DeliveryRN_OBGYN_ALL_OB_FT:[id number]==],[NS_CirculateRN2_OBGYN_ALL_OB_FT:[id number]=] [NS_DeliveryAttending1_OBGYN_ALL_OB_FT:[id number]==],[NS_DeliveryAssist1_OBGYN_ALL_OB_FT:[email or web address]=],[NS_DeliveryRN_OBGYN_ALL_OB_FT:[id number]==],[NS_CirculateRN2_OBGYN_ALL_OB_FT:[id number]=] [NS_DeliveryAttending1_OBGYN_ALL_OB_FT:[id number]==],[NS_DeliveryAssist1_OBGYN_ALL_OB_FT:MjIyMjYyMDExOTA=],[NS_DeliveryRN_OBGYN_ALL_OB_FT:[id number]==],[NS_CirculateRN2_OBGYN_ALL_OB_FT:[id number]=]

## 2024-01-15 NOTE — OB NEONATOLOGY/PEDIATRICIAN DELIVERY SUMMARY - NSPEDSNEONOTESA_OBGYN_ALL_OB_FT
Called by OB service to attend an urgent  delivery.  Mother presented to the ER with decreased fetal movement x 2 days and concerning fetal tracing. Baby is  product of a 36.4 week gestation born to a G 7 , Mom is 33 years of age   Maternal labs include Blood Type  B+  , HIV neg, RPR NR , Hep B-, GBS unknown. Maternal history is significant for Lupus with antiphospholipid syndrome (mother has history of blood clot in R arm in 2015.  She is currently on heparin. ROM at delivery with thick meconium.  Infant brought to warmer with decreased tone and inconsistent respiratory effort.  He required PPV 20/5, max FiO2 80%  to keep oxygen at target sat goal.  Infant responded to PPV and was transitioned to CPAP.  Oxygen requirement decreased and infant transitioned to room air prior to arrival in the nursery.  Total time on resp support ~15 min

## 2024-01-15 NOTE — OB RN PATIENT PROFILE - FALL HARM RISK - UNIVERSAL INTERVENTIONS
Bed in lowest position, wheels locked, appropriate side rails in place/Call bell, personal items and telephone in reach/Instruct patient to call for assistance before getting out of bed or chair/Non-slip footwear when patient is out of bed/[place] to call system/Physically safe environment - no spills, clutter or unnecessary equipment/Purposeful Proactive Rounding/Room/bathroom lighting operational, light cord in reach Bed in lowest position, wheels locked, appropriate side rails in place/Call bell, personal items and telephone in reach/Instruct patient to call for assistance before getting out of bed or chair/Non-slip footwear when patient is out of bed/[place] to call system/Physically safe environment - no spills, clutter or unnecessary equipment/Purposeful Proactive Rounding/Room/bathroom lighting operational, light cord in reach Bed in lowest position, wheels locked, appropriate side rails in place/Call bell, personal items and telephone in reach/Instruct patient to call for assistance before getting out of bed or chair/Non-slip footwear when patient is out of bed/[place] to call system/Physically safe environment - no spills, clutter or unnecessary equipment/Purposeful Proactive Rounding/Room/bathroom lighting operational, light cord in reach

## 2024-01-15 NOTE — OB PROVIDER H&P - NSLOWPPHRISK_OBGYN_A_OB
[place] Pregnancy/Less than or equal to 4 previous vaginal births/No history of postpartum hemorrhage/No other PPH risks indicated

## 2024-01-16 ENCOUNTER — NON-APPOINTMENT (OUTPATIENT)
Age: 34
End: 2024-01-16

## 2024-01-16 ENCOUNTER — APPOINTMENT (OUTPATIENT)
Dept: OBGYN | Facility: CLINIC | Age: 34
End: 2024-01-16

## 2024-01-16 DIAGNOSIS — Z3A.35 35 WEEKS GESTATION OF PREGNANCY: ICD-10-CM

## 2024-01-16 LAB
ALBUMIN SERPL ELPH-MCNC: 2.2 G/DL — LOW (ref 3.3–5)
ALP SERPL-CCNC: 148 U/L — HIGH (ref 40–120)
ALT FLD-CCNC: 18 U/L — SIGNIFICANT CHANGE UP (ref 12–78)
ANION GAP SERPL CALC-SCNC: 6 MMOL/L — SIGNIFICANT CHANGE UP (ref 5–17)
AST SERPL-CCNC: 39 U/L — HIGH (ref 15–37)
BASOPHILS # BLD AUTO: 0.04 K/UL — SIGNIFICANT CHANGE UP (ref 0–0.2)
BASOPHILS NFR BLD AUTO: 0.4 % — SIGNIFICANT CHANGE UP (ref 0–2)
BILIRUB SERPL-MCNC: 0.3 MG/DL — SIGNIFICANT CHANGE UP (ref 0.2–1.2)
BUN SERPL-MCNC: 5 MG/DL — LOW (ref 7–23)
CALCIUM SERPL-MCNC: 8 MG/DL — LOW (ref 8.5–10.1)
CHLORIDE SERPL-SCNC: 109 MMOL/L — HIGH (ref 96–108)
CO2 SERPL-SCNC: 26 MMOL/L — SIGNIFICANT CHANGE UP (ref 22–31)
CREAT SERPL-MCNC: 0.56 MG/DL — SIGNIFICANT CHANGE UP (ref 0.5–1.3)
EGFR: 124 ML/MIN/1.73M2 — SIGNIFICANT CHANGE UP
EOSINOPHIL # BLD AUTO: 0.01 K/UL — SIGNIFICANT CHANGE UP (ref 0–0.5)
EOSINOPHIL NFR BLD AUTO: 0.1 % — SIGNIFICANT CHANGE UP (ref 0–6)
GLUCOSE SERPL-MCNC: 124 MG/DL — HIGH (ref 70–99)
HCT VFR BLD CALC: 30.8 % — LOW (ref 34.5–45)
HGB BLD-MCNC: 11.1 G/DL — LOW (ref 11.5–15.5)
IMM GRANULOCYTES NFR BLD AUTO: 0.4 % — SIGNIFICANT CHANGE UP (ref 0–0.9)
LYMPHOCYTES # BLD AUTO: 0.94 K/UL — LOW (ref 1–3.3)
LYMPHOCYTES # BLD AUTO: 9.1 % — LOW (ref 13–44)
MCHC RBC-ENTMCNC: 31.6 PG — SIGNIFICANT CHANGE UP (ref 27–34)
MCHC RBC-ENTMCNC: 36 GM/DL — SIGNIFICANT CHANGE UP (ref 32–36)
MCV RBC AUTO: 87.7 FL — SIGNIFICANT CHANGE UP (ref 80–100)
MONOCYTES # BLD AUTO: 0.55 K/UL — SIGNIFICANT CHANGE UP (ref 0–0.9)
MONOCYTES NFR BLD AUTO: 5.3 % — SIGNIFICANT CHANGE UP (ref 2–14)
NEUTROPHILS # BLD AUTO: 8.77 K/UL — HIGH (ref 1.8–7.4)
NEUTROPHILS NFR BLD AUTO: 84.7 % — HIGH (ref 43–77)
PLATELET # BLD AUTO: 246 K/UL — SIGNIFICANT CHANGE UP (ref 150–400)
POTASSIUM SERPL-MCNC: 3.8 MMOL/L — SIGNIFICANT CHANGE UP (ref 3.5–5.3)
POTASSIUM SERPL-SCNC: 3.8 MMOL/L — SIGNIFICANT CHANGE UP (ref 3.5–5.3)
PROT SERPL-MCNC: 5.6 GM/DL — LOW (ref 6–8.3)
RBC # BLD: 3.51 M/UL — LOW (ref 3.8–5.2)
RBC # FLD: 13.3 % — SIGNIFICANT CHANGE UP (ref 10.3–14.5)
SODIUM SERPL-SCNC: 141 MMOL/L — SIGNIFICANT CHANGE UP (ref 135–145)
T PALLIDUM AB TITR SER: NEGATIVE — SIGNIFICANT CHANGE UP
WBC # BLD: 10.35 K/UL — SIGNIFICANT CHANGE UP (ref 3.8–10.5)
WBC # FLD AUTO: 10.35 K/UL — SIGNIFICANT CHANGE UP (ref 3.8–10.5)

## 2024-01-16 RX ORDER — OXYCODONE HYDROCHLORIDE 5 MG/1
5 TABLET ORAL
Refills: 0 | Status: DISCONTINUED | OUTPATIENT
Start: 2024-01-16 | End: 2024-01-17

## 2024-01-16 RX ORDER — IBUPROFEN 200 MG
600 TABLET ORAL EVERY 6 HOURS
Refills: 0 | Status: DISCONTINUED | OUTPATIENT
Start: 2024-01-16 | End: 2024-01-17

## 2024-01-16 RX ADMIN — OXYCODONE HYDROCHLORIDE 5 MILLIGRAM(S): 5 TABLET ORAL at 10:03

## 2024-01-16 RX ADMIN — MAGNESIUM HYDROXIDE 30 MILLILITER(S): 400 TABLET, CHEWABLE ORAL at 10:03

## 2024-01-16 RX ADMIN — Medication 975 MILLIGRAM(S): at 15:29

## 2024-01-16 RX ADMIN — OXYCODONE HYDROCHLORIDE 5 MILLIGRAM(S): 5 TABLET ORAL at 10:04

## 2024-01-16 RX ADMIN — Medication 975 MILLIGRAM(S): at 04:13

## 2024-01-16 RX ADMIN — Medication 200 MILLIGRAM(S): at 10:36

## 2024-01-16 RX ADMIN — Medication 975 MILLIGRAM(S): at 22:30

## 2024-01-16 RX ADMIN — Medication 30 MILLIGRAM(S): at 06:14

## 2024-01-16 RX ADMIN — SIMETHICONE 80 MILLIGRAM(S): 80 TABLET, CHEWABLE ORAL at 00:51

## 2024-01-16 RX ADMIN — Medication 30 MILLIGRAM(S): at 06:44

## 2024-01-16 RX ADMIN — Medication 975 MILLIGRAM(S): at 03:43

## 2024-01-16 RX ADMIN — Medication 600 MILLIGRAM(S): at 13:12

## 2024-01-16 RX ADMIN — Medication 30 MILLIGRAM(S): at 00:31

## 2024-01-16 RX ADMIN — Medication 600 MILLIGRAM(S): at 18:57

## 2024-01-16 RX ADMIN — OXYCODONE HYDROCHLORIDE 5 MILLIGRAM(S): 5 TABLET ORAL at 15:04

## 2024-01-16 RX ADMIN — SIMETHICONE 80 MILLIGRAM(S): 80 TABLET, CHEWABLE ORAL at 10:03

## 2024-01-16 RX ADMIN — Medication 30 MILLIGRAM(S): at 00:01

## 2024-01-16 RX ADMIN — SIMETHICONE 80 MILLIGRAM(S): 80 TABLET, CHEWABLE ORAL at 18:38

## 2024-01-16 RX ADMIN — Medication 975 MILLIGRAM(S): at 08:52

## 2024-01-16 RX ADMIN — Medication 600 MILLIGRAM(S): at 18:38

## 2024-01-16 RX ADMIN — Medication 600 MILLIGRAM(S): at 12:21

## 2024-01-16 RX ADMIN — OXYCODONE HYDROCHLORIDE 5 MILLIGRAM(S): 5 TABLET ORAL at 01:36

## 2024-01-16 RX ADMIN — Medication 200 MILLIGRAM(S): at 21:27

## 2024-01-16 RX ADMIN — Medication 975 MILLIGRAM(S): at 21:27

## 2024-01-16 RX ADMIN — Medication 975 MILLIGRAM(S): at 15:04

## 2024-01-16 RX ADMIN — OXYCODONE HYDROCHLORIDE 5 MILLIGRAM(S): 5 TABLET ORAL at 13:48

## 2024-01-16 RX ADMIN — Medication 975 MILLIGRAM(S): at 09:07

## 2024-01-16 NOTE — PROGRESS NOTE ADULT - ASSESSMENT
DANILO RUSHING is a 33y  now POD#1 s/p primary  section at 36w3d for persistent category 2 tracing on prolonged monitoring (BPP 6/10) under general anesthesia for recent heparin use (on anticoagulation for hx APLS with VTE)    -Vital signs stable  -Hgb: 12.7>11.1  -Voiding, tolerating PO, bowel function nml   -Advance care as tolerated   -Continue routine postpartum and postoperative care and education  -Healthy infant  -On Lovenox 40 - will need 6w of PP Lovenox  -Dispo: Continue w inpatient management

## 2024-01-16 NOTE — PROGRESS NOTE ADULT - SUBJECTIVE AND OBJECTIVE BOX
Postpartum Note,  Section  She is a  33y woman who is now post-operative day: #1    Subjective:  The patient feels well.  She is ambulating.   She is tolerating regular diet.  She denies nausea and vomiting.  She is voiding.  Her pain is controlled.  She reports normal postpartum bleeding.        Physical exam:    Vital Signs Last 24 Hrs  T(C): 37.2 (2024 08:30), Max: 37.2 (2024 08:30)  T(F): 98.9 (2024 08:30), Max: 98.9 (2024 08:30)  HR: 96 (2024 08:30) (65 - 99)  BP: 108/74 (2024 08:30) (93/62 - 110/81)  BP(mean): --  RR: 17 (2024 08:30) (16 - 18)  SpO2: 100% (2024 08:30) (98% - 100%)    Parameters below as of 2024 08:30  Patient On (Oxygen Delivery Method): room air        Gen: NAD  Breast: Soft, nontender, not engorged.  Abdomen: Soft, nontender, no distension , firm uterine fundus at umbilicus.  Incision: Clean, dry, and intact with steri strips  Pelvic: Normal lochia noted  Ext: No calf tenderness    LABS:                        11.1   10.35 )-----------( 246      ( 2024 08:31 )             30.8     Antibody Screen: NEG (01-15 @ 10:36)    Rubella status:     Allergies    No Known Allergies    Intolerances      MEDICATIONS  (STANDING):  acetaminophen     Tablet .. 975 milliGRAM(s) Oral <User Schedule>  diphtheria/tetanus/pertussis (acellular) Vaccine (Adacel) 0.5 milliLiter(s) IntraMuscular once  enoxaparin Injectable 40 milliGRAM(s) SubCutaneous every 24 hours  hydroxychloroquine 200 milliGRAM(s) Oral two times a day  ibuprofen  Tablet. 600 milliGRAM(s) Oral every 6 hours  ketorolac   Injectable 30 milliGRAM(s) IV Push every 6 hours  oxytocin Infusion 333.333 milliUNIT(s)/Min (1000 mL/Hr) IV Continuous <Continuous>    MEDICATIONS  (PRN):  diphenhydrAMINE 25 milliGRAM(s) Oral every 6 hours PRN Pruritus  lanolin Ointment 1 Application(s) Topical every 6 hours PRN Sore Nipples  magnesium hydroxide Suspension 30 milliLiter(s) Oral two times a day PRN Constipation  oxyCODONE    IR 5 milliGRAM(s) Oral every 3 hours PRN Moderate to Severe Pain (4-10)  oxyCODONE    IR 5 milliGRAM(s) Oral once PRN Moderate to Severe Pain (4-10)  simethicone 80 milliGRAM(s) Chew every 4 hours PRN Gas        Assessment and Plan  POD #1 s/p c/s  Doing well.  pt on lovenox due to hx   pt advised she will be on lovenox 40mg qd for 6 weeks postpartum  Encourage ambulation.  Cont progressive care

## 2024-01-16 NOTE — PROGRESS NOTE ADULT - SUBJECTIVE AND OBJECTIVE BOX
DANILO RUSHING is a 33y  now POD#1 s/p primary  section at 36w3d for persistent category 2 tracing on prolonged monitoring (BPP 6/10) under general anesthesia for recent heparin use (on anticoagulation for hx APLS with VTE)    S:    No acute events overnight.   The patient has no complaints.  Pain controlled with current treatment regimen.   She is ambulating without difficulty and tolerating PO.   + flatus/-BM/+ voiding   She endorses appropriate lochia, which is decreasing.    She denies fevers, chills, nausea and vomiting.   She denies lightheadedness, dizziness, palpitations, chest pain and SOB.     O:    T(C): 37.2 (24 @ 12:30), Max: 37.2 (24 @ 08:30)  HR: 90 (24 @ 12:30) (66 - 99)  BP: 108/75 (24 @ 12:30) (93/62 - 110/80)  RR: 17 (24 @ 12:30) (16 - 18)  SpO2: 100% (24 @ 12:30) (98% - 100%)    Gen: NAD, AOx3  CV: RRR, S1/S2 present  Pulm: CTAB  Abdomen:  Soft, non-tender, non-distended, +bowel sounds  Incision: Clean/dry/intact with NP seal in place   Uterus:  Fundus firm below umbilicus  VE:  Expectant lochia  Ext:  Non-tender and non-edematous                          11.1   10.35 )-----------( 246      ( 2024 08:31 )             30.8         141  |  109<H>  |  5<L>  ----------------------------<  124<H>  3.8   |  26  |  0.56    Ca    8.0<L>      2024 08:31    TPro  5.6<L>  /  Alb  2.2<L>  /  TBili  0.3  /  DBili  x   /  AST  39<H>  /  ALT  18  /  AlkPhos  148<H>

## 2024-01-17 ENCOUNTER — APPOINTMENT (OUTPATIENT)
Dept: ANTEPARTUM | Facility: CLINIC | Age: 34
End: 2024-01-17

## 2024-01-17 ENCOUNTER — TRANSCRIPTION ENCOUNTER (OUTPATIENT)
Age: 34
End: 2024-01-17

## 2024-01-17 VITALS
RESPIRATION RATE: 17 BRPM | SYSTOLIC BLOOD PRESSURE: 96 MMHG | TEMPERATURE: 98 F | OXYGEN SATURATION: 99 % | HEART RATE: 75 BPM | DIASTOLIC BLOOD PRESSURE: 68 MMHG

## 2024-01-17 RX ORDER — ASPIRIN/CALCIUM CARB/MAGNESIUM 324 MG
1 TABLET ORAL
Qty: 0 | Refills: 0 | DISCHARGE

## 2024-01-17 RX ORDER — ENOXAPARIN SODIUM 100 MG/ML
40 INJECTION SUBCUTANEOUS
Qty: 30 | Refills: 0
Start: 2024-01-17 | End: 2024-02-15

## 2024-01-17 RX ADMIN — Medication 975 MILLIGRAM(S): at 09:13

## 2024-01-17 RX ADMIN — Medication 975 MILLIGRAM(S): at 03:11

## 2024-01-17 RX ADMIN — Medication 975 MILLIGRAM(S): at 10:00

## 2024-01-17 RX ADMIN — Medication 600 MILLIGRAM(S): at 01:14

## 2024-01-17 RX ADMIN — Medication 200 MILLIGRAM(S): at 10:11

## 2024-01-17 RX ADMIN — Medication 600 MILLIGRAM(S): at 06:37

## 2024-01-17 RX ADMIN — Medication 975 MILLIGRAM(S): at 04:00

## 2024-01-17 RX ADMIN — Medication 600 MILLIGRAM(S): at 00:31

## 2024-01-17 RX ADMIN — Medication 600 MILLIGRAM(S): at 12:26

## 2024-01-17 RX ADMIN — Medication 600 MILLIGRAM(S): at 06:39

## 2024-01-17 RX ADMIN — Medication 600 MILLIGRAM(S): at 13:00

## 2024-01-17 RX ADMIN — ENOXAPARIN SODIUM 40 MILLIGRAM(S): 100 INJECTION SUBCUTANEOUS at 00:31

## 2024-01-17 NOTE — DISCHARGE NOTE OB - PATIENT PORTAL LINK FT
You can access the FollowMyHealth Patient Portal offered by [facility] by registering at the following website: [email or web address]. By joining [facility]’s FollowMyHealth portal, you will also be able to view your health information using other applications (apps) compatible with our system.

## 2024-01-17 NOTE — DISCHARGE NOTE OB - MEDICATION SUMMARY - MEDICATIONS TO TAKE
I will START or STAY ON the medications listed below when I get home from the hospital:    predniSONE 5 mg oral tablet  -- 1 tab(s) by mouth once a day  -- Indication: For Other pregnancy-related conditions, antepartum    hydroxychloroquine 200 mg oral tablet  -- 1 tab(s) by mouth 2 times a day  -- Indication: For Other pregnancy-related conditions, antepartum   I will START or STAY ON the medications listed below when I get home from the hospital:    predniSONE 5 mg oral tablet  -- 1 tab(s) by mouth once a day  -- Indication: For Other pregnancy-related conditions, antepartum    Lovenox 40 mg/0.4 mL injectable solution  -- 40 milligram(s) subcutaneously once a day  -- Indication: For Other pregnancy-related conditions, antepartum    hydroxychloroquine 200 mg oral tablet  -- 1 tab(s) by mouth 2 times a day  -- Indication: For Other pregnancy-related conditions, antepartum

## 2024-01-17 NOTE — DISCHARGE NOTE OB - MATERIALS PROVIDED
Vaccinations/  Immunization Record/Bottle Feeding Log/Guide to Postpartum Care/Back To Sleep Handout/Shaken Baby Prevention Handout

## 2024-01-17 NOTE — DISCHARGE NOTE OB - CARE PROVIDER_API CALL
Smooth Catalan  Obstetrics and Gynecology  [address] [place] [address]  Phone: [phone]  Fax: [phone]  Established Patient  Follow Up Time:

## 2024-01-17 NOTE — DISCHARGE NOTE OB - CARE PLAN
Assessment and plan of treatment:	post op and postpartum instructions and precautions reviewed  pt to continue lovenox 40mg sq daily for 6 weeks   Principal Discharge DX:	S/P  section  Assessment and plan of treatment:	post op and postpartum instructions and precautions reviewed  pt to continue lovenox 40mg sq daily for 6 weeks   1

## 2024-01-17 NOTE — PROGRESS NOTE ADULT - SUBJECTIVE AND OBJECTIVE BOX
Postpartum Note,  Section  She is a  33y woman who is now post-operative day: #2    Subjective:  The patient feels well.  She is ambulating without difficulty.  She is tolerating PO.  She is voiding.  She denies nausea and vomiting.  Her pain is controlled.  She reports normal postpartum bleeding  She is breastfeeding.  She is formula feeding.    Physical exam:    Vital Signs Last 24 Hrs  T(C): 36.8 (2024 00:05), Max: 37.4 (2024 20:27)  T(F): 98.3 (2024 00:05), Max: 99.4 (2024 20:27)  HR: 75 (2024 00:05) (75 - 96)  BP: 96/68 (2024 00:05) (96/68 - 108/75)  BP(mean): --  RR: 17 (2024 00:05) (17 - 18)  SpO2: 99% (2024 00:05) (99% - 100%)    Parameters below as of 2024 00:05  Patient On (Oxygen Delivery Method): room air        Gen: NAD  Breast: Soft, nontender, non engorged  Abdomen: Soft, nontender, no distension , firm uterine fundus at umbilicus.  Incision: Clean, dry, and intact with steri strips  Pelvic: Normal lochia noted  Ext: No calf tenderness    LABS:                        11.1   10.35 )-----------( 246      ( 2024 08:31 )             30.8     blood type  Rubella status:     Allergies    No Known Allergies    Intolerances      MEDICATIONS  (STANDING):  acetaminophen     Tablet .. 975 milliGRAM(s) Oral <User Schedule>  diphtheria/tetanus/pertussis (acellular) Vaccine (Adacel) 0.5 milliLiter(s) IntraMuscular once  enoxaparin Injectable 40 milliGRAM(s) SubCutaneous every 24 hours  hydroxychloroquine 200 milliGRAM(s) Oral two times a day  ibuprofen  Tablet. 600 milliGRAM(s) Oral every 6 hours  oxytocin Infusion 333.333 milliUNIT(s)/Min (1000 mL/Hr) IV Continuous <Continuous>    MEDICATIONS  (PRN):  diphenhydrAMINE 25 milliGRAM(s) Oral every 6 hours PRN Pruritus  lanolin Ointment 1 Application(s) Topical every 6 hours PRN Sore Nipples  magnesium hydroxide Suspension 30 milliLiter(s) Oral two times a day PRN Constipation  oxyCODONE    IR 5 milliGRAM(s) Oral once PRN Moderate to Severe Pain (4-10)  oxyCODONE    IR 5 milliGRAM(s) Oral every 3 hours PRN Moderate to Severe Pain (4-10)  simethicone 80 milliGRAM(s) Chew every 4 hours PRN Gas        Assessment and Plan  POD # 2 s/p c/sec  Doing well.  Encourage ambulation.  Discharge home today.  Prescriptions for percocet and motrin electronically sent to the pharmacy.  F/U in  1 and 2 weeks for incision check.  Call for fevers, chills, nausea, vomiting, heavy vaginal bleeding, vaginal discharge, severe pain, symptoms of depression, problems with incision or any other concerning symptoms.  Nothing in vagina and no heavy lifting x 6 weeks.  No driving x 2 weeks.            Postpartum Note,  Section  She is a  33y woman who is now post-operative day: #2    Subjective:  The patient feels well.  She is ambulating without difficulty.  She is tolerating PO.  She is voiding.  She denies nausea and vomiting.  Her pain is controlled.  She reports normal postpartum bleeding  She is breastfeeding.  She is formula feeding.    Physical exam:    Vital Signs Last 24 Hrs  T(C): 36.8 (2024 00:05), Max: 37.4 (2024 20:27)  T(F): 98.3 (2024 00:05), Max: 99.4 (2024 20:27)  HR: 75 (2024 00:05) (75 - 96)  BP: 96/68 (2024 00:05) (96/68 - 108/75)  BP(mean): --  RR: 17 (2024 00:05) (17 - 18)  SpO2: 99% (2024 00:05) (99% - 100%)    Parameters below as of 2024 00:05  Patient On (Oxygen Delivery Method): room air        Gen: NAD  Breast: Soft, nontender, non engorged  Abdomen: Soft, nontender, no distension , firm uterine fundus at umbilicus.  Incision: Clean, dry, and intact with steri strips  Pelvic: Normal lochia noted  Ext: No calf tenderness    LABS:                        11.1   10.35 )-----------( 246      ( 2024 08:31 )             30.8     blood type  Rubella status:     Allergies    No Known Allergies    Intolerances      MEDICATIONS  (STANDING):  acetaminophen     Tablet .. 975 milliGRAM(s) Oral <User Schedule>  diphtheria/tetanus/pertussis (acellular) Vaccine (Adacel) 0.5 milliLiter(s) IntraMuscular once  enoxaparin Injectable 40 milliGRAM(s) SubCutaneous every 24 hours  hydroxychloroquine 200 milliGRAM(s) Oral two times a day  ibuprofen  Tablet. 600 milliGRAM(s) Oral every 6 hours  oxytocin Infusion 333.333 milliUNIT(s)/Min (1000 mL/Hr) IV Continuous <Continuous>    MEDICATIONS  (PRN):  diphenhydrAMINE 25 milliGRAM(s) Oral every 6 hours PRN Pruritus  lanolin Ointment 1 Application(s) Topical every 6 hours PRN Sore Nipples  magnesium hydroxide Suspension 30 milliLiter(s) Oral two times a day PRN Constipation  oxyCODONE    IR 5 milliGRAM(s) Oral once PRN Moderate to Severe Pain (4-10)  oxyCODONE    IR 5 milliGRAM(s) Oral every 3 hours PRN Moderate to Severe Pain (4-10)  simethicone 80 milliGRAM(s) Chew every 4 hours PRN Gas        Assessment and Plan  POD # 2 s/p c/sec  Doing well.  pt to stay on lovenox 40mg sq daily for 6 weeks.  Encourage ambulation.  Discharge home today.  Prescriptions for percocet and motrin electronically sent to the pharmacy.  F/U in  1 and 2 weeks for incision check.  Call for fevers, chills, nausea, vomiting, heavy vaginal bleeding, vaginal discharge, severe pain, symptoms of depression, problems with incision or any other concerning symptoms.  Nothing in vagina and no heavy lifting x 6 weeks.  No driving x 2 weeks.

## 2024-01-17 NOTE — DISCHARGE NOTE OB - PHARMACIST IF YOU MISS A DOSE. TAKE ENOXAPARIN/LOVENOX AT THE SAME TIME EACH MORNING AND/OR EVENING.
I spoke with pts daughter, she states that pt is doing fine and doesn't need to be seen. I advised pts daughter that all she would need to be seen for is to refill her tramadol, states Dr. Tyler refilled the prescription in April. Advised her that Mrs. Willett does not need to come in, just to follow up with Dr. Tyler as needed   Statement Selected

## 2024-01-17 NOTE — DISCHARGE NOTE OB - PLAN OF CARE
post op and postpartum instructions and precautions reviewed  pt to continue lovenox 40mg sq daily for 6 weeks

## 2024-01-22 ENCOUNTER — APPOINTMENT (OUTPATIENT)
Dept: OBGYN | Facility: CLINIC | Age: 34
End: 2024-01-22

## 2024-01-24 ENCOUNTER — APPOINTMENT (OUTPATIENT)
Dept: OBGYN | Facility: CLINIC | Age: 34
End: 2024-01-24
Payer: MEDICAID

## 2024-01-24 ENCOUNTER — APPOINTMENT (OUTPATIENT)
Dept: ANTEPARTUM | Facility: CLINIC | Age: 34
End: 2024-01-24

## 2024-01-24 VITALS
DIASTOLIC BLOOD PRESSURE: 72 MMHG | SYSTOLIC BLOOD PRESSURE: 110 MMHG | HEIGHT: 59 IN | WEIGHT: 136 LBS | BODY MASS INDEX: 27.42 KG/M2

## 2024-01-24 DIAGNOSIS — Z48.89 ENCOUNTER FOR OTHER SPECIFIED SURGICAL AFTERCARE: ICD-10-CM

## 2024-01-24 PROCEDURE — 99212 OFFICE O/P EST SF 10 MIN: CPT | Mod: TH,25

## 2024-01-26 DIAGNOSIS — Z3A.36 36 WEEKS GESTATION OF PREGNANCY: ICD-10-CM

## 2024-01-26 DIAGNOSIS — Z86.718 PERSONAL HISTORY OF OTHER VENOUS THROMBOSIS AND EMBOLISM: ICD-10-CM

## 2024-01-26 DIAGNOSIS — Q21.0 VENTRICULAR SEPTAL DEFECT: ICD-10-CM

## 2024-01-26 DIAGNOSIS — O36.8130 DECREASED FETAL MOVEMENTS, THIRD TRIMESTER, NOT APPLICABLE OR UNSPECIFIED: ICD-10-CM

## 2024-01-26 DIAGNOSIS — O36.5930 MATERNAL CARE FOR OTHER KNOWN OR SUSPECTED POOR FETAL GROWTH, THIRD TRIMESTER, NOT APPLICABLE OR UNSPECIFIED: ICD-10-CM

## 2024-01-26 DIAGNOSIS — D68.61 ANTIPHOSPHOLIPID SYNDROME: ICD-10-CM

## 2024-01-26 DIAGNOSIS — Z86.16 PERSONAL HISTORY OF COVID-19: ICD-10-CM

## 2024-01-26 DIAGNOSIS — Z28.09 IMMUNIZATION NOT CARRIED OUT BECAUSE OF OTHER CONTRAINDICATION: ICD-10-CM

## 2024-01-26 DIAGNOSIS — Z79.01 LONG TERM (CURRENT) USE OF ANTICOAGULANTS: ICD-10-CM

## 2024-01-26 DIAGNOSIS — M32.9 SYSTEMIC LUPUS ERYTHEMATOSUS, UNSPECIFIED: ICD-10-CM

## 2024-01-26 DIAGNOSIS — Z79.82 LONG TERM (CURRENT) USE OF ASPIRIN: ICD-10-CM

## 2024-01-29 ENCOUNTER — APPOINTMENT (OUTPATIENT)
Dept: OBGYN | Facility: CLINIC | Age: 34
End: 2024-01-29

## 2024-01-31 ENCOUNTER — APPOINTMENT (OUTPATIENT)
Dept: OBGYN | Facility: CLINIC | Age: 34
End: 2024-01-31
Payer: MEDICAID

## 2024-01-31 ENCOUNTER — APPOINTMENT (OUTPATIENT)
Dept: ANTEPARTUM | Facility: CLINIC | Age: 34
End: 2024-01-31

## 2024-01-31 PROCEDURE — 99212 OFFICE O/P EST SF 10 MIN: CPT | Mod: TH,25

## 2024-02-01 PROBLEM — Z86.718 HISTORY OF BLOOD CLOTS: Status: ACTIVE | Noted: 2023-09-25

## 2024-02-01 PROBLEM — Q21.0 VSD (VENTRICULAR SEPTAL DEFECT), MUSCULAR: Status: ACTIVE | Noted: 2023-09-25

## 2024-02-01 NOTE — ASSESSMENT
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 [facility] Card[facility]/Vascular Cardiology -[facility] Telephone # [phone]

## 2024-02-01 NOTE — PHYSICAL EXAM

## 2024-02-01 NOTE — REASON FOR VISIT
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 patient received anesthesia. Delivery occurred at [place].   #2 (): Vaginal delivery of 7 boy, at 40 weeks GA . patient received anesthesia. 2 Pregnancies terminated.  Notes no prior issues with arterial thrombosis with prior pregnancies, no current complaints of chest pain or shortness of breath, no lower extremity edema, exertional symptoms. No orthopnea or PND  No prior hx of preeclampsia  cardiac CTA done shows hx of VSD but TTE done shows no evidence of VSD and possibly small with no evidence of RV dysfunction or dilation  Currently on Lovenox 60mg SQ BID and tolerating well with no bleeding episodes  Current Pregnancy: LMP: 23. MATT: 24.

## 2024-02-02 RX ORDER — ENOXAPARIN SODIUM 40 MG/.4ML
40 INJECTION, SOLUTION SUBCUTANEOUS
Qty: 120 | Refills: 5 | Status: ACTIVE | COMMUNITY
Start: 2023-07-19 | End: 1900-01-01

## 2024-02-02 RX ORDER — HYDROXYCHLOROQUINE SULFATE 200 MG/1
200 TABLET, FILM COATED ORAL
Qty: 180 | Refills: 2 | Status: ACTIVE | COMMUNITY
Start: 2018-09-23 | End: 1900-01-01

## 2024-02-02 RX ORDER — ASPIRIN ENTERIC COATED TABLETS 81 MG 81 MG/1
81 TABLET, DELAYED RELEASE ORAL
Qty: 90 | Refills: 3 | Status: ACTIVE | COMMUNITY
Start: 2022-04-20 | End: 1900-01-01

## 2024-02-05 ENCOUNTER — APPOINTMENT (OUTPATIENT)
Dept: OBGYN | Facility: CLINIC | Age: 34
End: 2024-02-05

## 2024-02-07 ENCOUNTER — APPOINTMENT (OUTPATIENT)
Dept: ANTEPARTUM | Facility: CLINIC | Age: 34
End: 2024-02-07

## 2024-02-09 LAB — SURGICAL PATHOLOGY STUDY: SIGNIFICANT CHANGE UP

## 2024-02-20 DIAGNOSIS — M32.9 OTHER SPECIFIED DISEASES AND CONDITIONS COMPLICATING PREGNANCY: ICD-10-CM

## 2024-02-20 DIAGNOSIS — O99.891 OTHER SPECIFIED DISEASES AND CONDITIONS COMPLICATING PREGNANCY: ICD-10-CM

## 2024-02-23 ENCOUNTER — LABORATORY RESULT (OUTPATIENT)
Age: 34
End: 2024-02-23

## 2024-02-27 LAB
ALBUMIN SERPL ELPH-MCNC: 4.1 G/DL
ALP BLD-CCNC: 77 U/L
ALT SERPL-CCNC: 40 U/L
ANION GAP SERPL CALC-SCNC: 9 MMOL/L
APPEARANCE: CLEAR
AST SERPL-CCNC: 23 U/L
BILIRUB SERPL-MCNC: 0.3 MG/DL
BILIRUBIN URINE: NEGATIVE
BLOOD URINE: ABNORMAL
BUN SERPL-MCNC: 9 MG/DL
C3 SERPL-MCNC: 103 MG/DL
C4 SERPL-MCNC: 22 MG/DL
CALCIUM SERPL-MCNC: 8.9 MG/DL
CHLORIDE SERPL-SCNC: 106 MMOL/L
CO2 SERPL-SCNC: 28 MMOL/L
COLOR: NORMAL
CREAT SERPL-MCNC: 0.72 MG/DL
CREAT SPEC-SCNC: 225 MG/DL
CREAT/PROT UR: 0.1 RATIO
CRP SERPL-MCNC: <3 MG/L
DSDNA AB SER-ACNC: 62 IU/ML
EGFR: 113 ML/MIN/1.73M2
ERYTHROCYTE [SEDIMENTATION RATE] IN BLOOD BY WESTERGREN METHOD: 8 MM/HR
GLUCOSE QUALITATIVE U: NEGATIVE
GLUCOSE SERPL-MCNC: 86 MG/DL
HCT VFR BLD CALC: 37.7 %
HGB BLD-MCNC: 13 G/DL
KETONES URINE: NEGATIVE
LEUKOCYTE ESTERASE URINE: NEGATIVE
MCHC RBC-ENTMCNC: 30 PG
MCHC RBC-ENTMCNC: 34.5 GM/DL
MCV RBC AUTO: 86.9 FL
NITRITE URINE: NEGATIVE
PH URINE: 6
PLATELET # BLD AUTO: 310 K/UL
POTASSIUM SERPL-SCNC: 3.9 MMOL/L
PROT SERPL-MCNC: 6.4 G/DL
PROT UR-MCNC: 20 MG/DL
PROTEIN URINE: ABNORMAL
RBC # BLD: 4.34 M/UL
RBC # FLD: 12.5 %
SODIUM SERPL-SCNC: 143 MMOL/L
SPECIFIC GRAVITY URINE: >=1.03
UROBILINOGEN URINE: NORMAL
WBC # FLD AUTO: 7.04 K/UL

## 2024-02-28 ENCOUNTER — APPOINTMENT (OUTPATIENT)
Dept: OBGYN | Facility: CLINIC | Age: 34
End: 2024-02-28
Payer: MEDICAID

## 2024-02-28 DIAGNOSIS — Z30.09 ENCOUNTER FOR OTHER GENERAL COUNSELING AND ADVICE ON CONTRACEPTION: ICD-10-CM

## 2024-02-28 PROCEDURE — 99212 OFFICE O/P EST SF 10 MIN: CPT | Mod: TH,25

## 2024-02-28 RX ORDER — MISOPROSTOL 200 UG/1
200 TABLET ORAL
Qty: 2 | Refills: 0 | Status: ACTIVE | COMMUNITY
Start: 2024-02-28 | End: 1900-01-01

## 2024-02-28 NOTE — HISTORY OF PRESENT ILLNESS
[Postpartum Follow Up] : postpartum follow up [Complications:___] : no complications [Repeat C/S] : delivered by  section (repeat) [Breastfeeding] : not currently nursing [S/Sx PP Depression] : no signs/symptoms of postpartum depression [Clean/Dry/Intact] : clean, dry and intact [Erythema] : not erythematous [Back to Normal] : is back to normal in size [Mild] : mild vaginal bleeding [Normal] : the vagina was normal [Cervix Sample Taken] : cervical sample not taken for a Pap smear [Examination Of The Breasts] : breasts are normal [Doing Well] : is doing well [No Sign of Infection] : is showing no signs of infection [Excellent Pain Control] : has excellent pain control [None] : None [FreeTextEntry8] : Patient doing well.  Patient with history of blood clot and lupus and has completed her Lovenox treatment.  Patient to follow-up with her rheumatologist [de-identified] : Incision well-healed, abdomen is soft and nontender [de-identified] : Contraceptive counseling done.  Patient wants a ParaGard IUD.  Risk benefits and alternatives reviewed patient to schedule.  Otherwise follow-up in 6 months for annual exam and Pap smear

## 2024-02-29 ENCOUNTER — APPOINTMENT (OUTPATIENT)
Dept: RHEUMATOLOGY | Facility: CLINIC | Age: 34
End: 2024-02-29
Payer: MEDICAID

## 2024-02-29 VITALS
SYSTOLIC BLOOD PRESSURE: 105 MMHG | WEIGHT: 136 LBS | HEIGHT: 59 IN | TEMPERATURE: 98.1 F | RESPIRATION RATE: 16 BRPM | OXYGEN SATURATION: 98 % | BODY MASS INDEX: 27.42 KG/M2 | HEART RATE: 81 BPM | DIASTOLIC BLOOD PRESSURE: 70 MMHG

## 2024-02-29 PROCEDURE — G2211 COMPLEX E/M VISIT ADD ON: CPT | Mod: NC,1L

## 2024-02-29 PROCEDURE — 99215 OFFICE O/P EST HI 40 MIN: CPT

## 2024-02-29 NOTE — HISTORY OF PRESENT ILLNESS
[___ Month(s) Ago] : [unfilled] month(s) ago [None] : The patient is currently asymptomatic [FreeTextEntry1] : Bilateral bilateral leg pain and  feet burning pain with skin redness  since 2018, seen at the ER several times, and was told to have coxsackie virus\par  Now it has spread to her right hand with swelling and bright red rash and severe pain\par  Last Friday she had blood work results with positive DAMON and DsDNA - > 300. She was since then seen at the ER at [place] and started on prednisone 40 mg with significant improvement on her symptoms. \par  Weight loss 30 lbs intentional over the last 9 months\par   - abortions - 1st pregnancy with delivery at 36 weeks - no issues\par  tingling over the finger tips that is intermittent\par  \par   [FreeTextEntry3] : 2018 [FreeTextEntry7] : DsDNA [FreeTextEntry4] : hydroxychloroquine - coumadin [FreeTextEntry6] : APS with right arm arterial occlusion [Skin Lesions] : no lesions [Muscle Weakness] : no muscle weakness

## 2024-02-29 NOTE — ASSESSMENT
[FreeTextEntry1] : 1. 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 3. headaches - twice a week - has not seen neuro 4. post covid  - recovered fully - 5. weakness -no weakness on exam today 6. chest pain - referral to cardiology - normal echo and EKG 7. GI discomfort - referral to gastroenterology 8. Urgent  after decreased fetal movement with bradycardia - delivery at 36 weeks - patient was only doing lovenox once a day - not on contraceptive - but planning for IUD on 2024 9. memory loss - worsening - forgetting things and place - referral to neuro for further assessment.   I reviewed previous labs results with patients. labs done with low titer DsDNA -  normal CBC/CMP/ERS/CRP Diagnosis and Prognosis discussed Continue with current medications medications refilled education provided on APS  and  risk of clotting F/u 1 month

## 2024-02-29 NOTE — PHYSICAL EXAM
[General Appearance - Alert] : alert [General Appearance - In No Acute Distress] : in no acute distress [Oropharynx] : the oropharynx was normal [Outer Ear] : the ears and nose were normal in appearance [Neck Cervical Mass (___cm)] : no neck mass was observed [Neck Appearance] : the appearance of the neck was normal [Jugular Venous Distention Increased] : there was no jugular-venous distention [Thyroid Diffuse Enlargement] : the thyroid was not enlarged [Thyroid Nodule] : there were no palpable thyroid nodules [Auscultation Breath Sounds / Voice Sounds] : lungs were clear to auscultation bilaterally [Heart Rate And Rhythm] : heart rate was normal and rhythm regular [Heart Sounds] : normal S1 and S2 [Murmurs] : no murmurs [Heart Sounds Gallop] : no gallops [Heart Sounds Pericardial Friction Rub] : no pericardial rub [Bowel Sounds] : normal bowel sounds [Abdomen Soft] : soft [Abdomen Tenderness] : non-tender [Abdomen Mass (___ Cm)] : no abdominal mass palpated [Cervical Lymph Nodes Enlarged Anterior Bilaterally] : anterior cervical [Cervical Lymph Nodes Enlarged Posterior Bilaterally] : posterior cervical [Supraclavicular Lymph Nodes Enlarged Bilaterally] : supraclavicular [No CVA Tenderness] : no ~M costovertebral angle tenderness [No Spinal Tenderness] : no spinal tenderness [Abnormal Walk] : normal gait [Musculoskeletal - Swelling] : no joint swelling seen [Nail Clubbing] : no clubbing  or cyanosis of the fingernails [Motor Tone] : muscle strength and tone were normal [Skin Turgor] : normal skin turgor [Skin Color & Pigmentation] : normal skin color and pigmentation [] : no rash [Oriented To Time, Place, And Person] : oriented to person, place, and time [Impaired Insight] : insight and judgment were intact [Affect] : the affect was normal [FreeTextEntry1] : no rashes

## 2024-03-04 ENCOUNTER — APPOINTMENT (OUTPATIENT)
Dept: NEUROLOGY | Facility: CLINIC | Age: 34
End: 2024-03-04
Payer: MEDICAID

## 2024-03-04 VITALS
SYSTOLIC BLOOD PRESSURE: 108 MMHG | HEIGHT: 59 IN | WEIGHT: 136 LBS | BODY MASS INDEX: 27.42 KG/M2 | DIASTOLIC BLOOD PRESSURE: 70 MMHG

## 2024-03-04 DIAGNOSIS — R41.3 OTHER AMNESIA: ICD-10-CM

## 2024-03-04 PROCEDURE — 99204 OFFICE O/P NEW MOD 45 MIN: CPT

## 2024-03-04 NOTE — PHYSICAL EXAM
[General Appearance - Alert] : alert [General Appearance - Well Nourished] : well nourished [General Appearance - In No Acute Distress] : in no acute distress [General Appearance - Well Developed] : well developed [Person] : oriented to person [Place] : oriented to place [Time] : oriented to time [Remote Intact] : remote memory intact [Span Intact] : the attention span was normal [Registration Intact] : recent registration memory intact [Concentration Intact] : normal concentrating ability [Visual Intact] : visual attention was ~T not ~L decreased [Naming Objects] : no difficulty naming common objects [Repeating Phrases] : no difficulty repeating a phrase [Fluency] : fluency intact [Comprehension] : comprehension intact [Current Events] : adequate knowledge of current events [Past History] : adequate knowledge of personal past history [Cranial Nerves Optic (II)] : visual acuity intact bilaterally,  visual fields full to confrontation, pupils equal round and reactive to light [Cranial Nerves Oculomotor (III)] : extraocular motion intact [Cranial Nerves Facial (VII)] : face symmetrical [Cranial Nerves Trigeminal (V)] : facial sensation intact symmetrically [Cranial Nerves Glossopharyngeal (IX)] : tongue and palate midline [Cranial Nerves Vestibulocochlear (VIII)] : hearing was intact bilaterally [Cranial Nerves Accessory (XI - Cranial And Spinal)] : head turning and shoulder shrug symmetric [Cranial Nerves Hypoglossal (XII)] : there was no tongue deviation with protrusion [Motor Tone] : muscle tone was normal in all four extremities [Motor Strength] : muscle strength was normal in all four extremities [Involuntary Movements] : no involuntary movements were seen [No Muscle Atrophy] : normal bulk in all four extremities [Paresis Pronator Drift Right-Sided] : no pronator drift on the right [Motor Strength Upper Extremities Bilaterally] : strength was normal in both upper extremities [Paresis Pronator Drift Left-Sided] : no pronator drift on the left [Motor Strength Lower Extremities Bilaterally] : strength was normal in both lower extremities [Sensation Pain / Temperature Decrease] : pain and temperature was intact [Sensation Tactile Decrease] : light touch was intact [Sensation Vibration Decrease] : vibration was intact [Proprioception] : proprioception was intact [Abnormal Walk] : normal gait [Balance] : balance was intact [Tremor] : no tremor present [Coordination - Dysmetria Impaired Finger-to-Nose Bilateral] : not present [2+] : Patella left 2+ [Sclera] : the sclera and conjunctiva were normal [PERRL With Normal Accommodation] : pupils were equal in size, round, reactive to light, with normal accommodation [Extraocular Movements] : extraocular movements were intact [No APD] : no afferent pupillary defect [Full Visual Field] : full visual field [No VINAY] : no internuclear ophthalmoplegia

## 2024-03-04 NOTE — ASSESSMENT
[FreeTextEntry1] : This is a 33-year-old woman with some mild issues with memory.  This is probably more due to either distraction or attention issue rather than a neurodegenerative process.  However she does have lupus together with an antiphospholipid antibody syndrome I will check an MRI of her brain to ensure that there is no cerebritis or other issues that may be causing her memory problem.  I will see her back following the MRI if indicated.

## 2024-03-04 NOTE — HISTORY OF PRESENT ILLNESS
[FreeTextEntry1] : Initial office visit March 4, 2024: This is a 33-year-old woman who has history of lupus and antiphospholipid antibody syndrome.  She is here with complaint of memory loss.  She states that she will suddenly forget what she did the week before.  Occasionally she may repeat a story to her sister.  She is not getting lost or leaving the oven on.  She is currently on maternity leave with a 1-1/2-month old baby but prior to that was not having any problems at work.  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  She is here today for neurologic evaluation of this memory issue.

## 2024-03-04 NOTE — CONSULT LETTER
[Dear  ___] : Dear  [unfilled], [Consult Letter:] : I had the pleasure of evaluating your patient, [unfilled]. [Please see my note below.] : Please see my note below. [Consult Closing:] : Thank you very much for allowing me to participate in the care of this patient.  If you have any questions, please do not hesitate to contact me. [Sincerely,] : Sincerely, [FreeTextEntry3] : Mehul Read M.D., Ph.D. DPN-N [facility] [place] Director, Division of Neurology Director, Comprehensive Stroke [facility]

## 2024-03-10 ENCOUNTER — OFFICE (OUTPATIENT)
Dept: URBAN - METROPOLITAN AREA CLINIC 12 | Facility: CLINIC | Age: 34
Setting detail: OPHTHALMOLOGY
End: 2024-03-10
Payer: COMMERCIAL

## 2024-03-10 DIAGNOSIS — H04.123: ICD-10-CM

## 2024-03-10 DIAGNOSIS — H40.013: ICD-10-CM

## 2024-03-10 PROBLEM — H16.223 DRY EYE SYNDROME K SICCA; BOTH EYES: Status: ACTIVE | Noted: 2024-03-10

## 2024-03-10 PROBLEM — Z79.899 PLAQUENIL/MEDICATION: Status: ACTIVE | Noted: 2024-03-10

## 2024-03-10 PROBLEM — L93.0 LUPUS ERYTHEMATOSUS, NOS: Status: ACTIVE | Noted: 2024-03-10

## 2024-03-10 PROCEDURE — 92004 COMPRE OPH EXAM NEW PT 1/>: CPT | Performed by: STUDENT IN AN ORGANIZED HEALTH CARE EDUCATION/TRAINING PROGRAM

## 2024-03-10 PROCEDURE — 92250 FUNDUS PHOTOGRAPHY W/I&R: CPT | Performed by: STUDENT IN AN ORGANIZED HEALTH CARE EDUCATION/TRAINING PROGRAM

## 2024-03-10 ASSESSMENT — REFRACTION_MANIFEST
OD_CYLINDER: +0.50
OS_VA1: 20/20
OD_CYLINDER: +0.25
OD_AXIS: 170
OD_SPHERE: -5.25
OD_VA1: 20/20
OS_VA1: 20/20
OD_SPHERE: -5.25
OD_AXIS: 165
OS_SPHERE: -4.50
OS_SPHERE: -4.75
OS_CYLINDER: SPH
OD_VA1: 20/20
OU_VA: 20/20

## 2024-03-10 ASSESSMENT — REFRACTION_CURRENTRX
OD_VPRISM_DIRECTION: SV
OD_SPHERE: -5.25
OD_AXIS: 062
OS_CYLINDER: SPHERE
OS_OVR_VA: 20/
OS_AXIS: 000
OD_CYLINDER: +0.25
OS_SPHERE: -4.75
OD_OVR_VA: 20/
OS_VPRISM_DIRECTION: SV

## 2024-03-10 ASSESSMENT — SPHEQUIV_DERIVED
OD_SPHEQUIV: -5
OD_SPHEQUIV: -5.125

## 2024-03-18 ENCOUNTER — OUTPATIENT (OUTPATIENT)
Dept: OUTPATIENT SERVICES | Facility: HOSPITAL | Age: 34
LOS: 1 days | End: 2024-03-18
Payer: MEDICAID

## 2024-03-18 ENCOUNTER — APPOINTMENT (OUTPATIENT)
Dept: MRI IMAGING | Facility: CLINIC | Age: 34
End: 2024-03-18
Payer: MEDICAID

## 2024-03-18 DIAGNOSIS — D68.61 ANTIPHOSPHOLIPID SYNDROME: ICD-10-CM

## 2024-03-18 DIAGNOSIS — M32.9 SYSTEMIC LUPUS ERYTHEMATOSUS, UNSPECIFIED: ICD-10-CM

## 2024-03-18 DIAGNOSIS — R41.3 OTHER AMNESIA: ICD-10-CM

## 2024-03-18 PROCEDURE — 70551 MRI BRAIN STEM W/O DYE: CPT | Mod: 26

## 2024-03-18 PROCEDURE — 70551 MRI BRAIN STEM W/O DYE: CPT

## 2024-03-27 ENCOUNTER — APPOINTMENT (OUTPATIENT)
Dept: OBGYN | Facility: CLINIC | Age: 34
End: 2024-03-27
Payer: MEDICAID

## 2024-03-27 VITALS
HEIGHT: 59 IN | BODY MASS INDEX: 27.21 KG/M2 | DIASTOLIC BLOOD PRESSURE: 70 MMHG | SYSTOLIC BLOOD PRESSURE: 112 MMHG | WEIGHT: 135 LBS

## 2024-03-27 DIAGNOSIS — Z30.430 ENCOUNTER FOR INSERTION OF INTRAUTERINE CONTRACEPTIVE DEVICE: ICD-10-CM

## 2024-03-27 PROCEDURE — 64435 NJX AA&/STRD PARACRV NRV: CPT | Mod: 59

## 2024-03-27 PROCEDURE — 58300 INSERT INTRAUTERINE DEVICE: CPT

## 2024-03-27 PROCEDURE — 81025 URINE PREGNANCY TEST: CPT

## 2024-03-27 NOTE — PROCEDURE
[IUD Placement] : intrauterine device (IUD) placement [Time out performed] : Pre-procedure time out performed.  Patient's name, date of birth and procedure confirmed. [Consent Obtained] : Consent obtained [Prevention of Pregnancy] : prevention of pregnancy [Risks] : risks [Benefits] : benefits [Alternatives] : alternatives [Infection] : infection [Patient] : patient [Bleeding] : bleeding [Pain] : pain [Expulsion] : expulsion [Failure] : failure [Uterine Perforation] : uterine perforation [Neg Pregnancy Test] : negative pregnancy test [Cytotec] : Cytotec [Betadine] : Betadine [Easy Passage] : Easy passage [Tenaculum] : Tenaculum [Sounded to ___ cm] : sounded to [unfilled] ~Ucm [ParaGard IUD] : ParaGard IUD [Tolerated Well] : Patient tolerated the procedure well [No Complications] : No complications [None] : None [de-identified] : [birth year] [de-identified] : Instructions and precautions reviewed follow-up in 1 month

## 2024-03-28 LAB
HCG UR QL: NEGATIVE
QUALITY CONTROL: YES

## 2024-03-29 DIAGNOSIS — E66.9 OBESITY, UNSPECIFIED: ICD-10-CM

## 2024-03-29 RX ORDER — PHENTERMINE HYDROCHLORIDE 15 MG/1
15 CAPSULE ORAL
Qty: 30 | Refills: 3 | Status: ACTIVE | COMMUNITY
Start: 2024-03-29 | End: 1900-01-01

## 2024-04-24 ENCOUNTER — APPOINTMENT (OUTPATIENT)
Dept: OBGYN | Facility: CLINIC | Age: 34
End: 2024-04-24
Payer: MEDICAID

## 2024-04-24 VITALS
DIASTOLIC BLOOD PRESSURE: 66 MMHG | WEIGHT: 135 LBS | BODY MASS INDEX: 27.21 KG/M2 | HEIGHT: 59 IN | SYSTOLIC BLOOD PRESSURE: 110 MMHG

## 2024-04-24 DIAGNOSIS — B37.31 ACUTE CANDIDIASIS OF VULVA AND VAGINA: ICD-10-CM

## 2024-04-24 DIAGNOSIS — Z97.5 PRESENCE OF (INTRAUTERINE) CONTRACEPTIVE DEVICE: ICD-10-CM

## 2024-04-24 PROCEDURE — 76830 TRANSVAGINAL US NON-OB: CPT

## 2024-04-24 PROCEDURE — 99213 OFFICE O/P EST LOW 20 MIN: CPT | Mod: 25

## 2024-04-24 RX ORDER — FLUCONAZOLE 150 MG/1
150 TABLET ORAL
Qty: 1 | Refills: 1 | Status: ACTIVE | COMMUNITY
Start: 2024-04-24 | End: 1900-01-01

## 2024-04-24 NOTE — HISTORY OF PRESENT ILLNESS
[FreeTextEntry1] : Patient is a 33-year-old female presents for 1 month follow-up status post insertion of a ParaGard IUD.  Patient has complaints of yeast infection.  Will prescribe Diflucan.  Otherwise patient has no complaints

## 2024-04-24 NOTE — PHYSICAL EXAM
[Labia Majora] : normal [Labia Minora] : normal [IUD String] : an IUD string was noted [Normal] : normal [Uterine Adnexae] : normal [FreeTextEntry4] : Positive Candida

## 2024-04-24 NOTE — PROCEDURE
[Locate IUD] : locate IUD [Transvaginal Ultrasound] : transvaginal ultrasound [FreeTextEntry3] : Transvaginal pelvic ultrasound performed: A normal-appearing uterus seen with the IUD in proper position and placement within the endometrial cavity.  Adnexa within normal limits

## 2024-04-24 NOTE — DISCUSSION/SUMMARY
[FreeTextEntry1] : Impression: IUD in place, Candida vaginitis  Rx: Diflucan 150 mg  Instructions and precautions reviewed, follow-up as needed for routine GYN care

## 2024-04-25 ENCOUNTER — NON-APPOINTMENT (OUTPATIENT)
Age: 34
End: 2024-04-25

## 2024-04-30 ENCOUNTER — LABORATORY RESULT (OUTPATIENT)
Age: 34
End: 2024-04-30

## 2024-04-30 ENCOUNTER — APPOINTMENT (OUTPATIENT)
Dept: RHEUMATOLOGY | Facility: CLINIC | Age: 34
End: 2024-04-30
Payer: MEDICAID

## 2024-04-30 VITALS
BODY MASS INDEX: 26.41 KG/M2 | OXYGEN SATURATION: 98 % | RESPIRATION RATE: 16 BRPM | DIASTOLIC BLOOD PRESSURE: 79 MMHG | HEART RATE: 77 BPM | WEIGHT: 131 LBS | SYSTOLIC BLOOD PRESSURE: 112 MMHG | HEIGHT: 59 IN

## 2024-04-30 DIAGNOSIS — D68.61 ANTIPHOSPHOLIPID SYNDROME: ICD-10-CM

## 2024-04-30 DIAGNOSIS — M32.9 SYSTEMIC LUPUS ERYTHEMATOSUS, UNSPECIFIED: ICD-10-CM

## 2024-04-30 DIAGNOSIS — R23.3 SPONTANEOUS ECCHYMOSES: ICD-10-CM

## 2024-04-30 DIAGNOSIS — K62.5 HEMORRHAGE OF ANUS AND RECTUM: ICD-10-CM

## 2024-04-30 DIAGNOSIS — L81.4 OTHER MELANIN HYPERPIGMENTATION: ICD-10-CM

## 2024-04-30 PROCEDURE — 99214 OFFICE O/P EST MOD 30 MIN: CPT

## 2024-04-30 PROCEDURE — G2211 COMPLEX E/M VISIT ADD ON: CPT | Mod: NC,1L

## 2024-04-30 NOTE — ASSESSMENT
[FreeTextEntry1] : 1. 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 - taking it daily  3. headaches - twice a week - has not seen neuro 4. post covid  - recovered fully - 5. weakness -no weakness on exam today 6. chest pain - no new episodes 7. GI discomfort - constipation - with rectal bleeding - needs to see GI asap - start magnesium at bed time 8. Urgent  after decreased fetal movement with bradycardia - delivery at 36 weeks - patient was only doing lovenox once a day - IUD in place 9. memory loss - worsening - forgetting things and place - referral to neuro for further assessment.  10. skin darkening - referral to dermatology  I reviewed previous labs results with patients. labs today Diagnosis and Prognosis discussed Continue with current medications medications refilled education provided on APS  and  risk of clotting F/u 3 months

## 2024-04-30 NOTE — PHYSICAL EXAM
[General Appearance - Alert] : alert [General Appearance - In No Acute Distress] : in no acute distress [Outer Ear] : the ears and nose were normal in appearance [Oropharynx] : the oropharynx was normal [Neck Appearance] : the appearance of the neck was normal [Neck Cervical Mass (___cm)] : no neck mass was observed [Jugular Venous Distention Increased] : there was no jugular-venous distention [Thyroid Diffuse Enlargement] : the thyroid was not enlarged [Thyroid Nodule] : there were no palpable thyroid nodules [Auscultation Breath Sounds / Voice Sounds] : lungs were clear to auscultation bilaterally [Heart Rate And Rhythm] : heart rate was normal and rhythm regular [Heart Sounds] : normal S1 and S2 [Heart Sounds Gallop] : no gallops [Murmurs] : no murmurs [Heart Sounds Pericardial Friction Rub] : no pericardial rub [Bowel Sounds] : normal bowel sounds [Abdomen Soft] : soft [Abdomen Tenderness] : non-tender [Abdomen Mass (___ Cm)] : no abdominal mass palpated [Cervical Lymph Nodes Enlarged Posterior Bilaterally] : posterior cervical [Cervical Lymph Nodes Enlarged Anterior Bilaterally] : anterior cervical [Supraclavicular Lymph Nodes Enlarged Bilaterally] : supraclavicular [No CVA Tenderness] : no ~M costovertebral angle tenderness [No Spinal Tenderness] : no spinal tenderness [Skin Color & Pigmentation] : normal skin color and pigmentation [Skin Turgor] : normal skin turgor [] : no rash [Oriented To Time, Place, And Person] : oriented to person, place, and time [Impaired Insight] : insight and judgment were intact [Affect] : the affect was normal [Abnormal Walk] : normal gait [Nail Clubbing] : no clubbing  or cyanosis of the fingernails [Musculoskeletal - Swelling] : no joint swelling seen [Motor Tone] : muscle strength and tone were normal [FreeTextEntry1] : all joints with full ROM - no synovitis

## 2024-04-30 NOTE — HISTORY OF PRESENT ILLNESS
[___ Month(s) Ago] : [unfilled] month(s) ago [FreeTextEntry1] : still on ASA 81 mg and HCQ has some bruising over the legs ongoing hemorrhoids - with bleeding - seen at the urgent care - referred to GI reports being constipated - moves her bowel once a week -  [FreeTextEntry3] : 2018 [FreeTextEntry7] : DsDNA [FreeTextEntry4] : hydroxychloroquine - coumadin [FreeTextEntry6] : APS with right arm arterial occlusion [Skin Lesions] : no lesions [Muscle Weakness] : no muscle weakness [None] : The patient is currently asymptomatic

## 2024-05-02 LAB
ALBUMIN SERPL ELPH-MCNC: 4.4 G/DL
ALP BLD-CCNC: 61 U/L
ALT SERPL-CCNC: 15 U/L
ANION GAP SERPL CALC-SCNC: 12 MMOL/L
APPEARANCE: CLEAR
AST SERPL-CCNC: 18 U/L
BASOPHILS # BLD AUTO: 0.04 K/UL
BASOPHILS NFR BLD AUTO: 0.6 %
BILIRUB SERPL-MCNC: 0.3 MG/DL
BILIRUBIN URINE: NEGATIVE
BLOOD URINE: ABNORMAL
BUN SERPL-MCNC: 8 MG/DL
C3 SERPL-MCNC: 106 MG/DL
C4 SERPL-MCNC: 22 MG/DL
CALCIUM SERPL-MCNC: 9.1 MG/DL
CHLORIDE SERPL-SCNC: 103 MMOL/L
CO2 SERPL-SCNC: 25 MMOL/L
COLOR: YELLOW
CREAT SERPL-MCNC: 0.7 MG/DL
CREAT SPEC-SCNC: 35 MG/DL
CREAT/PROT UR: 0.7 RATIO
CRP SERPL-MCNC: <3 MG/L
DSDNA AB SER-ACNC: 63 IU/ML
EGFR: 117 ML/MIN/1.73M2
EOSINOPHIL # BLD AUTO: 0.05 K/UL
EOSINOPHIL NFR BLD AUTO: 0.8 %
ERYTHROCYTE [SEDIMENTATION RATE] IN BLOOD BY WESTERGREN METHOD: 2 MM/HR
GLUCOSE QUALITATIVE U: NEGATIVE MG/DL
GLUCOSE SERPL-MCNC: 78 MG/DL
HCT VFR BLD CALC: 40.5 %
HGB BLD-MCNC: 13.8 G/DL
IMM GRANULOCYTES NFR BLD AUTO: 0.3 %
INR PPP: 0.92 RATIO
KETONES URINE: NEGATIVE MG/DL
LEUKOCYTE ESTERASE URINE: ABNORMAL
LYMPHOCYTES # BLD AUTO: 2.87 K/UL
LYMPHOCYTES NFR BLD AUTO: 43.4 %
MAN DIFF?: NORMAL
MCHC RBC-ENTMCNC: 29.5 PG
MCHC RBC-ENTMCNC: 34.1 GM/DL
MCV RBC AUTO: 86.5 FL
MONOCYTES # BLD AUTO: 0.61 K/UL
MONOCYTES NFR BLD AUTO: 9.2 %
NEUTROPHILS # BLD AUTO: 3.02 K/UL
NEUTROPHILS NFR BLD AUTO: 45.7 %
NITRITE URINE: NEGATIVE
PH URINE: 6.5
PLATELET # BLD AUTO: 335 K/UL
POTASSIUM SERPL-SCNC: 4.4 MMOL/L
PROT SERPL-MCNC: 6.8 G/DL
PROT UR-MCNC: 23 MG/DL
PROTEIN URINE: 30 MG/DL
PT BLD: 10.4 SEC
RBC # BLD: 4.68 M/UL
RBC # FLD: 13.4 %
SODIUM SERPL-SCNC: 139 MMOL/L
SPECIFIC GRAVITY URINE: 1.01
UROBILINOGEN URINE: 0.2 MG/DL
WBC # FLD AUTO: 6.61 K/UL

## 2024-07-30 ENCOUNTER — APPOINTMENT (OUTPATIENT)
Dept: RHEUMATOLOGY | Facility: CLINIC | Age: 34
End: 2024-07-30
Payer: MEDICAID

## 2024-07-30 VITALS
HEIGHT: 59 IN | BODY MASS INDEX: 27.21 KG/M2 | HEART RATE: 76 BPM | WEIGHT: 135 LBS | SYSTOLIC BLOOD PRESSURE: 106 MMHG | DIASTOLIC BLOOD PRESSURE: 73 MMHG | OXYGEN SATURATION: 99 %

## 2024-07-30 DIAGNOSIS — M32.9 OTHER SPECIFIED DISEASES AND CONDITIONS COMPLICATING PREGNANCY: ICD-10-CM

## 2024-07-30 DIAGNOSIS — M54.50 LOW BACK PAIN, UNSPECIFIED: ICD-10-CM

## 2024-07-30 DIAGNOSIS — O99.891 OTHER SPECIFIED DISEASES AND CONDITIONS COMPLICATING PREGNANCY: ICD-10-CM

## 2024-07-30 DIAGNOSIS — D68.61 ANTIPHOSPHOLIPID SYNDROME: ICD-10-CM

## 2024-07-30 PROCEDURE — 99214 OFFICE O/P EST MOD 30 MIN: CPT

## 2024-07-30 PROCEDURE — G2211 COMPLEX E/M VISIT ADD ON: CPT | Mod: NC

## 2024-07-30 NOTE — HISTORY OF PRESENT ILLNESS
[___ Month(s) Ago] : [unfilled] month(s) ago [None] : The patient is currently asymptomatic [FreeTextEntry1] : still on ASA 81 mg and HCQ - not consistent reports some ongoing muscle aches over the thighs reports severe back pain now - worse with stiffness and muscle spasm has times where she has difficulty moving due to back pain no other complains no flares since the last visit   [FreeTextEntry3] : 2018 [FreeTextEntry7] : DsDNA [FreeTextEntry4] : hydroxychloroquine - coumadin [FreeTextEntry6] : APS with right arm arterial occlusion [Skin Lesions] : no lesions [Muscle Weakness] : no muscle weakness

## 2024-07-30 NOTE — ASSESSMENT
[FreeTextEntry1] : 1. 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 - taking it daily  - re-check US of the bilateral arms s/o 1 pregnancy with early placenta failure - pathology with micro-clots 3. headaches - twice a week - has not seen neuro 4. post covid 19 - January - recovered fully - 5. weakness -no weakness on exam today 6. chest pain - no new episodes 7. GI discomfort - constipation - with rectal bleeding - needs to see GI asap - start magnesium at bed time 8 memory loss - worsening - forgetting things and place - referral to neuro for further assessment.  9. low back pain - send for x-rays   I reviewed previous labs results with patients. labs today Diagnosis and Prognosis discussed Continue with current medications medications refilled F/u 3 months

## 2024-08-01 LAB
ALBUMIN SERPL ELPH-MCNC: 4 G/DL
ALP BLD-CCNC: 62 U/L
ALT SERPL-CCNC: 9 U/L
ANION GAP SERPL CALC-SCNC: 12 MMOL/L
AST SERPL-CCNC: 14 U/L
BASOPHILS # BLD AUTO: 0.04 K/UL
BASOPHILS NFR BLD AUTO: 0.7 %
BILIRUB SERPL-MCNC: 0.2 MG/DL
BUN SERPL-MCNC: 12 MG/DL
C3 SERPL-MCNC: 100 MG/DL
C4 SERPL-MCNC: 18 MG/DL
CALCIUM SERPL-MCNC: 9.1 MG/DL
CHLORIDE SERPL-SCNC: 103 MMOL/L
CO2 SERPL-SCNC: 24 MMOL/L
CREAT SERPL-MCNC: 0.64 MG/DL
CRP SERPL-MCNC: <3 MG/L
DSDNA AB SER-ACNC: 56 IU/ML
EGFR: 119 ML/MIN/1.73M2
ENA RNP AB SER IA-ACNC: <0.2 AL
ENA SM AB SER IA-ACNC: <0.2 AL
ENA SS-A AB SER IA-ACNC: <0.2 AL
ENA SS-B AB SER IA-ACNC: <0.2 AL
EOSINOPHIL # BLD AUTO: 0.08 K/UL
EOSINOPHIL NFR BLD AUTO: 1.3 %
ERYTHROCYTE [SEDIMENTATION RATE] IN BLOOD BY WESTERGREN METHOD: 2 MM/HR
GLUCOSE SERPL-MCNC: 79 MG/DL
HCT VFR BLD CALC: 37.9 %
HGB BLD-MCNC: 12.7 G/DL
IMM GRANULOCYTES NFR BLD AUTO: 0.2 %
LYMPHOCYTES # BLD AUTO: 2.29 K/UL
LYMPHOCYTES NFR BLD AUTO: 38.5 %
MAN DIFF?: NORMAL
MCHC RBC-ENTMCNC: 29.3 PG
MCHC RBC-ENTMCNC: 33.5 GM/DL
MCV RBC AUTO: 87.5 FL
MONOCYTES # BLD AUTO: 0.52 K/UL
MONOCYTES NFR BLD AUTO: 8.7 %
NEUTROPHILS # BLD AUTO: 3.01 K/UL
NEUTROPHILS NFR BLD AUTO: 50.6 %
PLATELET # BLD AUTO: 366 K/UL
POTASSIUM SERPL-SCNC: 4.5 MMOL/L
PROT SERPL-MCNC: 6.6 G/DL
RBC # BLD: 4.33 M/UL
RBC # FLD: 12.7 %
SODIUM SERPL-SCNC: 139 MMOL/L
WBC # FLD AUTO: 5.95 K/UL

## 2024-08-07 ENCOUNTER — APPOINTMENT (OUTPATIENT)
Dept: OBGYN | Facility: CLINIC | Age: 34
End: 2024-08-07

## 2024-08-13 RX ORDER — PREDNISONE 20 MG/1
20 TABLET ORAL
Qty: 30 | Refills: 1 | Status: ACTIVE | COMMUNITY
Start: 2024-08-13 | End: 1900-01-01

## 2024-08-15 ENCOUNTER — NON-APPOINTMENT (OUTPATIENT)
Age: 34
End: 2024-08-15

## 2024-08-15 ENCOUNTER — LABORATORY RESULT (OUTPATIENT)
Age: 34
End: 2024-08-15

## 2024-08-15 DIAGNOSIS — M32.9 OTHER SPECIFIED DISEASES AND CONDITIONS COMPLICATING PREGNANCY: ICD-10-CM

## 2024-08-15 DIAGNOSIS — O99.891 OTHER SPECIFIED DISEASES AND CONDITIONS COMPLICATING PREGNANCY: ICD-10-CM

## 2024-08-15 DIAGNOSIS — R53.83 OTHER MALAISE: ICD-10-CM

## 2024-08-15 DIAGNOSIS — R53.81 OTHER MALAISE: ICD-10-CM

## 2024-08-17 NOTE — DISCHARGE NOTE ADULT - THE PATIENT HAS RECEIVED WRITTEN DISCHARGE INSTRUCTIONS FOR WARFARIN/COUMADIN, INCLUDING THE WARFARIN/COUMADIN DISCHARGE BOOKLET, WHICH CONTAINS ALL OF THE INFORMATION LISTED BELOW.EPING
Verbal/written post procedure instructions were given to patient/caregiver./Instructed patient/caregiver regarding signs and symptoms of infection.
Statement Selected

## 2024-08-20 DIAGNOSIS — R80.9 PROTEINURIA, UNSPECIFIED: ICD-10-CM

## 2024-08-20 DIAGNOSIS — M79.10 MYALGIA, UNSPECIFIED SITE: ICD-10-CM

## 2024-08-20 LAB
ALBUMIN SERPL ELPH-MCNC: 4 G/DL
ALP BLD-CCNC: 60 U/L
ALT SERPL-CCNC: 10 U/L
ANION GAP SERPL CALC-SCNC: 15 MMOL/L
APPEARANCE: CLEAR
AST SERPL-CCNC: 14 U/L
BASOPHILS # BLD AUTO: 0.02 K/UL
BASOPHILS NFR BLD AUTO: 0.2 %
BILIRUB SERPL-MCNC: 0.2 MG/DL
BILIRUBIN URINE: NEGATIVE
BLOOD URINE: ABNORMAL
BUN SERPL-MCNC: 12 MG/DL
C3 SERPL-MCNC: 100 MG/DL
C4 SERPL-MCNC: 18 MG/DL
CALCIUM SERPL-MCNC: 9.1 MG/DL
CCP AB SER IA-ACNC: <8 U/ML
CHLORIDE SERPL-SCNC: 103 MMOL/L
CO2 SERPL-SCNC: 23 MMOL/L
COLOR: YELLOW
CREAT SERPL-MCNC: 0.67 MG/DL
CRP SERPL-MCNC: <3 MG/L
DSDNA AB SER-ACNC: 58 IU/ML
EGFR: 118 ML/MIN/1.73M2
EOSINOPHIL # BLD AUTO: 0 K/UL
EOSINOPHIL NFR BLD AUTO: 0 %
ERYTHROCYTE [SEDIMENTATION RATE] IN BLOOD BY WESTERGREN METHOD: 7 MM/HR
GLUCOSE QUALITATIVE U: NEGATIVE MG/DL
GLUCOSE SERPL-MCNC: 141 MG/DL
HCT VFR BLD CALC: 36.3 %
HGB BLD-MCNC: 12.4 G/DL
IMM GRANULOCYTES NFR BLD AUTO: 0.4 %
KETONES URINE: NEGATIVE MG/DL
LEUKOCYTE ESTERASE URINE: NEGATIVE
LYMPHOCYTES # BLD AUTO: 0.92 K/UL
LYMPHOCYTES NFR BLD AUTO: 8.9 %
MAN DIFF?: NORMAL
MCHC RBC-ENTMCNC: 29.2 PG
MCHC RBC-ENTMCNC: 34.2 GM/DL
MCV RBC AUTO: 85.6 FL
MONOCYTES # BLD AUTO: 0.36 K/UL
MONOCYTES NFR BLD AUTO: 3.5 %
NEUTROPHILS # BLD AUTO: 9.05 K/UL
NEUTROPHILS NFR BLD AUTO: 87 %
NITRITE URINE: NEGATIVE
PH URINE: 6
PLATELET # BLD AUTO: 315 K/UL
POTASSIUM SERPL-SCNC: 4.6 MMOL/L
PROT SERPL-MCNC: 6.5 G/DL
PROTEIN URINE: 300 MG/DL
RBC # BLD: 4.24 M/UL
RBC # FLD: 12.8 %
RF+CCP IGG SER-IMP: NEGATIVE
RHEUMATOID FACT SER QL: 10 IU/ML
SODIUM SERPL-SCNC: 140 MMOL/L
SPECIFIC GRAVITY URINE: 1.03
TSH SERPL-ACNC: 0.85 UIU/ML
UROBILINOGEN URINE: 1 MG/DL
WBC # FLD AUTO: 10.39 K/UL

## 2024-08-29 ENCOUNTER — NON-APPOINTMENT (OUTPATIENT)
Age: 34
End: 2024-08-29

## 2024-08-31 ENCOUNTER — LABORATORY RESULT (OUTPATIENT)
Age: 34
End: 2024-08-31

## 2024-08-31 LAB
CREAT SPEC-SCNC: 149 MG/DL
CREAT/PROT UR: 1.7 RATIO
PROT UR-MCNC: 252 MG/DL

## 2024-09-01 LAB
APPEARANCE: CLEAR
BILIRUBIN URINE: NEGATIVE
BLOOD URINE: ABNORMAL
COLOR: YELLOW
GLUCOSE QUALITATIVE U: NEGATIVE MG/DL
KETONES URINE: NEGATIVE MG/DL
LEUKOCYTE ESTERASE URINE: NEGATIVE
NITRITE URINE: NEGATIVE
PH URINE: 6
PROTEIN URINE: 300 MG/DL
SPECIFIC GRAVITY URINE: 1.02
UROBILINOGEN URINE: 1 MG/DL

## 2024-09-05 DIAGNOSIS — M32.14 GLOMERULAR DISEASE IN SYSTEMIC LUPUS ERYTHEMATOSUS: ICD-10-CM

## 2024-09-05 RX ORDER — MYCOPHENOLATE MOFETIL 200 MG/ML
200 POWDER, FOR SUSPENSION ORAL
Qty: 3 | Refills: 6 | Status: ACTIVE | COMMUNITY
Start: 2024-09-05 | End: 1900-01-01

## 2024-09-05 RX ORDER — PREDNISONE 20 MG/1
20 TABLET ORAL
Qty: 120 | Refills: 5 | Status: ACTIVE | COMMUNITY
Start: 2024-09-05 | End: 1900-01-01

## 2024-09-09 ENCOUNTER — TRANSCRIPTION ENCOUNTER (OUTPATIENT)
Age: 34
End: 2024-09-09

## 2024-09-09 ENCOUNTER — RX CHANGE (OUTPATIENT)
Age: 34
End: 2024-09-09

## 2024-09-10 RX ORDER — MYCOPHENOLATE MOFETIL 200 MG/ML
200 POWDER, FOR SUSPENSION ORAL
Qty: 3 | Refills: 6 | Status: ACTIVE | COMMUNITY
Start: 2024-09-10 | End: 1900-01-01

## 2024-09-18 ENCOUNTER — APPOINTMENT (OUTPATIENT)
Dept: OBGYN | Facility: CLINIC | Age: 34
End: 2024-09-18
Payer: MEDICAID

## 2024-09-18 VITALS
BODY MASS INDEX: 27.21 KG/M2 | HEIGHT: 59 IN | SYSTOLIC BLOOD PRESSURE: 100 MMHG | WEIGHT: 135 LBS | DIASTOLIC BLOOD PRESSURE: 70 MMHG

## 2024-09-18 DIAGNOSIS — N76.0 ACUTE VAGINITIS: ICD-10-CM

## 2024-09-18 DIAGNOSIS — O99.810 ABNORMAL GLUCOSE COMPLICATING PREGNANCY: ICD-10-CM

## 2024-09-18 DIAGNOSIS — O09.893 SUPERVISION OF OTHER HIGH RISK PREGNANCIES, THIRD TRIMESTER: ICD-10-CM

## 2024-09-18 DIAGNOSIS — N39.0 URINARY TRACT INFECTION, SITE NOT SPECIFIED: ICD-10-CM

## 2024-09-18 DIAGNOSIS — Z3A.35 35 WEEKS GESTATION OF PREGNANCY: ICD-10-CM

## 2024-09-18 DIAGNOSIS — R39.89 OTHER SYMPTOMS AND SIGNS INVOLVING THE GENITOURINARY SYSTEM: ICD-10-CM

## 2024-09-18 DIAGNOSIS — Z34.92 ENCOUNTER FOR SUPERVISION OF NORMAL PREGNANCY, UNSPECIFIED, SECOND TRIMESTER: ICD-10-CM

## 2024-09-18 DIAGNOSIS — Z11.3 ENCOUNTER FOR SCREENING FOR INFECTIONS WITH A PREDOMINANTLY SEXUAL MODE OF TRANSMISSION: ICD-10-CM

## 2024-09-18 DIAGNOSIS — O36.5990 MATERNAL CARE FOR OTHER KNOWN OR SUSPECTED POOR FETAL GROWTH, UNSPECIFIED TRIMESTER, NOT APPLICABLE OR UNSPECIFIED: ICD-10-CM

## 2024-09-18 LAB
BILIRUB UR QL STRIP: NORMAL
CLARITY UR: CLEAR
COLLECTION METHOD: NORMAL
GLUCOSE UR-MCNC: NORMAL
HCG UR QL: 0.2 EU/DL
HGB UR QL STRIP.AUTO: NORMAL
KETONES UR-MCNC: NORMAL
LEUKOCYTE ESTERASE UR QL STRIP: NORMAL
NITRITE UR QL STRIP: NORMAL
PH UR STRIP: 6.5
PROT UR STRIP-MCNC: NORMAL
SP GR UR STRIP: 1.01

## 2024-09-18 PROCEDURE — 81003 URINALYSIS AUTO W/O SCOPE: CPT | Mod: QW

## 2024-09-18 PROCEDURE — 99214 OFFICE O/P EST MOD 30 MIN: CPT | Mod: 25

## 2024-09-18 RX ORDER — NITROFURANTOIN (MONOHYDRATE/MACROCRYSTALS) 25; 75 MG/1; MG/1
100 CAPSULE ORAL TWICE DAILY
Qty: 10 | Refills: 0 | Status: ACTIVE | COMMUNITY
Start: 2024-09-18 | End: 1900-01-01

## 2024-09-18 RX ORDER — METRONIDAZOLE 500 MG/1
500 TABLET ORAL TWICE DAILY
Qty: 14 | Refills: 0 | Status: ACTIVE | COMMUNITY
Start: 2024-09-18 | End: 1900-01-01

## 2024-09-18 RX ORDER — SULFAMETHOXAZOLE AND TRIMETHOPRIM 800; 160 MG/1; MG/1
800-160 TABLET ORAL TWICE DAILY
Qty: 14 | Refills: 0 | Status: DISCONTINUED | COMMUNITY
Start: 2024-09-18 | End: 2024-09-18

## 2024-09-18 NOTE — CHIEF COMPLAINT
[FreeTextEntry1] : Ciara presents today with c/o urinary frequency and urgency as well as an increase in vaginal discharge with a fishy odor.  Urine dip today notes large blood, small leuks.  No menses today

## 2024-09-18 NOTE — PHYSICAL EXAM
[Labia Majora] : labia major [Labia Minora] : labia minora [Normal] : clitoris [Labia Majora Erythema Right] : no erythema of the right labia majora [FreeTextEntry4] : watery slightly brown vaginal discharge

## 2024-09-19 ENCOUNTER — TRANSCRIPTION ENCOUNTER (OUTPATIENT)
Age: 34
End: 2024-09-19

## 2024-09-19 DIAGNOSIS — M32.9 OTHER SPECIFIED DISEASES AND CONDITIONS COMPLICATING PREGNANCY: ICD-10-CM

## 2024-09-19 DIAGNOSIS — O99.891 OTHER SPECIFIED DISEASES AND CONDITIONS COMPLICATING PREGNANCY: ICD-10-CM

## 2024-09-19 LAB
CANDIDA VAG CYTO: NOT DETECTED
G VAGINALIS+PREV SP MTYP VAG QL MICRO: DETECTED
T VAGINALIS VAG QL WET PREP: NOT DETECTED

## 2024-09-20 ENCOUNTER — TRANSCRIPTION ENCOUNTER (OUTPATIENT)
Age: 34
End: 2024-09-20

## 2024-09-23 LAB — BACTERIA UR CULT: ABNORMAL

## 2024-09-25 ENCOUNTER — APPOINTMENT (OUTPATIENT)
Dept: ULTRASOUND IMAGING | Facility: CLINIC | Age: 34
End: 2024-09-25

## 2024-10-02 ENCOUNTER — LABORATORY RESULT (OUTPATIENT)
Age: 34
End: 2024-10-02

## 2024-10-02 DIAGNOSIS — N39.0 URINARY TRACT INFECTION, SITE NOT SPECIFIED: ICD-10-CM

## 2024-10-03 LAB
APPEARANCE: CLEAR
BILIRUBIN URINE: NEGATIVE
BLOOD URINE: ABNORMAL
COLOR: YELLOW
GLUCOSE QUALITATIVE U: NEGATIVE MG/DL
KETONES URINE: NEGATIVE MG/DL
LEUKOCYTE ESTERASE URINE: ABNORMAL
NITRITE URINE: NEGATIVE
PH URINE: 6.5
PROTEIN URINE: NEGATIVE MG/DL
SPECIFIC GRAVITY URINE: 1
UROBILINOGEN URINE: 0.2 MG/DL

## 2024-10-04 ENCOUNTER — APPOINTMENT (OUTPATIENT)
Dept: ULTRASOUND IMAGING | Facility: HOSPITAL | Age: 34
End: 2024-10-04
Payer: MEDICAID

## 2024-10-04 ENCOUNTER — RESULT REVIEW (OUTPATIENT)
Age: 34
End: 2024-10-04

## 2024-10-04 PROCEDURE — 76942 ECHO GUIDE FOR BIOPSY: CPT | Mod: 26

## 2024-10-04 PROCEDURE — 88348 ELECTRON MICROSCOPY DX: CPT | Mod: 26

## 2024-10-04 PROCEDURE — 50200 RENAL BIOPSY PERQ: CPT | Mod: LT

## 2024-10-04 PROCEDURE — 88350 IMFLUOR EA ADDL 1ANTB STN PX: CPT | Mod: 26

## 2024-10-04 PROCEDURE — 88346 IMFLUOR 1ST 1ANTB STAIN PX: CPT | Mod: 26

## 2024-10-04 PROCEDURE — 88305 TISSUE EXAM BY PATHOLOGIST: CPT | Mod: 26

## 2024-10-04 PROCEDURE — 88313 SPECIAL STAINS GROUP 2: CPT | Mod: 26

## 2024-11-18 ENCOUNTER — OFFICE (OUTPATIENT)
Dept: URBAN - METROPOLITAN AREA CLINIC 12 | Facility: CLINIC | Age: 34
Setting detail: OPHTHALMOLOGY
End: 2024-11-18
Payer: COMMERCIAL

## 2024-11-18 DIAGNOSIS — H00.14: ICD-10-CM

## 2024-11-18 PROCEDURE — 92012 INTRM OPH EXAM EST PATIENT: CPT | Performed by: STUDENT IN AN ORGANIZED HEALTH CARE EDUCATION/TRAINING PROGRAM

## 2024-11-18 ASSESSMENT — CONFRONTATIONAL VISUAL FIELD TEST (CVF)
OD_FINDINGS: FULL
OS_FINDINGS: FULL

## 2024-11-18 ASSESSMENT — SUPERFICIAL PUNCTATE KERATITIS (SPK)
OD_SPK: T
OS_SPK: T

## 2024-11-18 ASSESSMENT — TONOMETRY
OD_IOP_MMHG: 21
OS_IOP_MMHG: 19

## 2024-11-20 ASSESSMENT — KERATOMETRY
OD_AXISANGLE_DEGREES: 090
OD_K1POWER_DIOPTERS: 44.00
OD_K2POWER_DIOPTERS: 45.00
METHOD_AUTO_MANUAL: AUTO
OS_K1POWER_DIOPTERS: 44.25
OS_AXISANGLE_DEGREES: 091
OS_K2POWER_DIOPTERS: 44.75

## 2024-11-20 ASSESSMENT — REFRACTION_CURRENTRX
OS_VPRISM_DIRECTION: SV
OS_SPHERE: -4.75
OS_OVR_VA: 20/
OD_SPHERE: -5.25
OS_AXIS: 000
OD_CYLINDER: +0.25
OD_AXIS: 062
OD_VPRISM_DIRECTION: SV
OD_OVR_VA: 20/
OS_CYLINDER: SPHERE

## 2024-11-20 ASSESSMENT — REFRACTION_AUTOREFRACTION
OS_CYLINDER: -0.50
OD_CYLINDER: -0.50
OD_AXIS: 034
OD_SPHERE: -5.00
OS_SPHERE: -4.50
OS_AXIS: 159

## 2024-11-20 ASSESSMENT — REFRACTION_MANIFEST
OS_VA1: 20/20
OS_VA1: 20/20
OD_VA1: 20/20
OS_SPHERE: -4.50
OU_VA: 20/20
OD_CYLINDER: +0.50
OD_AXIS: 170
OD_VA1: 20/20
OS_SPHERE: -4.75
OD_AXIS: 165
OD_CYLINDER: +0.25
OD_SPHERE: -5.25
OD_SPHERE: -5.25
OS_CYLINDER: SPH

## 2024-11-20 ASSESSMENT — VISUAL ACUITY
OS_BCVA: 20/20
OD_BCVA: 20/25

## 2024-11-21 ENCOUNTER — OFFICE (OUTPATIENT)
Dept: URBAN - METROPOLITAN AREA CLINIC 100 | Facility: CLINIC | Age: 34
Setting detail: OPHTHALMOLOGY
End: 2024-11-21
Payer: COMMERCIAL

## 2024-11-21 DIAGNOSIS — H16.223: ICD-10-CM

## 2024-11-21 DIAGNOSIS — H00.14: ICD-10-CM

## 2024-11-21 PROCEDURE — 92012 INTRM OPH EXAM EST PATIENT: CPT | Performed by: OPHTHALMOLOGY

## 2024-11-21 ASSESSMENT — CONFRONTATIONAL VISUAL FIELD TEST (CVF)
OD_FINDINGS: FULL
OS_FINDINGS: FULL

## 2024-11-21 ASSESSMENT — VISUAL ACUITY
OS_BCVA: 20/20
OD_BCVA: 20/25

## 2024-11-21 ASSESSMENT — SUPERFICIAL PUNCTATE KERATITIS (SPK)
OD_SPK: T
OS_SPK: T

## 2024-11-21 ASSESSMENT — KERATOMETRY
OS_AXISANGLE_DEGREES: 091
OD_AXISANGLE_DEGREES: 090
METHOD_AUTO_MANUAL: AUTO
OS_K2POWER_DIOPTERS: 44.75
OD_K2POWER_DIOPTERS: 45.00
OD_K1POWER_DIOPTERS: 44.00
OS_K1POWER_DIOPTERS: 44.25

## 2024-11-21 ASSESSMENT — LID EXAM ASSESSMENTS
OS_MEIBOMITIS: 1+
OD_MEIBOMITIS: 1+

## 2024-11-21 ASSESSMENT — REFRACTION_AUTOREFRACTION
OS_AXIS: 159
OS_SPHERE: -4.50
OD_SPHERE: -5.00
OD_AXIS: 034
OD_CYLINDER: -0.50
OS_CYLINDER: -0.50

## 2024-11-22 NOTE — ED STATDOCS - NS_ATTENDINGSCRIBE_ED_ALL_ED
Patient received to wellness, a&ox4, ambulatory. c/o lower back pain s/p mechanical trip and fall while working; pt [employer]. pt denies hitting head, loc, chest pain, sob, numbness, tingling. RR even, unlabored. Pending KRYS vieira. I personally performed the service described in the documentation recorded by the scribe in my presence, and it accurately and completely records my words and actions.

## 2024-12-30 NOTE — OB RN PATIENT PROFILE - NS_PRENATALHARD_OBGYN_ALL_OB
Available Detail Level: Detailed Patient Specific Counseling (Will Not Stick From Patient To Patient): Plan to treat face

## 2025-02-10 NOTE — ED ADULT TRIAGE NOTE - BP NONINVASIVE DIASTOLIC (MM HG)
Spoke with the patient (POA present in the background) who reports an ongoing cough for the last several weeks that is not improving. Cough is productive of green phlegm. Feels she has \"gurgling\" in her chest at night. Denies any SOB or wheezing. Afebrile, denies chills. Has had intermittent headaches and body aches. C/o rib pain due to cough (recent broken ribs). Mucinex has helped slightly.     Declines in-person evaluation as she does not have a ride. Agreeable to virtual visit with PCP. POA will be helping the patient with this.    86